# Patient Record
Sex: MALE | Race: WHITE | NOT HISPANIC OR LATINO | Employment: OTHER | URBAN - METROPOLITAN AREA
[De-identification: names, ages, dates, MRNs, and addresses within clinical notes are randomized per-mention and may not be internally consistent; named-entity substitution may affect disease eponyms.]

---

## 2018-02-12 NOTE — H&P
[facility]  TOTAL EYE CARE  [address]  [facility] [address]  [phone]  Dept: [phone]    OPHTHALMOLOGY PRE-OPERATIVE  HISTORY AND PHYSICAL    DATE OF H/P:  2018    DATE OF SURGERY:  2018  PROCEDURE:  Procedure(s):  PHACOEMULSIFICATION CLEAR CORNEA WITH TORIC INTRAOCULAR LENS IMPLANT, Right Eye  LENS IMPLANT:  [id number] +17.0  REFRACTIVE GOAL:  PL Sph  SURGEON:  Mike Collier MD    ANESTHESIA:  TOPICAL / MAC    OR CASE REQUIREMENTS:  Symfony Toric IOL with axis at 169 degrees.    DEMOGRAPHICS:  Demographic Information on Dusty Ruggiero:    Dusty Ruggiero  Gender: male  : 1945  [address]  [place] [address]  [phone] (home)     Medical Record: [id number]  Social Security Number: [id number]  Pharmacy: [facility][id number] [address]  Primary Care Provider: Conrad Gonzalez    Parent's names are: Data Unavailable (mother) and Data Unavailable (father).    Insurance: Payor: BCBS / Plan: BC[employer] PLATINUM BLUE / Product Type: PPO /     OCULAR HISTORY:  Cataracts, astigmatism, presbyopia.    HISTORIES:  Past Medical History:   Diagnosis Date     Generalized osteoarthrosis, unspecified site     Arthritis     Other anxiety states      Prostatitis, unspecified     History of Prostatitis     Ulcerative colitis, unspecified     History of Colitis     Unspecified essential hypertension        Past Surgical History:   Procedure Laterality Date     ARTHROSCOPY SHOULDER, OPEN ROTATOR CUFF REPAIR, COMBINED Right 3/4/2016    Procedure: COMBINED ARTHROSCOPY SHOULDER, OPEN ROTATOR CUFF REPAIR;  Surgeon: Wily Kan MD;  Location: [facility] OR     FLEXIBLE SIGMOIDOSCOPY  1999     SURGICAL HISTORY OF -       Colostomy  with ileostomy     SURGICAL HISTORY OF -       Colostomy Reversal     SURGICAL HISTORY OF -       Left Shoulder Rotator Cuff Surgery       Family History   Problem Relation Age of Onset     Breast Cancer Sister         Social History   Substance Use Topics     Smoking status: Former Smoker     Quit date: 2/5/1994     Smokeless tobacco: Not on file     Alcohol use No      Comment: stopped a couple of months       MEDICATIONS:  No current facility-administered medications for this encounter.      Current Outpatient Prescriptions   Medication Sig     Coenzyme Q10 (COQ-10 PO) Take 1 tablet by mouth daily     COLCHICINE PO Take 0.6 mg by mouth daily     oxyCODONE-acetaminophen (PERCOCET) 5-325 MG per tablet Take 1-2 tablets by mouth every 4 hours as needed for pain (moderate to severe)     hydrOXYzine (ATARAX) 10 MG tablet Take 1 tablet (10 mg) by mouth every 6 hours as needed for itching (and nausea)     DULoxetine HCl (CYMBALTA PO) Take 20 mg by mouth daily     meloxicam (MOBIC) 7.5 MG tablet Take 1 tablet (7.5 mg) by mouth daily     oxyCODONE-acetaminophen (PERCOCET) 5-325 MG per tablet 1-2 tabs by mouth every 4-6 hrs. as needed for pain.     SIMVASTATIN PO Take 20 mg by mouth At Bedtime     RAMIPRIL PO Take 10 mg by mouth daily     LORazepam (ATIVAN PO) Take 1 mg by mouth 3 times daily as needed for anxiety     POTASSIUM CITRATE PO Take 10 mEq by mouth daily     ferrous sulfate 325 (65 FE) MG tablet Take 325 mg by mouth daily     Cholecalciferol (VITAMIN D3 PO) Take 5,000 Units by mouth daily     Omega-3 Fatty Acids (OMEGA-3 FISH OIL PO) Take 1 tablet by mouth daily 900 mg/1400 mg     aspirin 81 MG chewable tablet Take 81 mg by mouth daily     lidocaine (LIDODERM) 5 % patch Place 1 patch onto the skin every 24 hours 12 hours on, 12 hours off     HYDROcodone-acetaminophen (NORCO) 5-325 MG per tablet Take 1 tablet by mouth 3 times daily as needed for moderate to severe pain     OMEPRAZOLE 20 MG OR CPDR 1 CAPSULE by mouth daily as needed       ALLERGIES:     Allergies   Allergen Reactions     Atenolol Shortness Of Breath     Atenolol Difficulty breathing     Rapid heart beat     Buspirone Difficulty breathing     Rapid heart  beat     Gabapentin Other (See Comments)     Dizzy       Lyrica [Pregabalin] Other (See Comments)     Dizzy       Benadryl [Diphenhydramine] Anxiety     Vistaril [Hydroxyzine] Anxiety       PERTINENT SYSTEMS REVIEW:    1. No - Do you have a history of heart attack, stroke, stent, bypass or surgery on an artery in the head, neck, heart or legs?  2. No - Do you ever have any pain or discomfort in your chest?  3. No - Do you have a history of  Heart Failure?  4. No - Are you troubled by shortness of breath when walking: On the level, up a slight hill or at night?  5. No - Do you currently have a cold, bronchitis or other respiratory infection?  6. No - Do you have a cough, shortness of breath or wheezing?  7. No - Do you sometimes get pains in the calves of your legs when you walk?  8. No - Do you or anyone in your family have previous history of blood clots?  9. No - Do you or does anyone in your family have a serious bleeding problem such as prolonged bleeding following surgeries or cuts?  10. No - Have you ever had problems with anemia or been told to take iron pills?  11. No - Have you had any abnormal blood loss such as black, tarry or bloody stools, or abnormal vaginal bleeding?  12. No - Have you ever had a blood transfusion?  13. No - Have you or any of your relatives ever had problems with anesthesia?  14. Yes: mild sleep apnea - Do you have sleep apnea, excessive snoring or daytime drowsiness?  15. No - Do you have any prosthetic heart valves?  16. Yes: Right knee replacement - Do you have prosthetic joints?    EXAMINATION:  Vitals were reviewed  Vison:  Va, right - 20/30, left - 20/30;   BAT, right - 20/70, left - 20/70;  HEENT:  Cataract, otherwise unremarkable.  LUNGS:  Clear  CV:  Regular rate and rhythm without murmur  ABD:  Soft and nontender  NEURO:  Alert and nonfocal    IMPRESSION:  Patient cleared for ophthalmic surgery.  Low risk with monitored, light sedation.  I have assessed the patient's DVT  risk, and no additional orders necessary.    PLAN:  Procedure(s):  PHACOEMULSIFICATION CLEAR CORNEA WITH TORIC INTRAOCULAR LENS IMPLANT, Right Eye      Mike Collier MD

## 2018-02-14 RX ORDER — VIT C/HESPERIDIN/BIOFLAVONOIDS 500-100 MG
TABLET ORAL
COMMUNITY

## 2018-02-14 RX ORDER — GLUCOSAMINE/D3/BOSWELLIA SERRA 1500MG-400
TABLET ORAL
COMMUNITY

## 2018-02-16 ENCOUNTER — ANESTHESIA EVENT (OUTPATIENT)
Dept: SURGERY | Facility: CLINIC | Age: 73
End: 2018-02-16
Payer: MEDICARE

## 2018-02-16 ASSESSMENT — LIFESTYLE VARIABLES: TOBACCO_USE: 1

## 2018-02-16 NOTE — ANESTHESIA PREPROCEDURE EVALUATION
Anesthesia Evaluation     . Pt has had prior anesthetic. Type: General and MAC           ROS/MED HX    ENT/Pulmonary:     (+)tobacco use, Past use , . .    Neurologic:  - neg neurologic ROS     Cardiovascular:     (+) hypertension----. : . . . :. .       METS/Exercise Tolerance:  >4 METS   Hematologic:  - neg hematologic  ROS       Musculoskeletal: Comment: DDD lumbar  (+) arthritis, , , -       GI/Hepatic:     (+) Other GI/Hepatic hx ulcerative colitis      Renal/Genitourinary:     (+) BPH,       Endo:  - neg endo ROS       Psychiatric:     (+) psychiatric history anxiety      Infectious Disease:  - neg infectious disease ROS       Malignancy:      - no malignancy   Other:    - neg other ROS                 Physical Exam  Normal systems: cardiovascular, pulmonary and dental    Airway   Mallampati: II  TM distance: >3 FB  Neck ROM: full    Dental     Cardiovascular       Pulmonary                     Anesthesia Plan      History & Physical Review  History and physical reviewed and following examination; no interval change.    ASA Status:  2 .    NPO Status:  > 6 hours    Plan for MAC Reason for MAC:  Deep or markedly invasive procedure (G8)  PONV prophylaxis:  Ondansetron (or other 5HT-3) and Dexamethasone or Solumedrol       Postoperative Care  Postoperative pain management:  IV analgesics, Oral pain medications and Multi-modal analgesia.      Consents  Anesthetic plan, risks, benefits and alternatives discussed with:  Patient..                          .

## 2018-02-19 ENCOUNTER — HOSPITAL ENCOUNTER (OUTPATIENT)
Facility: CLINIC | Age: 73
Discharge: HOME | End: 2018-02-19
Attending: OPHTHALMOLOGY | Admitting: OPHTHALMOLOGY
Payer: MEDICARE

## 2018-02-19 ENCOUNTER — ANESTHESIA (OUTPATIENT)
Dept: SURGERY | Facility: CLINIC | Age: 73
End: 2018-02-19
Payer: MEDICARE

## 2018-02-19 ENCOUNTER — HOSPITAL ENCOUNTER (OUTPATIENT)
Facility: CLINIC | Age: 73
Discharge: HOME | End: 2018-02-19
Attending: OPHTHALMOLOGY | Admitting: OPHTHALMOLOGY

## 2018-02-19 ENCOUNTER — SURGERY (OUTPATIENT)
Age: 73
End: 2018-02-19

## 2018-02-19 VITALS
RESPIRATION RATE: 16 BRPM | OXYGEN SATURATION: 97 % | SYSTOLIC BLOOD PRESSURE: 130 MMHG | TEMPERATURE: 98.6 F | WEIGHT: 185 LBS | BODY MASS INDEX: 25.06 KG/M2 | HEIGHT: 72 IN | DIASTOLIC BLOOD PRESSURE: 82 MMHG

## 2018-02-19 PROCEDURE — 71000022 ZZH RECOVERY CATRACT PACKAGE: Performed by: OPHTHALMOLOGY

## 2018-02-19 PROCEDURE — 25000128 H RX IP 250 OP 636: Performed by: OPHTHALMOLOGY

## 2018-02-19 PROCEDURE — 37000012 ZZH ANESTHESIA CATARACT PACKAGE: Performed by: OPHTHALMOLOGY

## 2018-02-19 PROCEDURE — 25000128 H RX IP 250 OP 636: Performed by: NURSE ANESTHETIST, CERTIFIED REGISTERED

## 2018-02-19 PROCEDURE — V2788 PRESBYOPIA-CORRECT FUNCTION: HCPCS | Performed by: OPHTHALMOLOGY

## 2018-02-19 PROCEDURE — 25000125 ZZHC RX 250: Performed by: NURSE ANESTHETIST, CERTIFIED REGISTERED

## 2018-02-19 PROCEDURE — 36000025 ZZH CATARACT SURGICAL PACKAGE: Performed by: OPHTHALMOLOGY

## 2018-02-19 PROCEDURE — 25000125 ZZHC RX 250: Performed by: OPHTHALMOLOGY

## 2018-02-19 PROCEDURE — V2632 POST CHMBR INTRAOCULAR LENS: HCPCS | Performed by: OPHTHALMOLOGY

## 2018-02-19 DEVICE — IMPLANTABLE DEVICE: Type: IMPLANTABLE DEVICE | Site: EYE | Status: FUNCTIONAL

## 2018-02-19 RX ORDER — PROPARACAINE HYDROCHLORIDE 5 MG/ML
SOLUTION/ DROPS OPHTHALMIC PRN
Status: DISCONTINUED | OUTPATIENT
Start: 2018-02-19 | End: 2018-02-19 | Stop reason: HOSPADM

## 2018-02-19 RX ORDER — SODIUM CHLORIDE, SODIUM LACTATE, POTASSIUM CHLORIDE, CALCIUM CHLORIDE 600; 310; 30; 20 MG/100ML; MG/100ML; MG/100ML; MG/100ML
INJECTION, SOLUTION INTRAVENOUS CONTINUOUS
Status: DISCONTINUED | OUTPATIENT
Start: 2018-02-19 | End: 2018-02-19 | Stop reason: HOSPADM

## 2018-02-19 RX ORDER — LIDOCAINE 40 MG/G
CREAM TOPICAL
Status: DISCONTINUED | OUTPATIENT
Start: 2018-02-19 | End: 2018-02-19 | Stop reason: HOSPADM

## 2018-02-19 RX ORDER — PHENYLEPHRINE HYDROCHLORIDE 25 MG/ML
1 SOLUTION/ DROPS OPHTHALMIC
Status: COMPLETED | OUTPATIENT
Start: 2018-02-19 | End: 2018-02-19

## 2018-02-19 RX ORDER — BALANCED SALT SOLUTION 6.4; .75; .48; .3; 3.9; 1.7 MG/ML; MG/ML; MG/ML; MG/ML; MG/ML; MG/ML
SOLUTION OPHTHALMIC PRN
Status: DISCONTINUED | OUTPATIENT
Start: 2018-02-19 | End: 2018-02-19 | Stop reason: HOSPADM

## 2018-02-19 RX ORDER — TROPICAMIDE 10 MG/ML
1 SOLUTION/ DROPS OPHTHALMIC
Status: COMPLETED | OUTPATIENT
Start: 2018-02-19 | End: 2018-02-19

## 2018-02-19 RX ORDER — CYCLOPENTOLATE HYDROCHLORIDE 10 MG/ML
1 SOLUTION/ DROPS OPHTHALMIC
Status: COMPLETED | OUTPATIENT
Start: 2018-02-19 | End: 2018-02-19

## 2018-02-19 RX ADMIN — PHENYLEPHRINE HYDROCHLORIDE 1 DROP: 25 SOLUTION/ DROPS OPHTHALMIC at 08:19

## 2018-02-19 RX ADMIN — LIDOCAINE HYDROCHLORIDE 0.5 TUBE: 20 JELLY TOPICAL at 09:37

## 2018-02-19 RX ADMIN — CYCLOPENTOLATE HYDROCHLORIDE 1 DROP: 10 SOLUTION/ DROPS OPHTHALMIC at 08:31

## 2018-02-19 RX ADMIN — BALANCED SALT SOLUTION 250 ML: 6.4; .75; .48; .3; 3.9; 1.7 SOLUTION OPHTHALMIC at 09:36

## 2018-02-19 RX ADMIN — PHENYLEPHRINE HYDROCHLORIDE 1 DROP: 25 SOLUTION/ DROPS OPHTHALMIC at 08:31

## 2018-02-19 RX ADMIN — MIDAZOLAM 2 MG: 1 INJECTION INTRAMUSCULAR; INTRAVENOUS at 09:28

## 2018-02-19 RX ADMIN — Medication 1.4 ML GIVEN: at 09:37

## 2018-02-19 RX ADMIN — PHENYLEPHRINE HYDROCHLORIDE 1 DROP: 25 SOLUTION/ DROPS OPHTHALMIC at 08:25

## 2018-02-19 RX ADMIN — EPINEPHRINE 0.6 ML: 1 INJECTION, SOLUTION INTRAMUSCULAR; SUBCUTANEOUS at 09:40

## 2018-02-19 RX ADMIN — MOXIFLOXACIN HYDROCHLORIDE 0.7 ML: 5 SOLUTION/ DROPS OPHTHALMIC at 09:45

## 2018-02-19 RX ADMIN — TROPICAMIDE 1 DROP: 10 SOLUTION/ DROPS OPHTHALMIC at 08:31

## 2018-02-19 RX ADMIN — LIDOCAINE HYDROCHLORIDE 1 ML: 10 INJECTION, SOLUTION EPIDURAL; INFILTRATION; INTRACAUDAL; PERINEURAL at 08:20

## 2018-02-19 RX ADMIN — PROPARACAINE HYDROCHLORIDE 2 DROP: 5 SOLUTION/ DROPS OPHTHALMIC at 09:37

## 2018-02-19 RX ADMIN — CYCLOPENTOLATE HYDROCHLORIDE 1 DROP: 10 SOLUTION/ DROPS OPHTHALMIC at 08:24

## 2018-02-19 RX ADMIN — TROPICAMIDE 1 DROP: 10 SOLUTION/ DROPS OPHTHALMIC at 08:24

## 2018-02-19 RX ADMIN — CYCLOPENTOLATE HYDROCHLORIDE 1 DROP: 10 SOLUTION/ DROPS OPHTHALMIC at 08:19

## 2018-02-19 RX ADMIN — TROPICAMIDE 1 DROP: 10 SOLUTION/ DROPS OPHTHALMIC at 08:18

## 2018-02-19 NOTE — ANESTHESIA POSTPROCEDURE EVALUATION
Patient: Dusty Ruggiero    Procedure(s):  Right Cataract Removal with Implant--Toric Lens - Wound Class: I-Clean    Diagnosis:cataract  Diagnosis Additional Information: No value filed.    Anesthesia Type:  MAC    Note:  Anesthesia Post Evaluation    Patient location during evaluation: Phase 2 and Bedside  Patient participation: Able to fully participate in evaluation  Level of consciousness: awake and alert  Pain management: adequate  Airway patency: patent  Cardiovascular status: acceptable and hemodynamically stable  Respiratory status: acceptable and room air  Hydration status: acceptable  PONV: none     Anesthetic complications: None          Last vitals:  Vitals:    02/19/18 0752 02/19/18 0952   BP: (!) 143/93 132/76   Resp: 16 16   Temp: 37  C (98.6  F)    SpO2: 94% 96%         Electronically Signed By: AURORA Corbin CRNA  February 19, 2018  9:55 AM

## 2018-02-19 NOTE — OP NOTE
OPHTHALMOLOGY OPERATIVE NOTE    PATIENT: Dusty Ruggiero  DATE OF SURGERY: 2/19/2018  PREOPERATIVE DIAGNOSIS:  Senile Nuclear Cataract, Right eye  POSTOPERATIVE DIAGNOSIS:  Senile Nuclear Cataract, Right eye  OPERATIVE PROCEDURE:  Phacoemulsification with placement of advanced toric multifocal intraocular lens  SURGEON:  Mike Collier MD  ANESTHESIA:  Topical / MAC  EBL:  None  SPECIMENS:  None  COMPLICATIONS:  None    PROCEDURE:  The patient was brought to the operating room at [facility].  The eye was anesthetized with topical anesthetic, and a sterile marking pen was used to roxana the limbus at the 0 and 180 axis.  The right eye was prepped and draped in the usual fashion for cataract surgery.  A wire lid speculum was inserted.  A super sharp blade was used to make a paracentesis at the 11 O'clock position.  The super sharp blade was used to make a partial thickness temporal groove, which was 3 mm in length.  0.8 mL of non-preserved epi-Shugarcaine was injected into the anterior chamber.  Viscoelastic was used to inflate the anterior chamber through a cannula.  A 2.5 mm microkeratome was used to make a temporal clear corneal incision in a two-plane fashion.  A cystotome needle and forceps were used to make a capsulorrhexis.  Hydrodissection and hydrodelineation were performed with Balance Salt Solution.  The lens was then phacoemulsified and removed without complications.  The cortical material was removed with bimanual irrigation and aspiration.  The capsular bag was filled with viscoelastic.  A posterior chamber intraocular lens, preselected and recorded, was folded and inserted into the capsular bag.  The lens was then rotated to the correct axis according to the preoperative data, using the limbal markings and an axial gauge.  The viscoelastic was removed with the irrigation and aspiration tip.  Balanced Salt Solution with Vigamox, 150mg/0.1mL, was used to refill the anterior  chamber, and make final lens axis adjustments.  The wounds were checked for water tightness and required no suture.  The wire lid speculum was removed.  The patient's right eye was cleaned and a drop of each post-operative drop was placed, followed by a han shield.  The patient tolerated the procedure well, and there were no complications.      Mike Collier MD

## 2018-02-19 NOTE — ANESTHESIA CARE TRANSFER NOTE
Patient: Dusty Ruggiero    Procedure(s):  Right Cataract Removal with Implant--Toric Lens - Wound Class: I-Clean    Diagnosis: cataract  Diagnosis Additional Information: No value filed.    Anesthesia Type:   MAC     Note:  Airway :Room Air  Patient transferred to:Phase II  Handoff Report: Identifed the Patient, Identified the Reponsible Provider, Reviewed the pertinent medical history, Discussed the surgical course, Reviewed Intra-OP anesthesia mangement and issues during anesthesia, Set expectations for post-procedure period and Allowed opportunity for questions and acknowledgement of understanding      Vitals: (Last set prior to Anesthesia Care Transfer)    CRNA VITALS  2/19/2018 0918 - 2/19/2018 0949      2/19/2018             Pulse: 57    SpO2: 95 %                Electronically Signed By: AURORA Corbin CRNA  February 19, 2018  9:49 AM

## 2018-03-05 ENCOUNTER — ANESTHESIA EVENT (OUTPATIENT)
Dept: SURGERY | Facility: CLINIC | Age: 73
End: 2018-03-05
Payer: MEDICARE

## 2018-03-05 ASSESSMENT — LIFESTYLE VARIABLES: TOBACCO_USE: 1

## 2018-03-05 NOTE — ANESTHESIA PREPROCEDURE EVALUATION
Anesthesia Evaluation     . Pt has had prior anesthetic. Type: General and MAC           ROS/MED HX    ENT/Pulmonary:     (+)tobacco use, Past use , . .    Neurologic:  - neg neurologic ROS     Cardiovascular:     (+) hypertension----. : . . . :. .       METS/Exercise Tolerance:  >4 METS   Hematologic:  - neg hematologic  ROS       Musculoskeletal: Comment: DDD lumbar  (+) arthritis, , , other musculoskeletal- DDD      GI/Hepatic:     (+) Other GI/Hepatic hx ulcerative colitis      Renal/Genitourinary:     (+) BPH,       Endo:  - neg endo ROS       Psychiatric:     (+) psychiatric history anxiety      Infectious Disease:  - neg infectious disease ROS       Malignancy:      - no malignancy   Other: Comment: Left cataract                    Physical Exam  Normal systems: cardiovascular, pulmonary and dental    Airway   Mallampati: II  TM distance: >3 FB  Neck ROM: full    Dental     Cardiovascular       Pulmonary                         Anesthesia Plan      History & Physical Review  History and physical reviewed and following examination; no interval change.    ASA Status:  2 .    NPO Status:  > 6 hours    Plan for MAC Reason for MAC:  Deep or markedly invasive procedure (G8)  PONV prophylaxis:  Ondansetron (or other 5HT-3) and Dexamethasone or Solumedrol       Postoperative Care  Postoperative pain management:  IV analgesics, Oral pain medications and Multi-modal analgesia.      Consents  Anesthetic plan, risks, benefits and alternatives discussed with:  Patient..                          .

## 2018-03-07 NOTE — H&P
[facility]  TOTAL EYE CARE  [address]  [facility] [address]  [phone]  Dept: [phone]    OPHTHALMOLOGY PRE-OPERATIVE  HISTORY AND PHYSICAL    DATE OF H/P:  3/7/2018    DATE OF SURGERY:  2018  PROCEDURE:  Procedure(s):  PHACOEMULSIFICATION CLEAR CORNEA WITH TORIC MULTIFOCAL INTRAOCULAR LENS IMPLANT, Left Eye  LENS IMPLANT:  [id number] +17.0  REFRACTIVE GOAL:  PL Sph  SURGEON:  Mike Collier MD    ANESTHESIA:  TOPICAL / MAC    OR CASE REQUIREMENTS:  Toric Multifocal IOL with axis at 2 degrees.    DEMOGRAPHICS:  Demographic Information on Dusty Ruggiero:    Dusty Ruggiero  Gender: male  : 1945  [address]  [place] [address]  [phone] (home)     Medical Record: [id number]  Social Security Number: [id number]  Pharmacy: [facility] #[id number] [address]  Primary Care Provider: Conrad Gonzalez    Parent's names are: Data Unavailable (mother) and Data Unavailable (father).    Insurance: Payor: [employer] / Plan: [employer] PLATINUM BLUE / Product Type: PPO /     OCULAR HISTORY:  Cataracts, s/p IOL OD, astigmatism, presbyopia.    HISTORIES:  Past Medical History:   Diagnosis Date     Generalized osteoarthrosis, unspecified site     Arthritis     Other anxiety states      Prostatitis, unspecified     History of Prostatitis     Ulcerative colitis, unspecified     History of Colitis     Unspecified essential hypertension        Past Surgical History:   Procedure Laterality Date     ARTHROSCOPY SHOULDER, OPEN ROTATOR CUFF REPAIR, COMBINED Right 3/4/2016    Procedure: COMBINED ARTHROSCOPY SHOULDER, OPEN ROTATOR CUFF REPAIR;  Surgeon: Wily Kan MD;  Location: [facility] OR     FLEXIBLE SIGMOIDOSCOPY  1999     PHACOEMULSIFICATION CLEAR CORNEA WITH TORIC INTRAOCULAR LENS IMPLANT Right 2018    Procedure: PHACOEMULSIFICATION CLEAR CORNEA WITH TORIC INTRAOCULAR LENS IMPLANT;  Right Cataract Removal with Implant--Toric Lens;  Surgeon: Nando  Mike Lee MD;  Location: [facility] OR     SURGICAL HISTORY OF -       Colostomy  with ileostomy     SURGICAL HISTORY OF -   2000    Colostomy Reversal     SURGICAL HISTORY OF -       Left Shoulder Rotator Cuff Surgery       Family History   Problem Relation Age of Onset     Breast Cancer Sister        Social History   Substance Use Topics     Smoking status: Former Smoker     Quit date: 2/5/1994     Smokeless tobacco: Not on file     Alcohol use No      Comment: stopped a couple of months       MEDICATIONS:  No current facility-administered medications for this encounter.      Current Outpatient Prescriptions   Medication Sig     Zinc 30 MG TABS      Biotin 47308 MCG TABS      MORPHINE SULFATE PO Take 15 mg by mouth Twice daily     SERTRALINE HCL PO Take 50 mg by mouth 2 times daily     PRAVASTATIN SODIUM PO Take 40 mg by mouth every evening     Naproxen Sodium (ALEVE PO) Take 220 mg by mouth Takes 3 times daily     Coenzyme Q10 (COQ-10 PO) Take 1 tablet by mouth daily     hydrOXYzine (ATARAX) 10 MG tablet Take 1 tablet (10 mg) by mouth every 6 hours as needed for itching (and nausea)     RAMIPRIL PO Take 10 mg by mouth daily     LORazepam (ATIVAN PO) Take 1 mg by mouth 3 times daily as needed for anxiety     ferrous sulfate 325 (65 FE) MG tablet Take 325 mg by mouth daily     Cholecalciferol (VITAMIN D3 PO) Take 5,000 Units by mouth daily     OMEPRAZOLE 20 MG OR CPDR 1 CAPSULE by mouth daily as needed       ALLERGIES:     Allergies   Allergen Reactions     Atenolol Shortness Of Breath     Atenolol Difficulty breathing     Rapid heart beat     Buspirone Difficulty breathing     Rapid heart beat     Gabapentin Other (See Comments)     Dizzy       Lyrica [Pregabalin] Other (See Comments)     Dizzy       Benadryl [Diphenhydramine] Anxiety     Vistaril [Hydroxyzine] Anxiety       PERTINENT SYSTEMS REVIEW:    1. No - Do you have a history of heart attack, stroke, stent, bypass or surgery on an artery in the head, neck,  heart or legs?  2. No - Do you ever have any pain or discomfort in your chest?  3. No - Do you have a history of  Heart Failure?  4. No - Are you troubled by shortness of breath when walking: On the level, up a slight hill or at night?  5. No - Do you currently have a cold, bronchitis or other respiratory infection?  6. No - Do you have a cough, shortness of breath or wheezing?  7. No - Do you sometimes get pains in the calves of your legs when you walk?  8. No - Do you or anyone in your family have previous history of blood clots?  9. No - Do you or does anyone in your family have a serious bleeding problem such as prolonged bleeding following surgeries or cuts?  10. No - Have you ever had problems with anemia or been told to take iron pills?  11. No - Have you had any abnormal blood loss such as black, tarry or bloody stools, or abnormal vaginal bleeding?  12. No - Have you ever had a blood transfusion?  13. No - Have you or any of your relatives ever had problems with anesthesia?  14. No - Do you have sleep apnea, excessive snoring or daytime drowsiness?  15. No - Do you have any prosthetic heart valves?  16. Yes: knee replacement - Do you have prosthetic joints?    EXAMINATION:  Vitals were reviewed                     Vison:  Va, left - 20/30;   BAT, left - 20/70;  HEENT:  Cataract, otherwise unremarkable.  LUNGS:  Clear  CV:  Regular rate and rhythm without murmur  ABD:  Soft and nontender  NEURO:  Alert and nonfocal    IMPRESSION:  Patient cleared for ophthalmic surgery.  Low risk with monitored, light sedation.  I have assessed the patient's DVT risk, and no additional orders necessary.    PLAN:  Procedure(s):  PHACOEMULSIFICATION CLEAR CORNEA WITH TORIC MULTIFOCAL INTRAOCULAR LENS IMPLANT, Left Eye      Mike Collier MD

## 2018-03-12 ENCOUNTER — HOSPITAL ENCOUNTER (OUTPATIENT)
Facility: CLINIC | Age: 73
Discharge: HOME | End: 2018-03-12
Attending: OPHTHALMOLOGY | Admitting: OPHTHALMOLOGY

## 2018-03-12 ENCOUNTER — SURGERY (OUTPATIENT)
Age: 73
End: 2018-03-12

## 2018-03-12 ENCOUNTER — HOSPITAL ENCOUNTER (OUTPATIENT)
Facility: CLINIC | Age: 73
Discharge: HOME | End: 2018-03-12
Attending: OPHTHALMOLOGY | Admitting: OPHTHALMOLOGY
Payer: MEDICARE

## 2018-03-12 ENCOUNTER — ANESTHESIA (OUTPATIENT)
Dept: SURGERY | Facility: CLINIC | Age: 73
End: 2018-03-12
Payer: MEDICARE

## 2018-03-12 VITALS
BODY MASS INDEX: 25.06 KG/M2 | DIASTOLIC BLOOD PRESSURE: 82 MMHG | SYSTOLIC BLOOD PRESSURE: 126 MMHG | RESPIRATION RATE: 14 BRPM | WEIGHT: 185 LBS | TEMPERATURE: 98.9 F | OXYGEN SATURATION: 95 % | HEIGHT: 72 IN

## 2018-03-12 PROCEDURE — 25000125 ZZHC RX 250: Performed by: NURSE ANESTHETIST, CERTIFIED REGISTERED

## 2018-03-12 PROCEDURE — 37000012 ZZH ANESTHESIA CATARACT PACKAGE: Performed by: OPHTHALMOLOGY

## 2018-03-12 PROCEDURE — 25000125 ZZHC RX 250: Performed by: OPHTHALMOLOGY

## 2018-03-12 PROCEDURE — 71000022 ZZH RECOVERY CATRACT PACKAGE: Performed by: OPHTHALMOLOGY

## 2018-03-12 PROCEDURE — 36000025 ZZH CATARACT SURGICAL PACKAGE: Performed by: OPHTHALMOLOGY

## 2018-03-12 PROCEDURE — 25000128 H RX IP 250 OP 636: Performed by: OPHTHALMOLOGY

## 2018-03-12 PROCEDURE — V2632 POST CHMBR INTRAOCULAR LENS: HCPCS | Performed by: OPHTHALMOLOGY

## 2018-03-12 PROCEDURE — 25000128 H RX IP 250 OP 636: Performed by: NURSE ANESTHETIST, CERTIFIED REGISTERED

## 2018-03-12 PROCEDURE — V2788 PRESBYOPIA-CORRECT FUNCTION: HCPCS | Performed by: OPHTHALMOLOGY

## 2018-03-12 RX ORDER — PHENYLEPHRINE HYDROCHLORIDE 25 MG/ML
1 SOLUTION/ DROPS OPHTHALMIC
Status: COMPLETED | OUTPATIENT
Start: 2018-03-12 | End: 2018-03-12

## 2018-03-12 RX ORDER — MEPERIDINE HYDROCHLORIDE 25 MG/ML
12.5 INJECTION INTRAMUSCULAR; INTRAVENOUS; SUBCUTANEOUS
Status: DISCONTINUED | OUTPATIENT
Start: 2018-03-12 | End: 2018-03-12 | Stop reason: HOSPADM

## 2018-03-12 RX ORDER — FENTANYL CITRATE 50 UG/ML
25-50 INJECTION, SOLUTION INTRAMUSCULAR; INTRAVENOUS
Status: CANCELLED | OUTPATIENT
Start: 2018-03-12

## 2018-03-12 RX ORDER — SODIUM CHLORIDE, SODIUM LACTATE, POTASSIUM CHLORIDE, CALCIUM CHLORIDE 600; 310; 30; 20 MG/100ML; MG/100ML; MG/100ML; MG/100ML
INJECTION, SOLUTION INTRAVENOUS CONTINUOUS
Status: DISCONTINUED | OUTPATIENT
Start: 2018-03-12 | End: 2018-03-12 | Stop reason: HOSPADM

## 2018-03-12 RX ORDER — LIDOCAINE 40 MG/G
CREAM TOPICAL
Status: DISCONTINUED | OUTPATIENT
Start: 2018-03-12 | End: 2018-03-12 | Stop reason: HOSPADM

## 2018-03-12 RX ORDER — CYCLOPENTOLATE HYDROCHLORIDE 10 MG/ML
1 SOLUTION/ DROPS OPHTHALMIC
Status: COMPLETED | OUTPATIENT
Start: 2018-03-12 | End: 2018-03-12

## 2018-03-12 RX ORDER — PROPARACAINE HYDROCHLORIDE 5 MG/ML
SOLUTION/ DROPS OPHTHALMIC PRN
Status: DISCONTINUED | OUTPATIENT
Start: 2018-03-12 | End: 2018-03-12 | Stop reason: HOSPADM

## 2018-03-12 RX ORDER — TROPICAMIDE 10 MG/ML
1 SOLUTION/ DROPS OPHTHALMIC
Status: COMPLETED | OUTPATIENT
Start: 2018-03-12 | End: 2018-03-12

## 2018-03-12 RX ORDER — NALOXONE HYDROCHLORIDE 0.4 MG/ML
.1-.4 INJECTION, SOLUTION INTRAMUSCULAR; INTRAVENOUS; SUBCUTANEOUS
Status: DISCONTINUED | OUTPATIENT
Start: 2018-03-12 | End: 2018-03-12 | Stop reason: HOSPADM

## 2018-03-12 RX ORDER — ALBUTEROL SULFATE 0.83 MG/ML
2.5 SOLUTION RESPIRATORY (INHALATION) EVERY 4 HOURS PRN
Status: CANCELLED | OUTPATIENT
Start: 2018-03-12

## 2018-03-12 RX ORDER — FENTANYL CITRATE 50 UG/ML
25-50 INJECTION, SOLUTION INTRAMUSCULAR; INTRAVENOUS
Status: DISCONTINUED | OUTPATIENT
Start: 2018-03-12 | End: 2018-03-12 | Stop reason: HOSPADM

## 2018-03-12 RX ORDER — ONDANSETRON 2 MG/ML
4 INJECTION INTRAMUSCULAR; INTRAVENOUS EVERY 30 MIN PRN
Status: DISCONTINUED | OUTPATIENT
Start: 2018-03-12 | End: 2018-03-12 | Stop reason: HOSPADM

## 2018-03-12 RX ORDER — ONDANSETRON 4 MG/1
4 TABLET, ORALLY DISINTEGRATING ORAL EVERY 30 MIN PRN
Status: DISCONTINUED | OUTPATIENT
Start: 2018-03-12 | End: 2018-03-12 | Stop reason: HOSPADM

## 2018-03-12 RX ORDER — HYDROMORPHONE HYDROCHLORIDE 1 MG/ML
.3-.5 INJECTION, SOLUTION INTRAMUSCULAR; INTRAVENOUS; SUBCUTANEOUS EVERY 10 MIN PRN
Status: DISCONTINUED | OUTPATIENT
Start: 2018-03-12 | End: 2018-03-12 | Stop reason: HOSPADM

## 2018-03-12 RX ADMIN — TROPICAMIDE 1 DROP: 10 SOLUTION/ DROPS OPHTHALMIC at 06:33

## 2018-03-12 RX ADMIN — PHENYLEPHRINE HYDROCHLORIDE 1 DROP: 25 SOLUTION/ DROPS OPHTHALMIC at 06:32

## 2018-03-12 RX ADMIN — PHENYLEPHRINE HYDROCHLORIDE 1 DROP: 25 SOLUTION/ DROPS OPHTHALMIC at 06:23

## 2018-03-12 RX ADMIN — LIDOCAINE HYDROCHLORIDE 1 ML: 10 INJECTION, SOLUTION EPIDURAL; INFILTRATION; INTRACAUDAL; PERINEURAL at 06:39

## 2018-03-12 RX ADMIN — CYCLOPENTOLATE HYDROCHLORIDE 1 DROP: 10 SOLUTION/ DROPS OPHTHALMIC at 06:32

## 2018-03-12 RX ADMIN — EPINEPHRINE 0.6 ML: 1 INJECTION, SOLUTION INTRAMUSCULAR; SUBCUTANEOUS at 07:36

## 2018-03-12 RX ADMIN — TROPICAMIDE 1 DROP: 10 SOLUTION/ DROPS OPHTHALMIC at 06:18

## 2018-03-12 RX ADMIN — CYCLOPENTOLATE HYDROCHLORIDE 1 DROP: 10 SOLUTION/ DROPS OPHTHALMIC at 06:15

## 2018-03-12 RX ADMIN — CYCLOPENTOLATE HYDROCHLORIDE 1 DROP: 10 SOLUTION/ DROPS OPHTHALMIC at 06:23

## 2018-03-12 RX ADMIN — PROPARACAINE HYDROCHLORIDE 2 DROP: 5 SOLUTION/ DROPS OPHTHALMIC at 07:36

## 2018-03-12 RX ADMIN — MIDAZOLAM 2 MG: 1 INJECTION INTRAMUSCULAR; INTRAVENOUS at 07:25

## 2018-03-12 RX ADMIN — MOXIFLOXACIN HYDROCHLORIDE 0.4 ML: 5 SOLUTION/ DROPS OPHTHALMIC at 07:37

## 2018-03-12 RX ADMIN — TROPICAMIDE 1 DROP: 10 SOLUTION/ DROPS OPHTHALMIC at 06:24

## 2018-03-12 RX ADMIN — PHENYLEPHRINE HYDROCHLORIDE 1 DROP: 25 SOLUTION/ DROPS OPHTHALMIC at 06:17

## 2018-03-12 RX ADMIN — LIDOCAINE HYDROCHLORIDE 1 TUBE: 20 JELLY TOPICAL at 07:37

## 2018-03-12 NOTE — OP NOTE
OPHTHALMOLOGY OPERATIVE NOTE    PATIENT: Dusty Ruggiero  DATE OF SURGERY: 3/12/2018  PREOPERATIVE DIAGNOSIS:  Senile Nuclear Cataract, Left eye  POSTOPERATIVE DIAGNOSIS:  Senile Nuclear Cataract, Left eye  OPERATIVE PROCEDURE:  Phacoemulsification with placement of advanced toric multifocal intraocular lens  SURGEON:  Mike Collier MD  ANESTHESIA:  Topical / MAC  EBL:  None  SPECIMENS:  None  COMPLICATIONS:  None    PROCEDURE:  The patient was brought to the operating room at [facility].  The eye was anesthetized with topical anesthetic, and a sterile marking pen was used to roxana the limbus at the 0 and 180 axis.  The left eye was prepped and draped in the usual fashion for cataract surgery.  A wire lid speculum was inserted.  A super sharp blade was used to make a paracentesis at the 5 O'clock position.  The super sharp blade was used to make a partial thickness temporal groove, which was 3 mm in length.  0.8 mL of non-preserved epi-Shugarcaine was injected into the anterior chamber.  Viscoelastic was used to inflate the anterior chamber through a cannula.  A 2.5 mm microkeratome was used to make a temporal clear corneal incision in a two-plane fashion.  A cystotome needle and forceps were used to make a capsulorrhexis.  Hydrodissection and hydrodelineation were performed with Balance Salt Solution.  The lens was then phacoemulsified and removed without complications.  The cortical material was removed with bimanual irrigation and aspiration.  The capsular bag was filled with viscoelastic.  A posterior chamber intraocular lens, preselected and recorded, was folded and inserted into the capsular bag.  The lens was then rotated to the correct axis according to the preoperative data, using the limbal markings and an axial gauge.  The viscoelastic was removed with the irrigation and aspiration tip.  Balanced Salt Solution with Vigamox, 150mg/0.1mL,was used to refill the anterior  chamber, and make final lens axis adjustments.  The wounds were checked for water tightness and required no suture.  The wire lid speculum was removed.  The patient's left eye was cleaned and a drop of each post-operative drop was placed, followed by a han shield.  The patient tolerated the procedure well, and there were no complications.      Mike Collier MD

## 2018-03-12 NOTE — ANESTHESIA CARE TRANSFER NOTE
Patient: Dusty Ruggiero    Procedure(s):  Left Cataract Removal with Implant--Toric lens - Wound Class: I-Clean    Diagnosis: cataract  Diagnosis Additional Information: No value filed.    Anesthesia Type:   MAC     Note:    Patient transferred to:Phase II  Handoff Report: Identifed the Patient, Identified the Reponsible Provider, Reviewed the pertinent medical history, Discussed the surgical course, Reviewed Intra-OP anesthesia mangement and issues during anesthesia, Set expectations for post-procedure period and Allowed opportunity for questions and acknowledgement of understanding      Vitals: (Last set prior to Anesthesia Care Transfer)    CRNA VITALS  3/12/2018 0715 - 3/12/2018 0746      3/12/2018             Pulse: 65    SpO2: 95 %                Electronically Signed By: Solange Yan CRNA, APRN CRNA  March 12, 2018  7:46 AM

## 2018-03-12 NOTE — ANESTHESIA POSTPROCEDURE EVALUATION
Patient: Dusty Ruggiero    Procedure(s):  Left Cataract Removal with Implant--Toric lens - Wound Class: I-Clean    Diagnosis:cataract  Diagnosis Additional Information: No value filed.    Anesthesia Type:  MAC    Note:  Anesthesia Post Evaluation    Patient location during evaluation: Bedside  Patient participation: Able to fully participate in evaluation  Level of consciousness: awake and alert  Pain management: adequate  Airway patency: patent  Cardiovascular status: acceptable  Respiratory status: acceptable  Hydration status: acceptable  PONV: none     Anesthetic complications: None          Last vitals:  Vitals:    03/12/18 0600   BP: (!) 171/96   Resp: 16   Temp: 37.2  C (98.9  F)   SpO2: 95%         Electronically Signed By: Solange Yan CRNA, APRN CRNA  March 12, 2018  7:46 AM

## 2018-11-14 ENCOUNTER — OFFICE VISIT (OUTPATIENT)
Dept: DERMATOLOGY | Facility: CLINIC | Age: 73
End: 2018-11-14
Payer: COMMERCIAL

## 2018-11-14 VITALS — DIASTOLIC BLOOD PRESSURE: 73 MMHG | HEART RATE: 77 BPM | SYSTOLIC BLOOD PRESSURE: 114 MMHG | OXYGEN SATURATION: 94 %

## 2018-11-14 DIAGNOSIS — L82.1 SK (SEBORRHEIC KERATOSIS): Primary | ICD-10-CM

## 2018-11-14 DIAGNOSIS — D18.00 ANGIOMA: ICD-10-CM

## 2018-11-14 DIAGNOSIS — L81.4 LENTIGO: ICD-10-CM

## 2018-11-14 PROCEDURE — 99203 OFFICE O/P NEW LOW 30 MIN: CPT | Performed by: DERMATOLOGY

## 2018-11-14 NOTE — PROGRESS NOTES
Dusty Ruggiero is a 73 year old year old male patient here today for spot on nose.   .  Patient states this has been present for a while.  Patient reports the following symptoms:  none .  Patient reports the following previous treatments none.  Patient reports the following modifying factors none.  Associated symptoms: none.  Patient has no other skin complaints today.  Remainder of the HPI, Meds, PMH, Allergies, FH, and SH was reviewed in chart.      Past Medical History:   Diagnosis Date     Generalized osteoarthrosis, unspecified site     Arthritis     Other anxiety states      Prostatitis, unspecified     History of Prostatitis     Ulcerative colitis, unspecified     History of Colitis     Unspecified essential hypertension        Past Surgical History:   Procedure Laterality Date     ARTHROSCOPY SHOULDER, OPEN ROTATOR CUFF REPAIR, COMBINED Right 3/4/2016    Procedure: COMBINED ARTHROSCOPY SHOULDER, OPEN ROTATOR CUFF REPAIR;  Surgeon: Wily Kan MD;  Location: [facility]     FLEXIBLE SIGMOIDOSCOPY  9/1999     PHACOEMULSIFICATION CLEAR CORNEA WITH TORIC INTRAOCULAR LENS IMPLANT Right 2/19/2018    Procedure: PHACOEMULSIFICATION CLEAR CORNEA WITH TORIC INTRAOCULAR LENS IMPLANT;  Right Cataract Removal with Implant--Toric Lens;  Surgeon: Mike Collier MD;  Location: [facility]     PHACOEMULSIFICATION CLEAR CORNEA WITH TORIC INTRAOCULAR LENS IMPLANT Left 3/12/2018    Procedure: PHACOEMULSIFICATION CLEAR CORNEA WITH TORIC INTRAOCULAR LENS IMPLANT;  Left Cataract Removal with Implant--Toric lens;  Surgeon: Mike Collier MD;  Location: [facility]     SURGICAL HISTORY OF -       Colostomy  with ileostomy     SURGICAL HISTORY OF -   2000    Colostomy Reversal     SURGICAL HISTORY OF -       Left Shoulder Rotator Cuff Surgery        Family History   Problem Relation Age of Onset     Skin Cancer Father      Breast Cancer Sister      Melanoma No family hx of        Social History     Social History      Marital status:      Spouse name: N/A     Number of children: N/A     Years of education: N/A     Occupational History     Not on file.     Social History Main Topics     Smoking status: Former Smoker     Packs/day: 0.25     Years: 20.00     Types: Cigarettes     Quit date: 2/5/1995     Smokeless tobacco: Never Used     Alcohol use No      Comment: stopped a couple of months     Drug use: No     Sexual activity: Not on file     Other Topics Concern     Parent/Sibling W/ Cabg, Mi Or Angioplasty Before 65f 55m? No     Social History Narrative       Outpatient Encounter Prescriptions as of 11/14/2018   Medication Sig Dispense Refill     Biotin 69448 MCG TABS        Cholecalciferol (VITAMIN D3 PO) Take 5,000 Units by mouth daily       Coenzyme Q10 (COQ-10 PO) Take 1 tablet by mouth daily       ferrous sulfate 325 (65 FE) MG tablet Take 325 mg by mouth daily       MORPHINE SULFATE PO Take 15 mg by mouth Twice daily       OMEPRAZOLE 20 MG OR CPDR 1 CAPSULE by mouth daily as needed       PRAVASTATIN SODIUM PO Take 40 mg by mouth every evening       RAMIPRIL PO Take 10 mg by mouth daily       SERTRALINE HCL PO Take 50 mg by mouth 2 times daily       Zinc 30 MG TABS        LORazepam (ATIVAN PO) Take 1 mg by mouth 3 times daily as needed for anxiety       Naproxen Sodium (ALEVE PO) Take 220 mg by mouth Takes 3 times daily       No facility-administered encounter medications on file as of 11/14/2018.              Review Of Systems  Skin: As above  Eyes: negative  Ears/Nose/Throat: negative  Respiratory: No shortness of breath, dyspnea on exertion, cough, or hemoptysis  Cardiovascular: negative  Gastrointestinal: negative  Genitourinary: negative  Musculoskeletal: negative  Neurologic: negative  Psychiatric: negative  Hematologic/Lymphatic/Immunologic: negative  Endocrine: negative      O:   NAD, WDWN, Alert & Oriented, Mood & Affect wnl, Vitals stable   Here today alone   /73  Pulse 77  SpO2 94%   General  appearance normal   Vitals stable   Alert, oriented and in no acute distress        Stuck on papules and brown macules on trunk and ext   Red papules on trunk     The remainder of expanded problem focused exam was unremarkable; the following areas were examined:  scalp/hair, conjunctiva/lids, face, neck, lips, chest, digits/nails, RUE, LUE.      Eyes: Conjunctivae/lids:Normal     ENT: Lips, buccal mucosa, tongue: normal    MSK:Normal    Cardiovascular: peripheral edema none    Pulm: Breathing Normal    Lymph Nodes: No Head and Neck Lymphadenopathy     Neuro/Psych: Orientation:Normal; Mood/Affect:Normal      A/P:  1. Seborrheic keratosis, lentigo, angioma,   BENIGN LESIONS DISCUSSED WITH PATIENT:  I discussed the specifics of tumor, prognosis, and genetics of benign lesions.  I explained that treatment of these lesions would be purely cosmetic and not medically neccessary.  I discussed with patient different removal options including excision, cautery and /or laser.      Nature and genetics of benign skin lesions dicussed with patient.  Signs and Symptoms of skin cancer discussed with patient.  ABCDEs of melanoma reviewed with patient.  Patient encouraged to perform monthly skin exams.  UV precautions reviewed with patient.  Patient to follow up with Primary Care provider regarding elevated blood pressure.  Skin care regimen reviewed with patient: Eliminate harsh soaps, i.e. Dial, zest, irsih spring; Mild soaps such as Cetaphil or Dove sensitive skin, avoid hot or cold showers, aggressive use of emollients including vanicream, cetaphil or cerave discussed with patient.    Risks of non-melanoma skin cancer discussed with patient   Return to clinic 12 months  Patient to follow up with Primary Care provider regarding elevated blood pressure.

## 2018-11-14 NOTE — LETTER
11/14/2018         RE: Dusty Ruggiero  [address]  [place] [address]        Dear Colleague,    Thank you for referring your patient, Dusty Ruggiero, to the [facility]. Please see a copy of my visit note below.    Dusty Ruggiero is a 73 year old year old male patient here today for spot on nose.   .  Patient states this has been present for a while.  Patient reports the following symptoms:  none .  Patient reports the following previous treatments none.  Patient reports the following modifying factors none.  Associated symptoms: none.  Patient has no other skin complaints today.  Remainder of the HPI, Meds, PMH, Allergies, FH, and SH was reviewed in chart.      Past Medical History:   Diagnosis Date     Generalized osteoarthrosis, unspecified site     Arthritis     Other anxiety states      Prostatitis, unspecified     History of Prostatitis     Ulcerative colitis, unspecified     History of Colitis     Unspecified essential hypertension        Past Surgical History:   Procedure Laterality Date     ARTHROSCOPY SHOULDER, OPEN ROTATOR CUFF REPAIR, COMBINED Right 3/4/2016    Procedure: COMBINED ARTHROSCOPY SHOULDER, OPEN ROTATOR CUFF REPAIR;  Surgeon: Wily Kan MD;  Location: [facility]     FLEXIBLE SIGMOIDOSCOPY  9/1999     PHACOEMULSIFICATION CLEAR CORNEA WITH TORIC INTRAOCULAR LENS IMPLANT Right 2/19/2018    Procedure: PHACOEMULSIFICATION CLEAR CORNEA WITH TORIC INTRAOCULAR LENS IMPLANT;  Right Cataract Removal with Implant--Toric Lens;  Surgeon: Mike Collier MD;  [facility]     PHACOEMULSIFICATION CLEAR CORNEA WITH TORIC INTRAOCULAR LENS IMPLANT Left 3/12/2018    Procedure: PHACOEMULSIFICATION CLEAR CORNEA WITH TORIC INTRAOCULAR LENS IMPLANT;  Left Cataract Removal with Implant--Toric lens;  Surgeon: Mike Collier MD;  [facility]     SURGICAL HISTORY OF -       Colostomy  with ileostomy     SURGICAL HISTORY OF -   2000    Colostomy Reversal     SURGICAL  HISTORY OF -       Left Shoulder Rotator Cuff Surgery        Family History   Problem Relation Age of Onset     Skin Cancer Father      Breast Cancer Sister      Melanoma No family hx of        Social History     Social History     Marital status:      Spouse name: N/A     Number of children: N/A     Years of education: N/A     Occupational History     Not on file.     Social History Main Topics     Smoking status: Former Smoker     Packs/day: 0.25     Years: 20.00     Types: Cigarettes     Quit date: 2/5/1995     Smokeless tobacco: Never Used     Alcohol use No      Comment: stopped a couple of months     Drug use: No     Sexual activity: Not on file     Other Topics Concern     Parent/Sibling W/ Cabg, Mi Or Angioplasty Before 65f 55m? No     Social History Narrative       Outpatient Encounter Prescriptions as of 11/14/2018   Medication Sig Dispense Refill     Biotin 57265 MCG TABS        Cholecalciferol (VITAMIN D3 PO) Take 5,000 Units by mouth daily       Coenzyme Q10 (COQ-10 PO) Take 1 tablet by mouth daily       ferrous sulfate 325 (65 FE) MG tablet Take 325 mg by mouth daily       MORPHINE SULFATE PO Take 15 mg by mouth Twice daily       OMEPRAZOLE 20 MG OR CPDR 1 CAPSULE by mouth daily as needed       PRAVASTATIN SODIUM PO Take 40 mg by mouth every evening       RAMIPRIL PO Take 10 mg by mouth daily       SERTRALINE HCL PO Take 50 mg by mouth 2 times daily       Zinc 30 MG TABS        LORazepam (ATIVAN PO) Take 1 mg by mouth 3 times daily as needed for anxiety       Naproxen Sodium (ALEVE PO) Take 220 mg by mouth Takes 3 times daily       No facility-administered encounter medications on file as of 11/14/2018.              Review Of Systems  Skin: As above  Eyes: negative  Ears/Nose/Throat: negative  Respiratory: No shortness of breath, dyspnea on exertion, cough, or hemoptysis  Cardiovascular: negative  Gastrointestinal: negative  Genitourinary: negative  Musculoskeletal: negative  Neurologic:  negative  Psychiatric: negative  Hematologic/Lymphatic/Immunologic: negative  Endocrine: negative      O:   NAD, WDWN, Alert & Oriented, Mood & Affect wnl, Vitals stable   Here today alone   /73  Pulse 77  SpO2 94%   General appearance normal   Vitals stable   Alert, oriented and in no acute distress        Stuck on papules and brown macules on trunk and ext   Red papules on trunk     The remainder of expanded problem focused exam was unremarkable; the following areas were examined:  scalp/hair, conjunctiva/lids, face, neck, lips, chest, digits/nails, RUE, LUE.      Eyes: Conjunctivae/lids:Normal     ENT: Lips, buccal mucosa, tongue: normal    MSK:Normal    Cardiovascular: peripheral edema none    Pulm: Breathing Normal    Lymph Nodes: No Head and Neck Lymphadenopathy     Neuro/Psych: Orientation:Normal; Mood/Affect:Normal      A/P:  1. Seborrheic keratosis, lentigo, angioma,   BENIGN LESIONS DISCUSSED WITH PATIENT:  I discussed the specifics of tumor, prognosis, and genetics of benign lesions.  I explained that treatment of these lesions would be purely cosmetic and not medically neccessary.  I discussed with patient different removal options including excision, cautery and /or laser.      Nature and genetics of benign skin lesions dicussed with patient.  Signs and Symptoms of skin cancer discussed with patient.  ABCDEs of melanoma reviewed with patient.  Patient encouraged to perform monthly skin exams.  UV precautions reviewed with patient.  Patient to follow up with Primary Care provider regarding elevated blood pressure.  Skin care regimen reviewed with patient: Eliminate harsh soaps, i.e. Dial, zest, irsih spring; Mild soaps such as Cetaphil or Dove sensitive skin, avoid hot or cold showers, aggressive use of emollients including vanicream, cetaphil or cerave discussed with patient.    Risks of non-melanoma skin cancer discussed with patient   Return to clinic 12 months  Patient to follow up with  Primary Care provider regarding elevated blood pressure.        Again, thank you for allowing me to participate in the care of your patient.        Sincerely,        Mike Dias MD

## 2018-11-14 NOTE — MR AVS SNAPSHOT
"              After Visit Summary   2018    Dusty Ruggiero    MRN: [id number]           Patient Information     Date Of Birth          1945        Visit Information        Provider Department      2018 11:15 AM Mike Dias MD [facility]        Today's Diagnoses     SK (seborrheic keratosis)    -  1    Lentigo        Angioma           Follow-ups after your visit        Who to contact     If you have questions or need follow up information about today's clinic visit or your schedule please contact [facility] directly at [phone].  Normal or non-critical lab and imaging results will be communicated to you by [employer], letter or phone within 4 business days after the clinic has received the results. If you do not hear from us within 7 days, please contact the clinic through [employer] or phone. If you have a critical or abnormal lab result, we will notify you by phone as soon as possible.  Submit refill requests through [employer] or call your pharmacy and they will forward the refill request to us. Please allow 3 business days for your refill to be completed.          Additional Information About Your Visit        MyChart Information     [employer] lets you send messages to your doctor, view your test results, renew your prescriptions, schedule appointments and more. To sign up, go to [email or web address] . Click on \"Log in\" on the left side of the screen, which will take you to the Welcome page. Then click on \"Sign up Now\" on the right side of the page.     You will be asked to enter the access code listed below, as well as some personal information. Please follow the directions to create your username and password.     Your access code is: [email or web address]  Expires: 2019 11:56 AM     Your access code will  in 90 days. If you need help or a new code, please call your [facility] or [phone].        Care EveryWhere ID     This is your Care EveryWhere " ID. This could be used by other organizations to access your [facility] medical records  [id number]        Your Vitals Were     Pulse Pulse Oximetry                77 94%           Blood Pressure from Last 3 Encounters:   11/14/18 114/73   03/12/18 126/82   02/19/18 130/82    Weight from Last 3 Encounters:   03/12/18 83.9 kg (185 lb)   02/19/18 83.9 kg (185 lb)   05/03/16 67.4 kg (148 lb 8 oz)              Today, you had the following     No orders found for display       Primary Care Provider Office Phone # Fax #    Conrad Gonzalez [phone]       [facility] [address]  [facility] [address]        Equal Access to Services     BALTAZAR DUNLAP : Hadii aad ku hadasho Soomaali, waaxda luqadaha, qaybta kaalmada adeegyada, waxay laquita haynazian glen yee . So [facility] [phone].    ATENCIÓN: Si habla español, tiene a olvera disposición servicios gratuitos de asistencia lingüística. [facility] [phone].    We comply with applicable federal civil rights laws and [place] laws. We do not discriminate on the basis of race, color, national origin, age, disability, sex, sexual orientation, or gender identity.            Thank you!     Thank you for choosing [facility]  for your care. Our goal is always to provide you with excellent care. Hearing back from our patients is one way we can continue to improve our services. Please take a few minutes to complete the written survey that you may receive in the mail after your visit with us. Thank you!             Your Updated Medication List - Protect others around you: Learn how to safely use, store and throw away your medicines at www.disposemymeds.org.          This list is accurate as of 11/14/18 11:56 AM.  Always use your most recent med list.                   Brand Name Dispense Instructions for use Diagnosis    ALEVE PO      Take 220 mg by mouth Takes 3 times daily        ATIVAN PO      Take 1 mg by mouth 3 times daily  as needed for anxiety        Biotin 95491 MCG Tabs           COQ-10 PO      Take 1 tablet by mouth daily        ferrous sulfate 325 (65 Fe) MG tablet    IRON     Take 325 mg by mouth daily        MORPHINE SULFATE PO      Take 15 mg by mouth Twice daily        omeprazole 20 MG CR capsule    priLOSEC     1 CAPSULE by mouth daily as needed        PRAVASTATIN SODIUM PO      Take 40 mg by mouth every evening        RAMIPRIL PO      Take 10 mg by mouth daily        SERTRALINE HCL PO      Take 50 mg by mouth 2 times daily        VITAMIN D3 PO      Take 5,000 Units by mouth daily        Zinc 30 MG Tabs

## 2018-11-14 NOTE — NURSING NOTE
"Initial /73  Pulse 77  SpO2 94% Estimated body mass index is 25.44 kg/(m^2) as calculated from the following:    Height as of 3/12/18: 1.816 m (5' 11.5\").    Weight as of 3/12/18: 83.9 kg (185 lb). .    Angi Boyce LPN    "

## 2020-04-20 ENCOUNTER — OFFICE VISIT (OUTPATIENT)
Dept: DERMATOLOGY | Facility: CLINIC | Age: 75
End: 2020-04-20
Payer: COMMERCIAL

## 2020-04-20 VITALS — HEART RATE: 82 BPM | SYSTOLIC BLOOD PRESSURE: 150 MMHG | DIASTOLIC BLOOD PRESSURE: 80 MMHG | OXYGEN SATURATION: 96 %

## 2020-04-20 DIAGNOSIS — L71.0 PERIORAL DERMATITIS: Primary | ICD-10-CM

## 2020-04-20 PROCEDURE — 99214 OFFICE O/P EST MOD 30 MIN: CPT | Performed by: DERMATOLOGY

## 2020-04-20 RX ORDER — PIMECROLIMUS 10 MG/G
CREAM TOPICAL 2 TIMES DAILY
Qty: 60 G | Refills: 3 | Status: SHIPPED | OUTPATIENT
Start: 2020-04-20 | End: 2020-12-29

## 2020-04-20 RX ORDER — DOXYCYCLINE 100 MG/1
100 CAPSULE ORAL 2 TIMES DAILY
Qty: 60 CAPSULE | Refills: 3 | Status: SHIPPED | OUTPATIENT
Start: 2020-04-20 | End: 2020-12-15

## 2020-04-20 NOTE — LETTER
4/20/2020         RE: Dusty Ruggiero  [address]  [place] [address]        Dear Colleague,    Thank you for referring your patient, Dusty Ruggiero, to the [facility]. Please see a copy of my visit note below.    Dusty Ruggiero is a 74 year old year old male patient here today for rash on corners of mouth.   .  Patient states this has been present for weeks.  Patient reports the following symptoms:  Itching and pimples..  .  Patient reports the following previous treatments none.  These treatments did not work.  Patient reports the following modifying factors none.  Associated symptoms: none.  Patient has no other skin complaints today.  Remainder of the HPI, Meds, PMH, Allergies, FH, and SH was reviewed in chart.      Past Medical History:   Diagnosis Date     Generalized osteoarthrosis, unspecified site     Arthritis     Other anxiety states      Prostatitis, unspecified     History of Prostatitis     Ulcerative colitis, unspecified     History of Colitis     Unspecified essential hypertension        Past Surgical History:   Procedure Laterality Date     ARTHROSCOPY SHOULDER, OPEN ROTATOR CUFF REPAIR, COMBINED Right 3/4/2016    Procedure: COMBINED ARTHROSCOPY SHOULDER, OPEN ROTATOR CUFF REPAIR;  Surgeon: Wily Kan MD;  Location: [facility]     FLEXIBLE SIGMOIDOSCOPY  9/1999     PHACOEMULSIFICATION CLEAR CORNEA WITH TORIC INTRAOCULAR LENS IMPLANT Right 2/19/2018    Procedure: PHACOEMULSIFICATION CLEAR CORNEA WITH TORIC INTRAOCULAR LENS IMPLANT;  Right Cataract Removal with Implant--Toric Lens;  Surgeon: Mike Collier MD;  Location: [facility]     PHACOEMULSIFICATION CLEAR CORNEA WITH TORIC INTRAOCULAR LENS IMPLANT Left 3/12/2018    Procedure: PHACOEMULSIFICATION CLEAR CORNEA WITH TORIC INTRAOCULAR LENS IMPLANT;  Left Cataract Removal with Implant--Toric lens;  Surgeon: Mike Collier MD;  Location: [facility]     SURGICAL HISTORY OF -       Colostomy  with ileostomy      SURGICAL HISTORY OF -       Colostomy Reversal     SURGICAL HISTORY OF -       Left Shoulder Rotator Cuff Surgery        Family History   Problem Relation Age of Onset     Skin Cancer Father      Breast Cancer Sister      Melanoma No family hx of        Social History     Socioeconomic History     Marital status:      Spouse name: Not on file     Number of children: Not on file     Years of education: Not on file     Highest education level: Not on file   Occupational History     Not on file   Social Needs     Financial resource strain: Not on file     Food insecurity     Worry: Not on file     Inability: Not on file     Transportation needs     Medical: Not on file     Non-medical: Not on file   Tobacco Use     Smoking status: Former Smoker     Packs/day: 0.25     Years: 20.00     Pack years: 5.00     Types: Cigarettes     Last attempt to quit: 1995     Years since quittin.2     Smokeless tobacco: Never Used   Substance and Sexual Activity     Alcohol use: No     Comment: stopped a couple of months     Drug use: No     Sexual activity: Not on file   Lifestyle     Physical activity     Days per week: Not on file     Minutes per session: Not on file     Stress: Not on file   Relationships     Social connections     Talks on phone: Not on file     Gets together: Not on file     Attends Jewish service: Not on file     Active member of club or organization: Not on file     Attends meetings of clubs or organizations: Not on file     Relationship status: Not on file     Intimate partner violence     Fear of current or ex partner: Not on file     Emotionally abused: Not on file     Physically abused: Not on file     Forced sexual activity: Not on file   Other Topics Concern     Parent/sibling w/ CABG, MI or angioplasty before 65F 55M? No   Social History Narrative     Not on file       Outpatient Encounter Medications as of 2020   Medication Sig Dispense Refill     Biotin 86510 MCG TABS         Cholecalciferol (VITAMIN D3 PO) Take 5,000 Units by mouth daily       Coenzyme Q10 (COQ-10 PO) Take 1 tablet by mouth daily       ferrous sulfate 325 (65 FE) MG tablet Take 325 mg by mouth daily       LORazepam (ATIVAN PO) Take 1 mg by mouth 3 times daily as needed for anxiety       MORPHINE SULFATE PO Take 15 mg by mouth Twice daily       Naproxen Sodium (ALEVE PO) Take 220 mg by mouth Takes 3 times daily       OMEPRAZOLE 20 MG OR CPDR 1 CAPSULE by mouth daily as needed       PRAVASTATIN SODIUM PO Take 40 mg by mouth every evening       RAMIPRIL PO Take 10 mg by mouth daily       SERTRALINE HCL PO Take 50 mg by mouth 2 times daily       Zinc 30 MG TABS        No facility-administered encounter medications on file as of 4/20/2020.              Review Of Systems  Skin: As above  Eyes: negative  Ears/Nose/Throat: negative  Respiratory: No shortness of breath, dyspnea on exertion, cough, or hemoptysis  Cardiovascular: negative  Gastrointestinal: negative  Genitourinary: negative  Musculoskeletal: negative  Neurologic: negative  Psychiatric: negative  Hematologic/Lymphatic/Immunologic: negative  Endocrine: negative      O:   NAD, WDWN, Alert & Oriented, Mood & Affect wnl, Vitals stable   Here today alone   BP (!) 150/80   Pulse 82   SpO2 96%    General appearance normal   Vitals stable   Alert, oriented and in no acute distress     Perioral inflammatory scaly papule      The remainder of expanded problem focused exam was normal; the following areas were examined:  scalp/hair, conjunctiva/lids, face, neck, lips, chest, digits/nails, RUE, LUE.      Eyes: Conjunctivae/lids:Normal     ENT: Lips, buccal mucosa, tongue: normal    MSK:Normal    Cardiovascular: peripheral edema none    Pulm: Breathing Normal    Neuro/Psych: Orientation:Alert and Orientedx3 ; Mood/Affect:normal       A/P:  1. Perioral derm  Doxy twice daily  elidel twice daily  Pathophysiology discussed with pateint   Return to clinic 6 weeks  Skin care  regimen reviewed with patient: Eliminate harsh soaps, i.e. Dial, zest, irsih spring; Mild soaps such as Cetaphil or Dove sensitive skin, avoid hot or cold showers, aggressive use of emollients including vanicream, cetaphil or cerave discussed with patient.        Again, thank you for allowing me to participate in the care of your patient.        Sincerely,        Mike Dias MD

## 2020-04-20 NOTE — PROGRESS NOTES
Dusty Ruggiero is a 74 year old year old male patient here today for rash on corners of mouth.   .  Patient states this has been present for weeks.  Patient reports the following symptoms:  Itching and pimples..  .  Patient reports the following previous treatments none.  These treatments did not work.  Patient reports the following modifying factors none.  Associated symptoms: none.  Patient has no other skin complaints today.  Remainder of the HPI, Meds, PMH, Allergies, FH, and SH was reviewed in chart.      Past Medical History:   Diagnosis Date     Generalized osteoarthrosis, unspecified site     Arthritis     Other anxiety states      Prostatitis, unspecified     History of Prostatitis     Ulcerative colitis, unspecified     History of Colitis     Unspecified essential hypertension        Past Surgical History:   Procedure Laterality Date     ARTHROSCOPY SHOULDER, OPEN ROTATOR CUFF REPAIR, COMBINED Right 3/4/2016    Procedure: COMBINED ARTHROSCOPY SHOULDER, OPEN ROTATOR CUFF REPAIR;  Surgeon: Wily Kan MD;  Location: [facility]     FLEXIBLE SIGMOIDOSCOPY  9/1999     PHACOEMULSIFICATION CLEAR CORNEA WITH TORIC INTRAOCULAR LENS IMPLANT Right 2/19/2018    Procedure: PHACOEMULSIFICATION CLEAR CORNEA WITH TORIC INTRAOCULAR LENS IMPLANT;  Right Cataract Removal with Implant--Toric Lens;  Surgeon: Mike Collier MD;  Location: [facility]     PHACOEMULSIFICATION CLEAR CORNEA WITH TORIC INTRAOCULAR LENS IMPLANT Left 3/12/2018    Procedure: PHACOEMULSIFICATION CLEAR CORNEA WITH TORIC INTRAOCULAR LENS IMPLANT;  Left Cataract Removal with Implant--Toric lens;  Surgeon: Mike Collier MD;  Location: [facility]     SURGICAL HISTORY OF -       Colostomy  with ileostomy     SURGICAL HISTORY OF -   2000    Colostomy Reversal     SURGICAL HISTORY OF -       Left Shoulder Rotator Cuff Surgery        Family History   Problem Relation Age of Onset     Skin Cancer Father      Breast Cancer Sister      Melanoma No  family hx of        Social History     Socioeconomic History     Marital status:      Spouse name: Not on file     Number of children: Not on file     Years of education: Not on file     Highest education level: Not on file   Occupational History     Not on file   Social Needs     Financial resource strain: Not on file     Food insecurity     Worry: Not on file     Inability: Not on file     Transportation needs     Medical: Not on file     Non-medical: Not on file   Tobacco Use     Smoking status: Former Smoker     Packs/day: 0.25     Years: 20.00     Pack years: 5.00     Types: Cigarettes     Last attempt to quit: 1995     Years since quittin.2     Smokeless tobacco: Never Used   Substance and Sexual Activity     Alcohol use: No     Comment: stopped a couple of months     Drug use: No     Sexual activity: Not on file   Lifestyle     Physical activity     Days per week: Not on file     Minutes per session: Not on file     Stress: Not on file   Relationships     Social connections     Talks on phone: Not on file     Gets together: Not on file     Attends [employer] service: Not on file     Active member of club or organization: Not on file     Attends meetings of clubs or organizations: Not on file     Relationship status: Not on file     Intimate partner violence     Fear of current or ex partner: Not on file     Emotionally abused: Not on file     Physically abused: Not on file     Forced sexual activity: Not on file   Other Topics Concern     Parent/sibling w/ CABG, MI or angioplasty before 65F 55M? No   Social History Narrative     Not on file       Outpatient Encounter Medications as of 2020   Medication Sig Dispense Refill     Biotin 63031 MCG TABS        Cholecalciferol (VITAMIN D3 PO) Take 5,000 Units by mouth daily       Coenzyme Q10 (COQ-10 PO) Take 1 tablet by mouth daily       ferrous sulfate 325 (65 FE) MG tablet Take 325 mg by mouth daily       LORazepam (ATIVAN PO) Take 1 mg by  mouth 3 times daily as needed for anxiety       MORPHINE SULFATE PO Take 15 mg by mouth Twice daily       Naproxen Sodium (ALEVE PO) Take 220 mg by mouth Takes 3 times daily       OMEPRAZOLE 20 MG OR CPDR 1 CAPSULE by mouth daily as needed       PRAVASTATIN SODIUM PO Take 40 mg by mouth every evening       RAMIPRIL PO Take 10 mg by mouth daily       SERTRALINE HCL PO Take 50 mg by mouth 2 times daily       Zinc 30 MG TABS        No facility-administered encounter medications on file as of 4/20/2020.              Review Of Systems  Skin: As above  Eyes: negative  Ears/Nose/Throat: negative  Respiratory: No shortness of breath, dyspnea on exertion, cough, or hemoptysis  Cardiovascular: negative  Gastrointestinal: negative  Genitourinary: negative  Musculoskeletal: negative  Neurologic: negative  Psychiatric: negative  Hematologic/Lymphatic/Immunologic: negative  Endocrine: negative      O:   NAD, WDWN, Alert & Oriented, Mood & Affect wnl, Vitals stable   Here today alone   BP (!) 150/80   Pulse 82   SpO2 96%    General appearance normal   Vitals stable   Alert, oriented and in no acute distress     Perioral inflammatory scaly papule      The remainder of expanded problem focused exam was normal; the following areas were examined:  scalp/hair, conjunctiva/lids, face, neck, lips, chest, digits/nails, RUE, LUE.      Eyes: Conjunctivae/lids:Normal     ENT: Lips, buccal mucosa, tongue: normal    MSK:Normal    Cardiovascular: peripheral edema none    Pulm: Breathing Normal    Neuro/Psych: Orientation:Alert and Orientedx3 ; Mood/Affect:normal       A/P:  1. Perioral derm  Doxy twice daily  elidel twice daily  Pathophysiology discussed with pateint   Return to clinic 6 weeks  Skin care regimen reviewed with patient: Eliminate harsh soaps, i.e. Dial, zest, irsih spring; Mild soaps such as Cetaphil or Dove sensitive skin, avoid hot or cold showers, aggressive use of emollients including vanicream, cetaphil or cerave discussed  with patient.

## 2020-08-18 ENCOUNTER — HOSPITAL ENCOUNTER (EMERGENCY)
Facility: CLINIC | Age: 75
Discharge: HOME | End: 2020-08-18
Attending: NURSE PRACTITIONER | Admitting: NURSE PRACTITIONER
Payer: COMMERCIAL

## 2020-08-18 VITALS
DIASTOLIC BLOOD PRESSURE: 81 MMHG | TEMPERATURE: 99.7 F | BODY MASS INDEX: 23.52 KG/M2 | OXYGEN SATURATION: 98 % | WEIGHT: 171 LBS | SYSTOLIC BLOOD PRESSURE: 155 MMHG | RESPIRATION RATE: 20 BRPM

## 2020-08-18 DIAGNOSIS — F41.9 ANXIETY: ICD-10-CM

## 2020-08-18 PROBLEM — F32.5 MAJOR DEPRESSIVE DISORDER WITH SINGLE EPISODE, IN FULL REMISSION (H): Status: ACTIVE | Noted: 2018-08-16

## 2020-08-18 PROBLEM — E29.1 TESTICULAR HYPOFUNCTION: Status: ACTIVE | Noted: 2018-08-16

## 2020-08-18 PROBLEM — E78.2 MIXED HYPERLIPIDEMIA: Status: ACTIVE | Noted: 2017-10-02

## 2020-08-18 PROBLEM — D50.0 IRON DEFICIENCY ANEMIA DUE TO CHRONIC BLOOD LOSS: Status: ACTIVE | Noted: 2018-08-16

## 2020-08-18 PROBLEM — R00.2 PALPITATIONS: Status: ACTIVE | Noted: 2018-08-16

## 2020-08-18 PROBLEM — K21.9 ESOPHAGEAL REFLUX: Status: ACTIVE | Noted: 2018-10-17

## 2020-08-18 PROBLEM — G60.9 HEREDITARY AND IDIOPATHIC PERIPHERAL NEUROPATHY: Status: ACTIVE | Noted: 2018-08-16

## 2020-08-18 PROCEDURE — 99285 EMERGENCY DEPT VISIT HI MDM: CPT | Mod: 25 | Performed by: NURSE PRACTITIONER

## 2020-08-18 PROCEDURE — 90791 PSYCH DIAGNOSTIC EVALUATION: CPT

## 2020-08-18 PROCEDURE — 99284 EMERGENCY DEPT VISIT MOD MDM: CPT | Mod: Z6 | Performed by: NURSE PRACTITIONER

## 2020-08-18 ASSESSMENT — ENCOUNTER SYMPTOMS
CHILLS: 0
SHORTNESS OF BREATH: 0
CARDIOVASCULAR NEGATIVE: 1
NEUROLOGICAL NEGATIVE: 1
FEVER: 0
MUSCULOSKELETAL NEGATIVE: 1
GASTROINTESTINAL NEGATIVE: 1
SLEEP DISTURBANCE: 1
NERVOUS/ANXIOUS: 1
CONFUSION: 0
COUGH: 0
FATIGUE: 0
AGITATION: 0
HALLUCINATIONS: 0

## 2020-08-18 NOTE — DISCHARGE INSTRUCTIONS
Stop Diazepam.  Use Lorazepam 1 mg three times a day as needed for anxiety.  Increase Zoloft 50 mg (1/2 tab) in the morning and 100 mg  (1 tab) at bedtime.   Follow-up in clinic here on Friday at 9:00 am with Dr. Rubio to establish care and recheck anxiety.  Someone from the [facility] psychiatric services will call you tomorrow to assist in scheduling you for psychiatric and counseling appointments.  Return for any worsening or feeling unsafe or suicidal.

## 2020-08-18 NOTE — ED NOTES
Pt denies wanting to harm self or anyone, he states last noc after taking the diazepam it increases his anxiety and he didn't know what to do with himself however he denies suicidal thoughts.

## 2020-08-18 NOTE — ED AVS SNAPSHOT
[facility] Emergency Department  [address] [address]  Phone:  [phone]  Fax:  [phone]                                    Dusty Ruggiero   MRN: [id number]    Department:  [facility] Emergency Department   Date of Visit:  8/18/2020           After Visit Summary Signature Page    I have received my discharge instructions, and my questions have been answered. I have discussed any challenges I see with this plan with the nurse or doctor.    ..........................................................................................................................................  Patient/Patient Representative Signature      ..........................................................................................................................................  Patient Representative Print Name and Relationship to Patient    ..................................................               ................................................  Date                                   Time    ..........................................................................................................................................  Reviewed by Signature/Title    ...................................................              ..............................................  Date                                               Time          22EPIC Rev 08/18

## 2020-08-18 NOTE — ED NOTES
"Comes in with increased anxiety and has taken lorazepam for this for the past 20years. He comes in due to increased anxiety, nausea, dizziness and slight headache. Was seen yesterday in clinic was taken off lorazepam and started on diazepam and took this in the afternoon and states \"it only last 4 hours and has kept him up all noc\" he didn't know what to do so at 0500 he took 2 lorazepam which worked for him. Up this am felt good, around noon today he started with anxiety, dizziness, nausea and he called the clinic and was told to come to ER. He was to start Paxil however he has not done this due to insurance reasons. He was suppose to stop his Zoloft and start the Paxil and being he has not started the Paxil he continues to take Zoloft. He has been using lorazepam three times a day for the past 20 years and his PCP felt changing this would help with his mood swings and anxiety. Pt denies thoughts of hurting himself when he took the 2 lorazepam at 0500 today as he states \"I was just trying to control the anxiety\".     "

## 2020-08-18 NOTE — ED PROVIDER NOTES
History     Chief Complaint   Patient presents with     Anxiety     dizziness and nausea, uses lorazepam for for 20 years, took diazapam for first time yesterday with relief for 4 hours but then symptoms worsened     HPI  Dusty Ruggiero is a 75 year old male who presents to the emergency department for evaluation of anxiety.  --history of chronic anxiety. Taking Lorazepam 1 mg TID and Zoloft 50 mg BID for 20 years.  Evaluated at outside clinic yesterday for his physical and renewal of medications. He reported that the medications have not been as effective for his anxiety lately. Provider at clinic instructed him to stop Lorazepam and start Diazepam 2 mg BID PRN and stop Zoloft and start Paxil 30 mg daily.  Patient took Diazepam yesterday and felt good for about 3-4 hours, but then anxiety became worse than it has ever been. He was pacing all night, only 2 hours of sleep. This morning he felt so miserable that he took 2 tablets of Lorazepam (2mg), which he has never done before. States this was not an attempt to kill himself or suicidal gesture. He felt like he was at the end of his rope and needed to do something. He took another dose of lorazepam 1mg at 1430 today and was helpful. He has been unable to  the Paxil because it is not covered by insurance. He has been calling his clinic several times today but the provider is not in and the nurse told him to come to the ED. Denies SI.   --history of chronic dizziness. Has a referral and appt to see the Dizziness and Balance disorder clinic.  Scheduled for a head CT tomorrow.        Allergies:  Allergies   Allergen Reactions     Atenolol Shortness Of Breath     Other reaction(s): Unknown  Other reaction(s): Hard to breath     Atenolol Difficulty breathing     Rapid heart beat     Atorvastatin Muscle Pain (Myalgia)     Other reaction(s): Myalgias     Buspirone Difficulty breathing and Other (See Comments)     Other reaction(s): Unknown  Rapid heart beat      Gabapentin Other (See Comments)     Other reaction(s): Dizzy  Dizzy       Lyrica [Pregabalin] Other (See Comments)     Other reaction(s): dizziness  Dizzy       Benadryl [Diphenhydramine] Anxiety     Other reaction(s): excitablity     Vistaril [Hydroxyzine] Anxiety     Other reaction(s): dizzy,HA, nausea  Other reaction(s): dizzy,HA, nausea       Problem List:    Patient Active Problem List    Diagnosis Date Noted     Esophageal reflux 10/17/2018     Priority: Medium     Benign hypertension 08/16/2018     Priority: Medium     Hereditary and idiopathic peripheral neuropathy 08/16/2018     Priority: Medium     Iron deficiency anemia due to chronic blood loss 08/16/2018     Priority: Medium     Major depressive disorder with single episode, in full remission (H) 08/16/2018     Priority: Medium     Palpitations 08/16/2018     Priority: Medium     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     Priority: Medium     Mixed hyperlipidemia 10/02/2017     Priority: Medium     Benign prostatic hyperplasia with urinary obstruction 08/30/2013     Priority: Medium     Vitamin D deficiency 01/29/2013     Priority: Medium     Osteoarthritis 02/07/2009     Priority: Medium     DDD (degenerative disc disease), lumbar 02/07/2009     Priority: Medium     Degenerative arthritis of lumbar spine 02/07/2009     Priority: Medium     Ulcerative colitis (H) 02/07/2009     Priority: Medium     Anxiety state 12/06/2007     Priority: Medium     Knee joint replacement by other means 12/06/2007     Priority: Medium     Overview:   with Dr. Garcia 12-6-2007       Pure hypercholesterolemia 12/06/2007     Priority: Medium        Past Medical History:    Past Medical History:   Diagnosis Date     Generalized osteoarthrosis, unspecified site      Other anxiety states      Prostatitis, unspecified      Ulcerative colitis, unspecified      Unspecified essential hypertension        Past Surgical History:    Past Surgical History:   Procedure Laterality Date      ARTHROSCOPY SHOULDER, OPEN ROTATOR CUFF REPAIR, COMBINED Right 3/4/2016    Procedure: COMBINED ARTHROSCOPY SHOULDER, OPEN ROTATOR CUFF REPAIR;  Surgeon: Wily Kan MD;  Location: [facility]     FLEXIBLE SIGMOIDOSCOPY  1999     PHACOEMULSIFICATION CLEAR CORNEA WITH TORIC INTRAOCULAR LENS IMPLANT Right 2018    Procedure: PHACOEMULSIFICATION CLEAR CORNEA WITH TORIC INTRAOCULAR LENS IMPLANT;  Right Cataract Removal with Implant--Toric Lens;  Surgeon: Mike Collier MD;  Location: [facility]     PHACOEMULSIFICATION CLEAR CORNEA WITH TORIC INTRAOCULAR LENS IMPLANT Left 3/12/2018    Procedure: PHACOEMULSIFICATION CLEAR CORNEA WITH TORIC INTRAOCULAR LENS IMPLANT;  Left Cataract Removal with Implant--Toric lens;  Surgeon: Mike Collier MD;  Location: [facility]     SURGICAL HISTORY OF -       Colostomy  with ileostomy     SURGICAL HISTORY OF -       Colostomy Reversal     SURGICAL HISTORY OF -       Left Shoulder Rotator Cuff Surgery       Family History:    Family History   Problem Relation Age of Onset     Skin Cancer Father      Breast Cancer Sister      Melanoma No family hx of        Social History:  Marital Status:   [2]  Social History     Tobacco Use     Smoking status: Former Smoker     Packs/day: 0.25     Years: 20.00     Pack years: 5.00     Types: Cigarettes     Last attempt to quit: 1995     Years since quittin.5     Smokeless tobacco: Never Used   Substance Use Topics     Alcohol use: No     Comment: stopped a couple of months     Drug use: No        Medications:    Biotin 60281 MCG TABS  Cholecalciferol (VITAMIN D3 PO)  Coenzyme Q10 (COQ-10 PO)  doxycycline monohydrate (MONODOX) 100 MG capsule  ferrous sulfate 325 (65 FE) MG tablet  LORazepam (ATIVAN PO)  MORPHINE SULFATE PO  Naproxen Sodium (ALEVE PO)  OMEPRAZOLE 20 MG OR CPDR  pimecrolimus (ELIDEL) 1 % external cream  PRAVASTATIN SODIUM PO  RAMIPRIL PO  SERTRALINE HCL PO  Zinc 30 MG TABS          Review of Systems    Constitutional: Negative for chills, fatigue and fever.   HENT: Negative for congestion.    Respiratory: Negative for cough and shortness of breath.    Cardiovascular: Negative.    Gastrointestinal: Negative.    Genitourinary: Negative.    Musculoskeletal: Negative.    Skin: Negative.    Neurological: Negative.    Psychiatric/Behavioral: Positive for sleep disturbance. Negative for agitation, confusion, hallucinations and suicidal ideas. The patient is nervous/anxious.    All other systems reviewed and are negative.      Physical Exam   BP: (!) 155/81  Heart Rate: 90  Temp: 99.7  F (37.6  C)  Resp: 20  Weight: 77.6 kg (171 lb)  SpO2: 98 %      Physical Exam  Constitutional:       General: He is not in acute distress.     Appearance: Normal appearance. He is not ill-appearing.   Cardiovascular:      Rate and Rhythm: Normal rate and regular rhythm.   Pulmonary:      Effort: Pulmonary effort is normal.      Breath sounds: Normal breath sounds.   Neurological:      General: No focal deficit present.      Mental Status: He is alert and oriented to person, place, and time.   Psychiatric:         Attention and Perception: Attention and perception normal.         Mood and Affect: Mood is anxious.         Speech: Speech normal.         Behavior: Behavior normal.         Thought Content: Thought content normal.         Cognition and Memory: Cognition and memory normal.         ED Course        Procedures               No results found for this or any previous visit (from the past 24 hour(s)).    Medications - No data to display  1616: spoke on phone with Javier with [facility].   1800:  Javier with [facility] calling back. Provided his recommendations:  ---No indication for inpatient hospitalization  ---recommends behavior therapy and medication management with psychiatry.  ---Behavioral services will call patient tomorrow for follow-up/scheduling.    Assessments & Plan (with Medical Decision Making)   75 year old male who presents to  the emergency department for evaluation of anxiety.  --history of chronic anxiety. Taking Lorazepam 1 mg TID and Zoloft 50 mg BID for 20 years.  Evaluated at outside clinic yesterday for his physical and renewal of medications. He reported that the medications have not been as effective for his anxiety lately. Provider at clinic instructed him to stop Lorazepam and start Diazepam 2 mg BID PRN and stop Zoloft and start Paxil 30 mg daily.  Patient took Diazepam yesterday and felt good for about 3-4 hours, but then anxiety became worse than it has ever been. He was pacing all night, only 2 hours of sleep. This morning he felt so miserable that he took 2 tablets of Lorazepam (2mg), which he has never done before. States this was not an attempt to kill himself or suicidal gesture. He felt like he was at the end of his rope and needed to do something. He took another dose of lorazepam 1mg at 1430 today and was helpful. He has been unable to  the Paxil because it is not covered by insurance. He has been calling his clinic several times today but the provider is not in and the nurse told him to come to the ED. Denies SI.   --history of chronic dizziness. Has a referral and appt to see the Dizziness and Balance disorder clinic.  Scheduled for a head CT tomorrow.       On exam patient is pleasant. Hard of hearing. Sitting calmly. Does not appear anxious. Afebrile. No tachycardia. [facility] assessment with [facility] was completed today. Recommendations as follows:  ---No indication for inpatient hospitalization  ---recommends behavior therapy and medication management with psychiatry.  ---Behavioral services will call patient tomorrow for follow-up/scheduling.  I agree with these recommendations and patient appears safe for discharge. Patient does not have a computer or a cell phone so virtual appointment would not be possible. Since he had such a bad response to the Diazepam I recommend he stop this  medication. I also don't think that Benzodiazapine is a good medication for long-term management of anxiety. However, he has been on Lorazepam for so many years it would be difficult to get him off this medication. He is probably developing a tolerance for it so it is not as effective for his anxiety.  Since his insurance will not cover the Paxil I offered that his zoloft dose be increased to see if this improves his symptoms. Patient is agreeable to this. Pt would like to establish care here and an appointment was made for him today.     Plan as follows:  Stop Diazepam.  Use Lorazepam 1 mg three times a day as needed for anxiety.  Increase Zoloft 50 mg (1/2 tab) in the morning and 100 mg  (1 tab) at bedtime.   Follow-up in clinic here on Friday at 7:40 am with Dr. Kc to establish care and recheck anxiety.  Someone from the [facility] psychiatric services will call you tomorrow to assist in scheduling you for psychiatric and counseling appointments.  Return for any worsening or feeling unsafe or suicidal.       I have reviewed the nursing notes.    I have reviewed the findings, diagnosis, plan and need for follow up with the patient.      Discharge Medication List as of 8/18/2020  6:23 PM          Final diagnoses:   Anxiety       8/18/2020   [facility] EMERGENCY DEPARTMENT     Brittany Grace APRN CNP  08/18/20 [id number]

## 2020-08-21 ENCOUNTER — OFFICE VISIT (OUTPATIENT)
Dept: FAMILY MEDICINE | Facility: CLINIC | Age: 75
End: 2020-08-21
Payer: COMMERCIAL

## 2020-08-21 VITALS
HEIGHT: 70 IN | WEIGHT: 168 LBS | HEART RATE: 82 BPM | SYSTOLIC BLOOD PRESSURE: 142 MMHG | DIASTOLIC BLOOD PRESSURE: 74 MMHG | TEMPERATURE: 97.9 F | RESPIRATION RATE: 16 BRPM | BODY MASS INDEX: 24.05 KG/M2 | OXYGEN SATURATION: 96 %

## 2020-08-21 DIAGNOSIS — Z90.49 HISTORY OF COLECTOMY: Primary | ICD-10-CM

## 2020-08-21 DIAGNOSIS — F32.5 MAJOR DEPRESSIVE DISORDER WITH SINGLE EPISODE, IN FULL REMISSION (H): ICD-10-CM

## 2020-08-21 DIAGNOSIS — E78.2 MIXED HYPERLIPIDEMIA: ICD-10-CM

## 2020-08-21 DIAGNOSIS — F41.1 ANXIETY STATE: ICD-10-CM

## 2020-08-21 PROCEDURE — 99204 OFFICE O/P NEW MOD 45 MIN: CPT | Performed by: FAMILY MEDICINE

## 2020-08-21 RX ORDER — LORAZEPAM 1 MG/1
1 TABLET ORAL 3 TIMES DAILY PRN
Qty: 90 TABLET | Refills: 5 | Status: SHIPPED | OUTPATIENT
Start: 2020-08-21 | End: 2021-01-27

## 2020-08-21 RX ORDER — SERTRALINE HYDROCHLORIDE 100 MG/1
TABLET, FILM COATED ORAL
Qty: 135 TABLET | Refills: 3 | Status: SHIPPED | OUTPATIENT
Start: 2020-08-21 | End: 2021-06-30

## 2020-08-21 RX ORDER — PRAVASTATIN SODIUM 40 MG
40 TABLET ORAL EVERY EVENING
Qty: 90 TABLET | Refills: 3 | Status: SHIPPED | OUTPATIENT
Start: 2020-08-21 | End: 2021-06-30

## 2020-08-21 ASSESSMENT — MIFFLIN-ST. JEOR: SCORE: 1499.32

## 2020-08-21 NOTE — PATIENT INSTRUCTIONS
(F32.5) Major depressive disorder with single episode, in full remission (H)  Comment:   Plan: sertraline (ZOLOFT) 100 MG tablet        We discussed the history and the issues and med and non drug therapies.   Sertraline is refilled for one year. Call if any questions.     (F41.1) Anxiety state  Comment:   Plan: sertraline (ZOLOFT) 100 MG tablet, LORazepam         (ATIVAN) 1 MG tablet        See above. Use the Lorazepam at the lowest effective. Try reducing the dose occasionally.   Safety issues discussed. The written Rx is done for #90, with 5 refills.     (E78.2) Mixed hyperlipidemia  Comment:   Plan: pravastatin (PRAVACHOL) 40 MG tablet        Use the lower chol diet and daily exercise. Stay on the med and refills are done for one year.

## 2020-08-21 NOTE — NURSING NOTE
"Initial BP (!) 142/74   Pulse 82   Temp 97.9  F (36.6  C) (Tympanic)   Resp 16   Ht 1.772 m (5' 9.75\")   Wt 76.2 kg (168 lb)   SpO2 96%   BMI 24.28 kg/m   Estimated body mass index is 24.28 kg/m  as calculated from the following:    Height as of this encounter: 1.772 m (5' 9.75\").    Weight as of this encounter: 76.2 kg (168 lb). .    \  "

## 2020-08-21 NOTE — PROGRESS NOTES
"Dusty Ruggiero is a 75 year old male who is being evaluated via a billable telephone visit.      The patient has been notified of following:     \"This telephone visit will be conducted via a call between you and your physician/provider. We have found that certain health care needs can be provided without the need for a physical exam.  This service lets us provide the care you need with a short phone conversation.  If a prescription is necessary we can send it directly to your pharmacy.  If lab work is needed we can place an order for that and you can then stop by our lab to have the test done at a later time.    Telephone visits are billed at different rates depending on your insurance coverage. During this emergency period, for some insurers they may be billed the same as an in-person visit.  Please reach out to your insurance provider with any questions.    If during the course of the call the physician/provider feels a telephone visit is not appropriate, you will not be charged for this service.\"    Patient has given verbal consent for Telephone visit?  {YES-NO  Default Yes:4444::\"Yes\"}    What phone number would you like to be contacted at? ***    How would you like to obtain your AVS? {AVS Preference:703321}    Subjective     Dusty Ruggiero is a 75 year old male who presents via phone visit today for the following health issues:    HPI    {SUPERLIST (Optional):889366}  {PEDS Chronic and Acute Problems (Optional):470844}     {additonal problems for provider to add (Optional):783292}    Review of Systems   {ROS COMP (Optional):725848}       Objective          Vitals:  No vitals were obtained today due to virtual visit.    {GENERAL APPEARANCE:50::\"healthy\",\"alert\",\"no distress\"}  PSYCH: Alert and oriented times 3; coherent speech, normal   rate and volume, able to articulate logical thoughts, able   to abstract reason, no tangential thoughts, no hallucinations   or delusions  His affect is { " ":4378245::\"normal\"}  RESP: No cough, no audible wheezing, able to talk in full sentences  Remainder of exam unable to be completed due to telephone visits    {Diagnostic Test Results (Optional):013720}        Assessment/Plan:    {PROVIDER CHARTING PREFERENCE SOAPO:013674}    Phone call duration:  *** minutes                "

## 2020-08-21 NOTE — PROGRESS NOTES
"Dion Ruggiero is a 75 year old male who presents to clinic today for the following health issues:    HPI       ED/UC Followup:    Facility:   ED  Date of visit: 8/18/2020  Reason for visit: Anxiety  Current Status: improved with Zoloft.        Current Outpatient Medications   Medication Instructions     Biotin 30514 MCG TABS Oral     Cholecalciferol (VITAMIN D3 PO) 5,000 Units, Oral, DAILY     Coenzyme Q10 (COQ-10 PO) 1 tablet, Oral, DAILY     doxycycline monohydrate (MONODOX) 100 mg, Oral, 2 TIMES DAILY     ferrous sulfate (FEROSUL) 325 mg, Oral, DAILY     LORazepam (ATIVAN PO) 1 mg, Oral, 3 TIMES DAILY PRN     MORPHINE SULFATE PO 15 mg, Oral, Twice daily      Naproxen Sodium (ALEVE PO) 220 mg, Oral, Takes 3 times daily      OMEPRAZOLE 20 MG OR CPDR 1 CAPSULE by mouth daily as needed     pimecrolimus (ELIDEL) 1 % external cream Topical, 2 TIMES DAILY     PRAVASTATIN SODIUM PO 40 mg, Oral, EVERY EVENING     RAMIPRIL PO 10 mg, Oral, DAILY     SERTRALINE HCL PO 50 mg, Oral, 2 TIMES DAILY     Zinc 30 MG TABS Oral       Patient Active Problem List   Diagnosis     Osteoarthritis     DDD (degenerative disc disease), lumbar     Degenerative arthritis of lumbar spine     Ulcerative colitis (H)     Anxiety state     Benign hypertension     Benign prostatic hyperplasia with urinary obstruction     Esophageal reflux     Hereditary and idiopathic peripheral neuropathy     Iron deficiency anemia due to chronic blood loss     Knee joint replacement by other means     Major depressive disorder with single episode, in full remission (H)     Mixed hyperlipidemia     Palpitations     Pure hypercholesterolemia     Testicular hypofunction     Vitamin D deficiency     History of colectomy       Blood pressure (!) 142/74, pulse 82, temperature 97.9  F (36.6  C), temperature source Tympanic, resp. rate 16, height 1.772 m (5' 9.75\"), weight 76.2 kg (168 lb), SpO2 96 %.    Exam:  GENERAL APPEARANCE: healthy, alert and no " distress  EYES: EOMI,  PERRL  SKIN: no suspicious lesions or rashes  PSYCH: mentation appears normal and affect normal/bright      (F32.5) Major depressive disorder with single episode, in full remission (H)  Comment:   Plan: sertraline (ZOLOFT) 100 MG tablet        We discussed the history and the issues and med and non drug therapies.   Sertraline is refilled for one year. Call if any questions.     (F41.1) Anxiety state  Comment:   Plan: sertraline (ZOLOFT) 100 MG tablet, LORazepam         (ATIVAN) 1 MG tablet        See above. Use the Lorazepam at the lowest effective. Try reducing the dose occasionally.   Safety issues discussed. The written Rx is done for #90, with 5 refills.     (E78.2) Mixed hyperlipidemia  Comment:   Plan: pravastatin (PRAVACHOL) 40 MG tablet        Use the lower chol diet and daily exercise. Stay on the med and refills are done for one year.         Sahil Kc MD

## 2020-09-08 ENCOUNTER — TRANSFERRED RECORDS (OUTPATIENT)
Dept: HEALTH INFORMATION MANAGEMENT | Facility: CLINIC | Age: 75
End: 2020-09-08

## 2020-11-02 ENCOUNTER — TELEPHONE (OUTPATIENT)
Dept: FAMILY MEDICINE | Facility: CLINIC | Age: 75
End: 2020-11-02

## 2020-11-02 NOTE — TELEPHONE ENCOUNTER
Attempted to call the patient to ask about his cardiology referral.  Patient is out on his lawn tractor and his wife will have him call us back.  Looks like we have only seen this patient once.  What is this for? Misti HAYWOOD RN

## 2020-11-02 NOTE — TELEPHONE ENCOUNTER
Reason for Call: Request for an order or referral:    Order or referral being requested: Cardiology referral    Date needed: at your convenience    Has the patient been seen by the PCP for this problem? NO    Phone number Patient can be reached at:  Home number on file [phone] (home)    Best Time:  any    Can we leave a detailed message on this number?  YES    Call taken on 11/2/2020 at 2:49 PM by Елена Florian

## 2020-11-03 ENCOUNTER — OFFICE VISIT (OUTPATIENT)
Dept: FAMILY MEDICINE | Facility: CLINIC | Age: 75
End: 2020-11-03
Payer: COMMERCIAL

## 2020-11-03 DIAGNOSIS — E78.2 MIXED HYPERLIPIDEMIA: Primary | ICD-10-CM

## 2020-11-03 PROCEDURE — 99207 PR NO CHARGE NURSE ONLY: CPT

## 2020-11-03 NOTE — TELEPHONE ENCOUNTER
Patient's wife is calling, they are having [facility] fax ziopatch results to Dr. Kc. Then, they need referral to Cardiologist in [facility]. Stephanie Crisostomo on 11/3/2020 at 11:09 AM

## 2020-11-03 NOTE — NURSING NOTE
Pt completed and gave to this nurse his completed release of information requesting from [facility], his holter monitor that was completed in August.   Pt is asking [facility] to fax report to Dr Kc for further instructions.    Pt recently established care with Dr Kc.    I received the completed release.  I faxed it to [facility] [place] [place], [phone].  I sent the form to scanning.    Clarice Watson RN

## 2020-11-03 NOTE — TELEPHONE ENCOUNTER
I spoke with pt and wife.    [facility], Dr Cardoza, is unable to release the holter monitor report without a release.    Pt will come to clinic to complete the release form.  He will ask for this RN.    After holter report is received, pt will make telephone appt with Dr Kc to review results.  Pt believes he may need a referral to cardiology.    Clarice Watson RN

## 2020-11-03 NOTE — TELEPHONE ENCOUNTER
Patient is contacted and he apparently had a heart monitor ordered thru Dr. Cardoza.  We have nothing on this.  He can ask Dr. Cardoza for the cardiology referral as she order the holter monitor. If she is unwilling then have the holtor monitor faxed to us so Dr. Kc can review it and consider the referral.  Fax number provided. Misti HAYWOOD RN

## 2020-11-11 ENCOUNTER — VIRTUAL VISIT (OUTPATIENT)
Dept: FAMILY MEDICINE | Facility: CLINIC | Age: 75
End: 2020-11-11
Payer: COMMERCIAL

## 2020-11-11 DIAGNOSIS — K21.00 GASTROESOPHAGEAL REFLUX DISEASE WITH ESOPHAGITIS WITHOUT HEMORRHAGE: Primary | ICD-10-CM

## 2020-11-11 DIAGNOSIS — I47.20 VENTRICULAR TACHYCARDIA (H): ICD-10-CM

## 2020-11-11 PROCEDURE — 99213 OFFICE O/P EST LOW 20 MIN: CPT | Mod: 95 | Performed by: FAMILY MEDICINE

## 2020-11-11 RX ORDER — AMLODIPINE BESYLATE 5 MG/1
5 TABLET ORAL DAILY
COMMUNITY
End: 2020-12-24

## 2020-11-11 NOTE — PROGRESS NOTES
"Dusty Ruggiero is a 75 year old male who is being evaluated via a billable telephone visit.    Chief Complaint   Patient presents with     Results     holter monitor results     Refill Request     omeprazole       The patient has been notified of following:     \"This telephone visit will be conducted via a call between you and your physician/provider. We have found that certain health care needs can be provided without the need for a physical exam.  This service lets us provide the care you need with a short phone conversation.  If a prescription is necessary we can send it directly to your pharmacy.  If lab work is needed we can place an order for that and you can then stop by our lab to have the test done at a later time.    Telephone visits are billed at different rates depending on your insurance coverage. During this emergency period, for some insurers they may be billed the same as an in-person visit.  Please reach out to your insurance provider with any questions.    If during the course of the call the physician/provider feels a telephone visit is not appropriate, you will not be charged for this service.\"    Patient has given verbal consent for Telephone visit?  Yes    What phone number would you like to be contacted at? [phone]    How would you like to obtain your AVS? Mail a copy    Subjective     Dusty Ruggiero is a 75 year old male who presents via phone visit today for the following health issues:  Chief Complaint   Patient presents with     Results     holter monitor results     Refill Request     omeprazole   the Zio patch monitor from [facility] on 8-28-20 showed:   CONCLUSIONS:  1. Basic rhythm is normal sinus rhythm.  2. Short asymptomatic Non-sustained Ventricular tachycardia seen.  3. Asymptomatic runs of Supraventricular Tachycardia seen.  [facility]  Electronically Signed On 9- 14:08:13 CDT by Dallas Oliver MD    [facility]     Medication Followup of " omeprazole     Taking Medication as prescribed: yes    Side Effects:  None    Medication Helping Symptoms:  yes      (K21.00) Gastroesophageal reflux disease with esophagitis without hemorrhage  (primary encounter diagnosis)  Comment:   Plan: omeprazole (PRILOSEC) 20 MG DR capsule        Refills are done. Use the dietary and mechanical instructions. The goal for the symptoms is to be zero.     (I47.2) Ventricular tachycardia (H)  Comment: Monitor from 8-28-20; non-sustained.   Plan: we discussed the monitor results. There was non sustained V-tach. He will avoid caffeine and other stimulants.   Gentle exercise such as walking is encouraged, but avoid heavy exertion. Call 911 if there is lightheadedness, palpitations or shortness of breath.   The referral to Cardiology is done and he will call about that today.     Sahil Kc MD      Phone call duration:  15 minutes

## 2020-12-14 ENCOUNTER — PRE VISIT (OUTPATIENT)
Dept: CARDIOLOGY | Facility: CLINIC | Age: 75
End: 2020-12-14

## 2020-12-14 NOTE — TELEPHONE ENCOUNTER
Wellness Screening Tool    Symptom Screening:    Do you have one of the following NEW symptoms:      Fever (subjective or >100.0)?  No    New cough? No    Shortness of breath? No    Chills? No    New loss of taste or smell? No    Generalized body aches? No    New persistent headache? No    New sore throat? No    Nausea, vomiting or diarrhea? No    Within the past 2 weeks, have you been exposed to someone with a known positive illness below?      COVID - 19 (known or suspected) No    Chicken pox?  No    Measles? No    Pertussis? No    Have you had a positive COVID-19 diagnostic test (nasal swab test) in the last 14 days or are you currently   on self-quarantine restrictions (i.e.travel restriction, exposure, etc?) No        Patient notified of visitor restriction: Yes  Patient informed to wear a mask: Yes    Patient's appointment status: Patient will be seen in clinic as scheduled on 12/15/20

## 2020-12-15 ENCOUNTER — HOSPITAL ENCOUNTER (OUTPATIENT)
Dept: CARDIOLOGY | Facility: CLINIC | Age: 75
Discharge: HOME | End: 2020-12-15
Attending: INTERNAL MEDICINE | Admitting: INTERNAL MEDICINE
Payer: COMMERCIAL

## 2020-12-15 ENCOUNTER — OFFICE VISIT (OUTPATIENT)
Dept: CARDIOLOGY | Facility: CLINIC | Age: 75
End: 2020-12-15
Payer: COMMERCIAL

## 2020-12-15 VITALS
WEIGHT: 170.8 LBS | DIASTOLIC BLOOD PRESSURE: 76 MMHG | HEART RATE: 75 BPM | OXYGEN SATURATION: 98 % | SYSTOLIC BLOOD PRESSURE: 170 MMHG | BODY MASS INDEX: 24.68 KG/M2 | TEMPERATURE: 98.5 F

## 2020-12-15 DIAGNOSIS — I47.20 VENTRICULAR TACHYCARDIA (H): ICD-10-CM

## 2020-12-15 DIAGNOSIS — I25.10 CORONARY ARTERY DISEASE INVOLVING NATIVE HEART WITHOUT ANGINA PECTORIS, UNSPECIFIED VESSEL OR LESION TYPE: Primary | ICD-10-CM

## 2020-12-15 PROCEDURE — 93010 ELECTROCARDIOGRAM REPORT: CPT | Performed by: INTERNAL MEDICINE

## 2020-12-15 PROCEDURE — 93005 ELECTROCARDIOGRAM TRACING: CPT

## 2020-12-15 PROCEDURE — 99204 OFFICE O/P NEW MOD 45 MIN: CPT | Performed by: INTERNAL MEDICINE

## 2020-12-15 RX ORDER — CARVEDILOL 6.25 MG/1
6.25 TABLET ORAL 2 TIMES DAILY WITH MEALS
Qty: 60 TABLET | Refills: 3 | Status: SHIPPED | OUTPATIENT
Start: 2020-12-15 | End: 2020-12-17

## 2020-12-15 NOTE — PROGRESS NOTES
CARDIOLOGY CLINIC CONSULTATION    REASON FOR CONSULT: SOB and dizziness    PRIMARY CARE PHYSICIAN:  Sahil Kc    HISTORY OF PRESENT ILLNESS:  This is a very pleasant 75-year-old gentleman who denies any prior cardiovascular history.  He says he has a longstanding history of hypertension that has been uncontrolled.  He says his home blood pressures run in the 150s to 170s however he does not bother about it and he is not seek medical attention for it.  In the past he used to be on atenolol but started having anxiety and nervousness as a result of that.  He stopped it and now he is on amlodipine 5 mg daily but home blood pressures are still uncontrolled.  He denies any major cardiac symptoms except for mildly getting winded but otherwise he is mainly limited due to musculoskeletal and back issues.  He denies smoking or diabetes.  No classic symptoms of angina.  He says he had chronic dizziness for the last 6 years.  Sometimes he feels like the room is spinning around him.  He denies syncope or presyncope.  As a result of all of this he saw his PCP and Haily on 8/17/2020 and underwent a Zio patch monitor that showed a minimum heart rate of 45 and maximum heart rate of 250 bpm with an average rate of 87.  Predominant rhythm was sinus but there was one run of nonsustained ventricular tachycardia that lasted for 12 beats at a rate of 210 bpm.  There were multiple runs of supraventricular tachycardia however the longest episode was 13 seconds.  Given these findings he was referred to cardiology.    PAST MEDICAL HISTORY:  Past Medical History:   Diagnosis Date     Generalized osteoarthrosis, unspecified site     Arthritis     Other anxiety states      Prostatitis, unspecified     History of Prostatitis     Ulcerative colitis, unspecified     History of Colitis     Unspecified essential hypertension        MEDICATIONS:  Current Outpatient Medications   Medication     amLODIPine (NORVASC) 5 MG tablet     Biotin  51352 MCG TABS     carvedilol (COREG) 6.25 MG tablet     Cholecalciferol (VITAMIN D3 PO)     Coenzyme Q10 (COQ-10 PO)     ferrous sulfate 325 (65 FE) MG tablet     LORazepam (ATIVAN) 1 MG tablet     Naproxen Sodium (ALEVE PO)     omeprazole (PRILOSEC) 20 MG DR capsule     pimecrolimus (ELIDEL) 1 % external cream     pravastatin (PRAVACHOL) 40 MG tablet     sertraline (ZOLOFT) 100 MG tablet     Zinc 30 MG TABS     No current facility-administered medications for this visit.        ALLERGIES:  Allergies   Allergen Reactions     Atenolol Shortness Of Breath     Other reaction(s): Unknown  Other reaction(s): Hard to breath     Atenolol Difficulty breathing     Rapid heart beat     Atorvastatin Muscle Pain (Myalgia)     Other reaction(s): Myalgias     Buspirone Difficulty breathing and Other (See Comments)     Other reaction(s): Unknown  Rapid heart beat     Gabapentin Other (See Comments)     Other reaction(s): Dizzy  Dizzy       Lyrica [Pregabalin] Other (See Comments)     Other reaction(s): dizziness  Dizzy       Benadryl [Diphenhydramine] Anxiety     Other reaction(s): excitablity     Vistaril [Hydroxyzine] Anxiety     Other reaction(s): dizzy,HA, nausea         SOCIAL HISTORY:  I have reviewed this patient's social history and updated it with pertinent information if needed. Dusty Ruggiero  reports that he quit smoking about 25 years ago. His smoking use included cigarettes. He has a 5.00 pack-year smoking history. He has never used smokeless tobacco. He reports that he does not drink alcohol or use drugs.    FAMILY HISTORY:  I have reviewed this patient's family history and updated it with pertinent information if needed.   Family History   Problem Relation Age of Onset     Skin Cancer Father      Breast Cancer Sister      Melanoma No family hx of        REVIEW OF SYSTEMS:  Skin:  Negative     Eyes:  Negative    ENT:  Negative    Respiratory:  Negative    Cardiovascular:    Positive  for;lightheadedness;dizziness;fatigue  Gastroenterology: Negative    Genitourinary:  Negative    Musculoskeletal:  Positive for arthritis;joint pain;joint swelling;joint stiffness  Neurologic:  Positive for numbness or tingling of feet  Psychiatric:  Positive for anxiety;depression;sleep disturbances  Heme/Lymph/Imm:  Negative    Endocrine:  Negative        PHYSICAL EXAM:  Temp: 98.5  F (36.9  C) Temp src: Tympanic BP: (!) 170/76 Pulse: 75     SpO2: 98 %      Vital Signs with Ranges  Temp:  [98.5  F (36.9  C)] 98.5  F (36.9  C)  Pulse:  [75] 75  BP: (170)/(76) 170/76  SpO2:  [98 %] 98 %  170 lbs 12.8 oz    Constitutional: awake, alert, no distress  Eyes: PERRL, sclera nonicteric  ENT: trachea midline  Respiratory: Clear  Cardiovascular: Irregular heart sounds consistent with PACs seen on ECG, there is a 3 x 6 ejection systolic murmur best heard in the aortic area with a preserved S2  GI: nondistended, nontender  Musculoskeletal: good muscle tone, strength 5/5 in upper and lower extremities  Neurologic: no focal deficits  Neuropsychiatric: appropriate affact    DATA:  Labs: Pertinent cardiac labs reviewed    EKG: Normal sinus rhythm with right bundle branch block and PACs    ASSESSMENT:  Dizziness unlikely cardiac in etiology  Severely uncontrolled hypertension  Nonsustained ventricular tachycardia  High burden of PACs and multiple runs of nonsustained supraventricular tachycardia  Chronic back pain needing daily NSAIDs    RECOMMENDATIONS:  1. At this point I am not sure if his dizziness is cardiac in etiology given that it is positional and he feels like the room is spinning around him.  He has not had sigifredo syncope.  In any case given his multiple runs of supraventricular and some ventricular arrhythmias, I recommend getting a repeat Zio patch monitor after initiating 6.25 twice daily of carvedilol.  If no cardiac etiology is found I recommend neurology and ENT consultations.  2. Blood pressure is fairly  uncontrolled on 5 mg of amlodipine.  Let see what happens after adding carvedilol.  I have told him to write down readings daily and he will follow-up with us in clinic.  3. Given his nonsustained VT and mild dyspnea, I recommend getting a nuclear Lexiscan stress test.  He will not be able to exercise sufficiently given his back pain.  4. Given his murmur get an echocardiogram.  5. Connect with me in clinic in about 4 to 6 weeks from now with these test findings.  6. Try to minimize NSAID use discussed with PCP  7. Continue pravastatin that he is been taking for hyperlipidemia last LDL in Care Everywhere was 82.    JAYSON Lambert, [facility]  Cardiology - [facility]  December 15, 2020

## 2020-12-15 NOTE — LETTER
12/15/2020    Sahil Kc MD  [address] [address]    RE: Dusty Ruggiero       Dear Colleague,    I had the pleasure of seeing Dustymakeda Ruggiero in the [facility].    CARDIOLOGY CLINIC CONSULTATION    REASON FOR CONSULT: SOB and dizziness    PRIMARY CARE PHYSICIAN:  Sahil Kc    HISTORY OF PRESENT ILLNESS:  This is a very pleasant 75-year-old gentleman who denies any prior cardiovascular history.  He says he has a longstanding history of hypertension that has been uncontrolled.  He says his home blood pressures run in the 150s to 170s however he does not bother about it and he is not seek medical attention for it.  In the past he used to be on atenolol but started having anxiety and nervousness as a result of that.  He stopped it and now he is on amlodipine 5 mg daily but home blood pressures are still uncontrolled.  He denies any major cardiac symptoms except for mildly getting winded but otherwise he is mainly limited due to musculoskeletal and back issues.  He denies smoking or diabetes.  No classic symptoms of angina.  He says he had chronic dizziness for the last 6 years.  Sometimes he feels like the room is spinning around him.  He denies syncope or presyncope.  As a result of all of this he saw his PCP and Haily on 8/17/2020 and underwent a Zio patch monitor that showed a minimum heart rate of 45 and maximum heart rate of 250 bpm with an average rate of 87.  Predominant rhythm was sinus but there was one run of nonsustained ventricular tachycardia that lasted for 12 beats at a rate of 210 bpm.  There were multiple runs of supraventricular tachycardia however the longest episode was 13 seconds.  Given these findings he was referred to cardiology.    PAST MEDICAL HISTORY:  Past Medical History:   Diagnosis Date     Generalized osteoarthrosis, unspecified site     Arthritis     Other anxiety states      Prostatitis, unspecified     History of  Prostatitis     Ulcerative colitis, unspecified     History of Colitis     Unspecified essential hypertension        MEDICATIONS:  Current Outpatient Medications   Medication     amLODIPine (NORVASC) 5 MG tablet     Biotin 02445 MCG TABS     carvedilol (COREG) 6.25 MG tablet     Cholecalciferol (VITAMIN D3 PO)     Coenzyme Q10 (COQ-10 PO)     ferrous sulfate 325 (65 FE) MG tablet     LORazepam (ATIVAN) 1 MG tablet     Naproxen Sodium (ALEVE PO)     omeprazole (PRILOSEC) 20 MG DR capsule     pimecrolimus (ELIDEL) 1 % external cream     pravastatin (PRAVACHOL) 40 MG tablet     sertraline (ZOLOFT) 100 MG tablet     Zinc 30 MG TABS     No current facility-administered medications for this visit.        ALLERGIES:  Allergies   Allergen Reactions     Atenolol Shortness Of Breath     Other reaction(s): Unknown  Other reaction(s): Hard to breath     Atenolol Difficulty breathing     Rapid heart beat     Atorvastatin Muscle Pain (Myalgia)     Other reaction(s): Myalgias     Buspirone Difficulty breathing and Other (See Comments)     Other reaction(s): Unknown  Rapid heart beat     Gabapentin Other (See Comments)     Other reaction(s): Dizzy  Dizzy       Lyrica [Pregabalin] Other (See Comments)     Other reaction(s): dizziness  Dizzy       Benadryl [Diphenhydramine] Anxiety     Other reaction(s): excitablity     Vistaril [Hydroxyzine] Anxiety     Other reaction(s): dizzy,HA, nausea         SOCIAL HISTORY:  I have reviewed this patient's social history and updated it with pertinent information if needed. Dusty Ruggiero  reports that he quit smoking about 25 years ago. His smoking use included cigarettes. He has a 5.00 pack-year smoking history. He has never used smokeless tobacco. He reports that he does not drink alcohol or use drugs.    FAMILY HISTORY:  I have reviewed this patient's family history and updated it with pertinent information if needed.   Family History   Problem Relation Age of Onset     Skin Cancer Father       Breast Cancer Sister      Melanoma No family hx of        REVIEW OF SYSTEMS:  Skin:  Negative     Eyes:  Negative    ENT:  Negative    Respiratory:  Negative    Cardiovascular:    Positive for;lightheadedness;dizziness;fatigue  Gastroenterology: Negative    Genitourinary:  Negative    Musculoskeletal:  Positive for arthritis;joint pain;joint swelling;joint stiffness  Neurologic:  Positive for numbness or tingling of feet  Psychiatric:  Positive for anxiety;depression;sleep disturbances  Heme/Lymph/Imm:  Negative    Endocrine:  Negative        PHYSICAL EXAM:  Temp: 98.5  F (36.9  C) Temp src: Tympanic BP: (!) 170/76 Pulse: 75     SpO2: 98 %      Vital Signs with Ranges  Temp:  [98.5  F (36.9  C)] 98.5  F (36.9  C)  Pulse:  [75] 75  BP: (170)/(76) 170/76  SpO2:  [98 %] 98 %  170 lbs 12.8 oz    Constitutional: awake, alert, no distress  Eyes: PERRL, sclera nonicteric  ENT: trachea midline  Respiratory: Clear  Cardiovascular: Irregular heart sounds consistent with PACs seen on ECG, there is a 3 x 6 ejection systolic murmur best heard in the aortic area with a preserved S2  GI: nondistended, nontender  Musculoskeletal: good muscle tone, strength 5/5 in upper and lower extremities  Neurologic: no focal deficits  Neuropsychiatric: appropriate affact    DATA:  Labs: Pertinent cardiac labs reviewed    EKG: Normal sinus rhythm with right bundle branch block and PACs    ASSESSMENT:  Dizziness unlikely cardiac in etiology  Severely uncontrolled hypertension  Nonsustained ventricular tachycardia  High burden of PACs and multiple runs of nonsustained supraventricular tachycardia  Chronic back pain needing daily NSAIDs    RECOMMENDATIONS:  1. At this point I am not sure if his dizziness is cardiac in etiology given that it is positional and he feels like the room is spinning around him.  He has not had sigifredo syncope.  In any case given his multiple runs of supraventricular and some ventricular arrhythmias, I recommend getting a  repeat Zio patch monitor after initiating 6.25 twice daily of carvedilol.  If no cardiac etiology is found I recommend neurology and ENT consultations.  2. Blood pressure is fairly uncontrolled on 5 mg of amlodipine.  Let see what happens after adding carvedilol.  I have told him to write down readings daily and he will follow-up with us in clinic.  3. Given his nonsustained VT and mild dyspnea, I recommend getting a nuclear Lexiscan stress test.  He will not be able to exercise sufficiently given his back pain.  4. Given his murmur get an echocardiogram.  5. Connect with me in clinic in about 4 to 6 weeks from now with these test findings.  6. Try to minimize NSAID use discussed with PCP  7. Continue pravastatin that he is been taking for hyperlipidemia last LDL in Care Everywhere was 82.    JAYSON Lambert, [facility]  Cardiology - [facility]  December 15, 2020      Thank you for allowing me to participate in the care of your patient.    Sincerely,     Giorgio Cisneros MD     [facility] [place]

## 2020-12-16 ENCOUNTER — HOSPITAL ENCOUNTER (OUTPATIENT)
Dept: PHYSICAL THERAPY | Facility: CLINIC | Age: 75
Setting detail: THERAPIES SERIES
End: 2020-12-16
Attending: PHYSICIAN ASSISTANT
Payer: COMMERCIAL

## 2020-12-16 PROCEDURE — 97161 PT EVAL LOW COMPLEX 20 MIN: CPT | Mod: GP | Performed by: PHYSICAL MEDICINE & REHABILITATION

## 2020-12-16 NOTE — PROGRESS NOTES
"   12/16/20 0900   General Information   Type of Visit Initial OP Ortho PT Evaluation   Start of Care Date 12/16/20   Referring Physician Yuniel Souza   Patient/Family Goals Statement improve LBP   Orders Evaluate and Treat   Orders Comment \"1x/week for 4 weeks. Eval and tx pt PRN, initiate HEP. trunk stabilization\"   Date of Order 12/14/20   Certification Required? No  ([employer])   Medical Diagnosis 1. chronic low back pain; 2. multi-level DDD/facet joint arthropathy; 3. grade 1 spondylolisthesis of L4 on L5 and L5 on S1, 4. moderate central spinal stenosis at L4-5; 5. Left L2-3, L3-4, L4-5 and L5-S1 neuroforaminal stenosis; 6. L L2-3 and L3-4 lateral recess stenosis   Surgical/Medical history reviewed Yes   Precautions/Limitations no known precautions/limitations   General Information Comments PMHx per pt report: depression, high BP, arthritis, RCR B, surgery on R knee and prostate   Body Part(s)   Body Part(s) Lumbar Spine/SI   Presentation and Etiology   Pertinent history of current problem (include personal factors and/or comorbidities that impact the POC) Pt arrived to PT today for chronic LBP that started 30 years ago. 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 Notes the more physical labor, the more pain. Notes pain along entire low back, some times more L and sometimes more R. Takes alieve occasionally but going to f/u with primary care regarding pain meds as it afftects heart. Has stress test and echo next week. Notes dizziness, related to vestibular sx. Notes ear ache on L that comes and goes. Went to \"dizzy clinic\" and exercises made LBP worse and therefore discontinued. Reports only coming to PT 4 times as MD recommended, wants to \"have this on my record in case I ever want surgery.\"   Impairments A. Pain;F. Decreased strength and endurance;G. Impaired balance;O. Blurred vision   Functional Limitations perform activities " of daily living;perform desired leisure / sports activities   Symptom Location LBP (R and L)   How/Where did it occur With repetition/overuse;From insidious onset;From Degenerative Joint Disease   Onset date of current episode/exacerbation 07/01/20  (got worse over the summer, started 30 yrs ago)   Chronicity Chronic   Pain rating (0-10 point scale) Best (/10);Worst (/10)   Best (/10) 3   Worst (/10) 8   Pain quality C. Aching   Frequency of pain/symptoms A. Constant   Pain/symptoms are: The same all the time   Pain/symptoms exacerbated by D. Carrying;G. Certain positions;L. Work tasks   Pain/symptoms eased by I. OTC medication(s)   Progression of symptoms since onset: Worsened   Prior Level of Function   Prior Level of Function-Mobility independent   Prior Level of Function-ADLs independent   Current Level of Function   Patient role/employment history F. Retired   [facility]   Current equipment-Gait/Locomotion None   Fall Risk Screen   Fall screen completed by PT   Have you fallen 2 or more times in the past year? Yes   Have you fallen and had an injury in the past year? Yes   Is patient a fall risk? Yes;Department fall risk interventions implemented   Fall screen comments Notes 2 falls in the last year, both tripping over objects (elliptical and --notes difficulty with lifting legs). Has hx of vertigo   Abuse Screen (yes response referral indicated)   Feels Unsafe at Home or Work/School no   Feels Threatened by Someone no   Does Anyone Try to Keep You From Having Contact with Others or Doing Things Outside Your Home? no   Physical Signs of Abuse Present no   System Outcome Measures   Outcome Measures Low Back Pain (see Oswestry and Danny)   Lumbar Spine/SI Objective Findings   Observation increased anterior pelvic tilt supine. Limited knee extension supine on L   Posture posterior pelvic tilt, knee flexion (tight hamstrings), rounded shoulders   Gait/Locomotion decresaed foot  clearance during amb, short stride length   Balance/Proprioception (Single Leg Stance) R: mod-max sway, 25 seconds, L hip drop.  L: min-mod sway, 30 seconds, level hips   Flexion ROM fingertips to floor (knees bent)   Extension % limited   Right Side Bending ROM superior patella   Left Side Bending ROM superior patella   Repeated Extension-Standing ROM neg   Repeated Flexion-Standing ROM neg   Lumbar ROM Comment no increase in sx with AROM, notes OTC pain medications on board and causes limited pain with movement otherwise all movement usually painful   Pelvic Screen neg SIJ compression and distraction. level innominates   Hip Screen pos scour on R, neg on L.   Pos LUCITA on L, neg on R   Hip Flexion (L2) Strength 4+/5 B (noted slight increase in sx)   Hip Abduction Strength seated: 5/5 B   Hip Adduction Strength seated: 5/5 B   Hip Extension Strength able to perform supine bridge with min-mod difficulty   Knee Flexion Strength 5/5 B   Knee Extension (L3) Strength 5/5 B   Ankle Dorsiflexion (L4) Strength 5/5 B   Great Toe Extension (L5) Strength 5/5 B   Ankle Plantar Flexion (S1) Strength 5/5 B   Hamstring Flexibility R:50*, L:55*   Hip Flexor Flexibility limited B   Quadricep Flexibility limited B   Piriformis Flexibility limited B (R>L)   Lumbar/SI Flexibility Comments noted increased sx with PROM IR and ER B   SLR neg on R, pos on L   Javier Test limited 1 jt and 2 jt hip flexors   Crossover SLR neg B   Slump Test neg   Lumbar/SI Special Tests Comments difficulty with laying prone   Segmental Mobility hypomobility throughout lumbar spine with muscle guarding   Sensation Testing normal dull touch sensation amongst B LE in dermatomal pattern   Palpation noted no increase in sx with mod-max palpation of lumbar spine soft tissues and bony prominances   Planned Therapy Interventions   Planned Therapy Interventions ADL retraining;bed mobility training;gait training;joint mobilization;manual therapy;motor  coordination training;neuromuscular re-education;ROM;strengthening;stretching;transfer training   Planned Modality Interventions   Planned Modality Interventions Cryotherapy;Electrical stimulation;Hot packs;TENS;Traction;Ultrasound   Planned Modality Interventions Comments only as needed   Clinical Impression   Criteria for Skilled Therapeutic Interventions Met yes, treatment indicated   PT Diagnosis chronic LBP secondary to stenosis and spondylolisthesis   Influenced by the following impairments decreased ROM, decreased strength, pain   Functional limitations due to impairments sleeping, standing, increased activity (yardwork)   Clinical Presentation Stable/Uncomplicated   Clinical Presentation Rationale PLOF, ROM, strength, JULIET, lea, clinical judgement   Clinical Decision Making (Complexity) Low complexity   Therapy Frequency 1 time/week   Predicted Duration of Therapy Intervention (days/wks) 4 weeks   Risk & Benefits of therapy have been explained Yes   Patient, Family & other staff in agreement with plan of care Yes   Clinical Impression Comments Pt is a 75 y.o. male who presented to the PT clinic today with a rehab diagnosis of chronic LBP secondary to stenosis and spondylolisthesis as evidenced by decreased ROM, decreased strength and pain. Pt is appropriate for skilled PT to address previously listed impairments in order to decrease difficulty with sleeping, standing, and yardwork.    Education Assessment   Preferred Learning Style Listening;Reading;Demonstration;Pictures/video   Barriers to Learning No barriers   ORTHO GOALS   PT Ortho Eval Goals 1;2;3;4   Ortho Goal 1   Goal Identifier 1   Goal Description Pt will be able to sleep 2 hours without being woken up by LBP.    Target Date 01/15/21   Ortho Goal 2   Goal Identifier 2   Goal Description Pt will be able to stand >15 minutes without increase in LBP in order to decrease difficulty with ADLs.    Target Date 01/13/21   Ortho Goal 3   Goal Identifier 3    Goal Description Pt will be independent with HEP in order to self manage symptoms.   Target Date 01/31/21   Ortho Goal 4   Goal Identifier 4   Goal Description Pt will be able to demonstrate good TrA set, without increase in sx, in order to demonstrate improved core stability and decreased pain with yardwork   Target Date 01/31/21   Total Evaluation Time   PT Eval, Low Complexity Minutes (25756) 30       Please contact me with any questions or concerns.    Thank you for your referral,     Saranya Peng, PT, DPT  Physical Therapist  [facility]  [address] [place] [address] [phone]

## 2020-12-17 ENCOUNTER — TELEPHONE (OUTPATIENT)
Dept: CARDIOLOGY | Facility: CLINIC | Age: 75
End: 2020-12-17

## 2020-12-17 DIAGNOSIS — I47.20 VENTRICULAR TACHYCARDIA (H): ICD-10-CM

## 2020-12-17 DIAGNOSIS — I25.10 CORONARY ARTERY DISEASE INVOLVING NATIVE HEART WITHOUT ANGINA PECTORIS, UNSPECIFIED VESSEL OR LESION TYPE: ICD-10-CM

## 2020-12-17 RX ORDER — CARVEDILOL 3.12 MG/1
6.25 TABLET ORAL 2 TIMES DAILY WITH MEALS
Start: 2020-12-17 | End: 2020-12-17

## 2020-12-17 RX ORDER — CARVEDILOL 3.12 MG/1
3.12 TABLET ORAL 2 TIMES DAILY WITH MEALS
Start: 2020-12-17 | End: 2020-12-29 | Stop reason: SINTOL

## 2020-12-17 NOTE — TELEPHONE ENCOUNTER
I would suggest he try a lower dose of 3.125 mg BID and see what happens. If he does not want to take that, then I would suggest increasing his amlodipine to 10 mg daily. He needs to take his BP other wise we cannot manage his anti HTN meds safely. Please alert PCP on HTN management too, thank you. K    ADDENDUM: Disc with patient. He will try the carvedilol 3.125 mg BID first and if that causes problems he will increase amlodipine. He will log his BP and update us with how things are going and which medicine he is taking. Bijal Lerner RN Cardiology December 17, 2020, 1:51 PM

## 2020-12-17 NOTE — TELEPHONE ENCOUNTER
"Pt called in to report that Dr Cisneros started him on carvedilol 6.25 mg BID on 12/15/20. States he took 2 doses and it made him extremely dizzy so he stopped. States it was the same dizziness he has been having, just worse. When I asked if it possibly could have been coincidental, he said no. States he took his BP at the time and it was \"high\". He would give me no numbers and just said \"I will not take that drug again\". Will discuss with Dr Cisneros for recommendations. Bijal Lerner RN Cardiology December 17, 2020, 11:46 AM    "

## 2020-12-23 ENCOUNTER — TELEPHONE (OUTPATIENT)
Dept: CARDIOLOGY | Facility: CLINIC | Age: 75
End: 2020-12-23

## 2020-12-23 ENCOUNTER — HOSPITAL ENCOUNTER (OUTPATIENT)
Dept: NUCLEAR MEDICINE | Facility: CLINIC | Age: 75
Setting detail: NUCLEAR MEDICINE
End: 2020-12-23
Attending: INTERNAL MEDICINE
Payer: COMMERCIAL

## 2020-12-23 ENCOUNTER — HOSPITAL ENCOUNTER (OUTPATIENT)
Dept: CARDIOLOGY | Facility: CLINIC | Age: 75
End: 2020-12-23
Attending: INTERNAL MEDICINE
Payer: COMMERCIAL

## 2020-12-23 VITALS — WEIGHT: 170 LBS | HEIGHT: 71 IN | BODY MASS INDEX: 23.8 KG/M2

## 2020-12-23 DIAGNOSIS — I47.20 VENTRICULAR TACHYCARDIA (H): ICD-10-CM

## 2020-12-23 DIAGNOSIS — I25.10 CORONARY ARTERY DISEASE INVOLVING NATIVE HEART WITHOUT ANGINA PECTORIS, UNSPECIFIED VESSEL OR LESION TYPE: ICD-10-CM

## 2020-12-23 LAB
CV BLOOD PRESSURE: 44 %
CV STRESS MAX HR HE: 96
NUC STRESS EJECTION FRACTION: 48 %
RATE PRESSURE PRODUCT: NORMAL
STRESS ECHO BASELINE DIASTOLIC HE: 76
STRESS ECHO BASELINE HR: 69
STRESS ECHO BASELINE SYSTOLIC BP: 164
STRESS ECHO CALCULATED PERCENT HR: 66 %
STRESS ECHO LAST STRESS DIASTOLIC BP: 78
STRESS ECHO LAST STRESS SYSTOLIC BP: 158
STRESS ECHO TARGET HR: 145

## 2020-12-23 PROCEDURE — 78452 HT MUSCLE IMAGE SPECT MULT: CPT | Mod: 26 | Performed by: INTERNAL MEDICINE

## 2020-12-23 PROCEDURE — 0296T LEADLESS EKG MONITOR 3 TO 14 DAYS: CPT

## 2020-12-23 PROCEDURE — A9502 TC99M TETROFOSMIN: HCPCS | Performed by: INTERNAL MEDICINE

## 2020-12-23 PROCEDURE — 343N000001 HC RX 343: Performed by: INTERNAL MEDICINE

## 2020-12-23 PROCEDURE — 78452 HT MUSCLE IMAGE SPECT MULT: CPT

## 2020-12-23 PROCEDURE — 93017 CV STRESS TEST TRACING ONLY: CPT

## 2020-12-23 PROCEDURE — 93016 CV STRESS TEST SUPVJ ONLY: CPT | Performed by: INTERNAL MEDICINE

## 2020-12-23 PROCEDURE — 93306 TTE W/DOPPLER COMPLETE: CPT

## 2020-12-23 PROCEDURE — 93018 CV STRESS TEST I&R ONLY: CPT | Performed by: INTERNAL MEDICINE

## 2020-12-23 PROCEDURE — 255N000002 HC RX 255 OP 636: Performed by: INTERNAL MEDICINE

## 2020-12-23 PROCEDURE — 250N000011 HC RX IP 250 OP 636: Performed by: INTERNAL MEDICINE

## 2020-12-23 PROCEDURE — 93306 TTE W/DOPPLER COMPLETE: CPT | Mod: 26 | Performed by: INTERNAL MEDICINE

## 2020-12-23 PROCEDURE — 0298T LEADLESS EKG MONITOR 3 TO 14 DAYS: CPT | Performed by: INTERNAL MEDICINE

## 2020-12-23 RX ORDER — REGADENOSON 0.08 MG/ML
0.4 INJECTION, SOLUTION INTRAVENOUS ONCE
Status: COMPLETED | OUTPATIENT
Start: 2020-12-23 | End: 2020-12-23

## 2020-12-23 RX ADMIN — REGADENOSON 0.4 MG: 0.08 INJECTION, SOLUTION INTRAVENOUS at 10:49

## 2020-12-23 RX ADMIN — HUMAN ALBUMIN MICROSPHERES AND PERFLUTREN 2 ML: 10; .22 INJECTION, SOLUTION INTRAVENOUS at 12:55

## 2020-12-23 RX ADMIN — TETROFOSMIN 29.9 MCI.: 1.38 INJECTION, POWDER, LYOPHILIZED, FOR SOLUTION INTRAVENOUS at 10:50

## 2020-12-23 RX ADMIN — TETROFOSMIN 10.5 MCI.: 1.38 INJECTION, POWDER, LYOPHILIZED, FOR SOLUTION INTRAVENOUS at 08:50

## 2020-12-23 ASSESSMENT — MIFFLIN-ST. JEOR: SCORE: 1528.24

## 2020-12-23 NOTE — TELEPHONE ENCOUNTER
Routed ABN stress result to Dr Cisneros.    Per Dr Cisneros - to have Dr Angel review test as he is on vacation.    Sent text to Dr Angel to request review.    AMIRAH CHEUNG RN on 12/23/2020 at 5:04 PM

## 2020-12-24 ENCOUNTER — VIRTUAL VISIT (OUTPATIENT)
Dept: FAMILY MEDICINE | Facility: CLINIC | Age: 75
End: 2020-12-24
Payer: COMMERCIAL

## 2020-12-24 DIAGNOSIS — I10 BENIGN HYPERTENSION: Primary | ICD-10-CM

## 2020-12-24 DIAGNOSIS — M47.26 OSTEOARTHRITIS OF SPINE WITH RADICULOPATHY, LUMBAR REGION: ICD-10-CM

## 2020-12-24 PROCEDURE — 99214 OFFICE O/P EST MOD 30 MIN: CPT | Mod: 95 | Performed by: FAMILY MEDICINE

## 2020-12-24 RX ORDER — AMLODIPINE BESYLATE 5 MG/1
5 TABLET ORAL DAILY
Qty: 90 TABLET | Refills: 3 | Status: SHIPPED | OUTPATIENT
Start: 2020-12-24 | End: 2021-01-25

## 2020-12-24 ASSESSMENT — ANXIETY QUESTIONNAIRES
GAD7 TOTAL SCORE: 10
3. WORRYING TOO MUCH ABOUT DIFFERENT THINGS: NEARLY EVERY DAY
5. BEING SO RESTLESS THAT IT IS HARD TO SIT STILL: NOT AT ALL
6. BECOMING EASILY ANNOYED OR IRRITABLE: SEVERAL DAYS
2. NOT BEING ABLE TO STOP OR CONTROL WORRYING: NEARLY EVERY DAY
1. FEELING NERVOUS, ANXIOUS, OR ON EDGE: NEARLY EVERY DAY
7. FEELING AFRAID AS IF SOMETHING AWFUL MIGHT HAPPEN: NOT AT ALL

## 2020-12-24 ASSESSMENT — PATIENT HEALTH QUESTIONNAIRE - PHQ9
5. POOR APPETITE OR OVEREATING: NOT AT ALL
SUM OF ALL RESPONSES TO PHQ QUESTIONS 1-9: 5

## 2020-12-24 NOTE — PATIENT INSTRUCTIONS
(I10) Benign hypertension  (primary encounter diagnosis)  Comment:   Plan: amLODIPine (NORVASC) 5 MG tablet        For the BP the goal for the average is under 130/80. Use the meds and the non drug therapies.   Refills are done. The Cardiologist is adjusting the Coreg dose. If the BP is higher than the goal of being   Under 130/80, then call and the Amlodipine will be increased to 10 mg daily.     (M47.26) Osteoarthritis of spine with radiculopathy, lumbar region  Comment:   Plan: He will use the smallest amount of NSAID as possible. This may be alternated with Tylenol.   He has tried the Lidocaine patch. He is working with the Orthopedist for the cortisone shots.   We also discussed a referral to the Pain Clinic. If this is desired, call our clinic RN.

## 2020-12-24 NOTE — PROGRESS NOTES
"Dusty Ruggiero is a 75 year old male who is being evaluated via a billable telephone visit.      The patient has been notified of following:     \"This telephone visit will be conducted via a call between you and your physician/provider. We have found that certain health care needs can be provided without the need for a physical exam.  This service lets us provide the care you need with a short phone conversation.  If a prescription is necessary we can send it directly to your pharmacy.  If lab work is needed we can place an order for that and you can then stop by our lab to have the test done at a later time.    Telephone visits are billed at different rates depending on your insurance coverage. During this emergency period, for some insurers they may be billed the same as an in-person visit.  Please reach out to your insurance provider with any questions.    If during the course of the call the physician/provider feels a telephone visit is not appropriate, you will not be charged for this service.\"    Patient has given verbal consent for Telephone visit?  Yes    What phone number would you like to be contacted at? [phone]    How would you like to obtain your AVS? Mail a copy    Subjective     Dusty Ruggiero is a 75 year old male who presents via phone visit today for the following health issues:    HPI     Chief Complaint   Patient presents with     Hypertension     would like refill of amlodipine     Arthritis     Has been Aleve 1 tab four times daily for osteoarthritis, cardiologist has advised for him to discontinue this, would like to discuss other options.  Arthritis pain in all the joints.  Pain scale 8/10 currently, was shoveling today.            Hypertension Follow-up      Do you check your blood pressure regularly outside of the clinic? Yes     Are you following a low salt diet? Yes    Are your blood pressures ever more than 140 on the top number (systolic) OR more   than 90 on the bottom number " (diastolic), for example 140/90? Yes, readings over 140 systolic      How many servings of fruits and vegetables do you eat daily?  4 or more    On average, how many sweetened beverages do you drink each day (Examples: soda, juice, sweet tea, etc.  Do NOT count diet or artificially sweetened beverages)?   0    How many days per week do you exercise enough to make your heart beat faster? 7, treadmill daily    How many minutes a day do you exercise enough to make your heart beat faster? 30 - 60    How many days per week do you miss taking your medication? 0    Current Outpatient Medications   Medication Instructions     amLODIPine (NORVASC) 5 mg, Oral, DAILY     Biotin 29229 MCG TABS Oral     carvedilol (COREG) 3.125 mg, Oral, 2 TIMES DAILY WITH MEALS     Cholecalciferol (VITAMIN D3 PO) 5,000 Units, Oral, DAILY     Coenzyme Q10 (COQ-10 PO) 1 tablet, Oral, DAILY     ferrous sulfate (FEROSUL) 325 mg, Oral, DAILY     LORazepam (ATIVAN) 1 mg, Oral, 3 TIMES DAILY PRN     Naproxen Sodium (ALEVE PO) 220 mg, Oral, Takes 3 times daily      omeprazole (PRILOSEC) 20 MG DR capsule 1 CAPSULE by mouth daily as needed     pimecrolimus (ELIDEL) 1 % external cream Topical, 2 TIMES DAILY     pravastatin (PRAVACHOL) 40 mg, Oral, EVERY EVENING     psyllium (METAMUCIL) 58.6 % packet 1 packet, Oral, DAILY     sertraline (ZOLOFT) 100 MG tablet Take 50 mg in the am, and 100 mg in the pm     Zinc 30 MG TABS Oral       Patient Active Problem List   Diagnosis     Osteoarthritis     DDD (degenerative disc disease), lumbar     Degenerative arthritis of lumbar spine     Ulcerative colitis (H)     Anxiety state     Benign hypertension     Benign prostatic hyperplasia with urinary obstruction     Esophageal reflux     Hereditary and idiopathic peripheral neuropathy     Iron deficiency anemia due to chronic blood loss     Knee joint replacement by other means     Major depressive disorder with single episode, in full remission (H)     Mixed  hyperlipidemia     Palpitations     Pure hypercholesterolemia     Testicular hypofunction     Vitamin D deficiency     History of colectomy     Ventricular tachycardia (H)       (I10) Benign hypertension  (primary encounter diagnosis)  Comment:   Plan: amLODIPine (NORVASC) 5 MG tablet        For the BP the goal for the average is under 130/80. Use the meds and the non drug therapies.   Refills are done. The Cardiologist is adjusting the Coreg dose. If the BP is higher than the goal of being   Under 130/80, then call and the Amlodipine will be increased to 10 mg daily.     (M47.26) Osteoarthritis of spine with radiculopathy, lumbar region  Comment:   Plan: He will use the smallest amount of NSAID as possible. This may be alternated with Tylenol.   He has tried the Lidocaine patch. He is working with the Orthopedist for the cortisone shots.   We also discussed a referral to the Pain Clinic. If this is desired, call our clinic RN.      Sahil Kc MD      Phone call duration:  15   minutes

## 2020-12-25 ASSESSMENT — ANXIETY QUESTIONNAIRES: GAD7 TOTAL SCORE: 10

## 2020-12-29 ENCOUNTER — OFFICE VISIT (OUTPATIENT)
Dept: CARDIOLOGY | Facility: CLINIC | Age: 75
End: 2020-12-29
Payer: COMMERCIAL

## 2020-12-29 VITALS
OXYGEN SATURATION: 97 % | DIASTOLIC BLOOD PRESSURE: 83 MMHG | WEIGHT: 168.6 LBS | SYSTOLIC BLOOD PRESSURE: 150 MMHG | BODY MASS INDEX: 23.51 KG/M2 | HEART RATE: 76 BPM | TEMPERATURE: 97.5 F

## 2020-12-29 DIAGNOSIS — Z01.812 ENCOUNTER FOR PREPROCEDURE SCREENING LABORATORY TESTING FOR COVID-19: ICD-10-CM

## 2020-12-29 DIAGNOSIS — I47.29 NSVT (NONSUSTAINED VENTRICULAR TACHYCARDIA) (H): ICD-10-CM

## 2020-12-29 DIAGNOSIS — Z11.52 ENCOUNTER FOR PREPROCEDURE SCREENING LABORATORY TESTING FOR COVID-19: ICD-10-CM

## 2020-12-29 DIAGNOSIS — R94.39 ABNORMAL STRESS TEST: Primary | ICD-10-CM

## 2020-12-29 LAB
ANION GAP SERPL CALCULATED.3IONS-SCNC: 2 MMOL/L (ref 3–14)
BUN SERPL-MCNC: 15 MG/DL (ref 7–30)
CALCIUM SERPL-MCNC: 9.6 MG/DL (ref 8.5–10.1)
CHLORIDE SERPL-SCNC: 102 MMOL/L (ref 94–109)
CO2 SERPL-SCNC: 31 MMOL/L (ref 20–32)
CREAT SERPL-MCNC: 0.84 MG/DL (ref 0.66–1.25)
ERYTHROCYTE [DISTWIDTH] IN BLOOD BY AUTOMATED COUNT: 12.8 % (ref 10–15)
GFR SERPL CREATININE-BSD FRML MDRD: 85 ML/MIN/{1.73_M2}
GLUCOSE SERPL-MCNC: 107 MG/DL (ref 70–99)
HCT VFR BLD AUTO: 40.7 % (ref 40–53)
HGB BLD-MCNC: 13.3 G/DL (ref 13.3–17.7)
MCH RBC QN AUTO: 29.4 PG (ref 26.5–33)
MCHC RBC AUTO-ENTMCNC: 32.7 G/DL (ref 31.5–36.5)
MCV RBC AUTO: 90 FL (ref 78–100)
PLATELET # BLD AUTO: 224 10E9/L (ref 150–450)
POTASSIUM SERPL-SCNC: 4.1 MMOL/L (ref 3.4–5.3)
RBC # BLD AUTO: 4.52 10E12/L (ref 4.4–5.9)
SODIUM SERPL-SCNC: 135 MMOL/L (ref 133–144)
WBC # BLD AUTO: 8.7 10E9/L (ref 4–11)

## 2020-12-29 PROCEDURE — 99214 OFFICE O/P EST MOD 30 MIN: CPT | Performed by: INTERNAL MEDICINE

## 2020-12-29 PROCEDURE — 36415 COLL VENOUS BLD VENIPUNCTURE: CPT | Performed by: INTERNAL MEDICINE

## 2020-12-29 PROCEDURE — 85027 COMPLETE CBC AUTOMATED: CPT | Performed by: INTERNAL MEDICINE

## 2020-12-29 PROCEDURE — 80048 BASIC METABOLIC PNL TOTAL CA: CPT | Performed by: INTERNAL MEDICINE

## 2020-12-29 RX ORDER — LIDOCAINE 40 MG/G
CREAM TOPICAL
Status: CANCELLED | OUTPATIENT
Start: 2020-12-29

## 2020-12-29 RX ORDER — ASPIRIN 81 MG/1
81 TABLET ORAL DAILY
Status: CANCELLED | OUTPATIENT
Start: 2020-12-29 | End: 2020-12-31

## 2020-12-29 RX ORDER — SODIUM CHLORIDE 9 MG/ML
INJECTION, SOLUTION INTRAVENOUS CONTINUOUS
Status: CANCELLED | OUTPATIENT
Start: 2020-12-29

## 2020-12-29 NOTE — PATIENT INSTRUCTIONS
"Medication Changes:  1. START aspirin 81 mg daily    Recommendations:  1. Stop at lab on your way out to get blood work done  2. COVID test to be done at [facility] on Friday January 8, 2021 at 10:15am  3. Coronary angiogram to be done at [facility] on Monday January 11, 2021. Please arrive at 8:00am If you need to contact [facility] for any reason, please call [phone] option #2.    Follow-up:  See Dr Cisneros for cardiology follow up IN PERSON at [facility] on Tuesday January 19, 2021 at 9:00am    Cardiology Scheduling~[phone]  Cardiology Clinic RNs~[phone] (Gracie Mcdowell RN and Bijal Lerner RN)    ANGIOGRAM  [facility]   [place], [place]   Contact [facility] scheduling if needed at [phone].      1. In preparation for your procedure, we require that you do the following:  a. Nothing to eat or drink after midnight if your procedure is before 12 noon.  b. Take your usual morning medications with a small sip of water immediately upon arising on the morning of your procedure unless outlined below:    Aspirin:  o If you currently take Aspirin, continue taking your same dose.  2. You will be unable to drive after your procedure; please arrange to have someone drive you home. Make sure that there is a responsible adult with you for 24 hours after your procedure. Your procedure will be cancelled if you do not have transportation home or someone staying with you for 24 hrs.   3.  Your procedure will be done at [facility]. Please park in the  [address]amp  on the west side of [facility] on [address]. Take the skyway over [facility] to the hospital. Please check in on the first floor, \"Skyway Welcome Desk\" which is one floor down from the skyway level.   4. If you have any questions about your upcoming procedure, please contact nursing at [facility] at [phone] or at [facility] at " [phone].

## 2020-12-29 NOTE — LETTER
12/29/2020    Sahil Kc MD  [address] [facility] [address]    RE: Dusty Floresmichelle       Dear Colleague,    I had the pleasure of seeing Dustymakeda Floresmichelle in the [facility].    CARDIOLOGY VISIT    REASON FOR VISIT: Abnormal stress test    SUBJECTIVE:  75-year-old male with no cardiac history is seen for abnormal stress test.  He has hypertension and ulcerative colitis.    He recently underwent evaluation for dizziness.  ZIO monitor August 2020 through [facility] showed sinus rhythm, 12 beat run of VT, a few SVT runs up to 13 seconds.  Carvedilol was started with plans to repeat a monitor.    Echo December 2020 showed EF 60%, normal wall motion, normal RV, no valve disease.  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    He has a variety of arthritic related complaints.  Mainly he has low back pain which will limit his walking somewhat.  He is able to walk on a treadmill for 30 minutes at 1.6 mph with a slight incline with no angina.  He complains of some generalized fatigue.  Blood pressure has been running 130-145 with heart rate of 70-80.  He takes 4 Aleve per day for his arthritis.  He denies any bleeding history.    MEDICATIONS:  Current Outpatient Medications   Medication     amLODIPine (NORVASC) 5 MG tablet     Biotin 56910 MCG TABS     carvedilol (COREG) 3.125 MG tablet     Cholecalciferol (VITAMIN D3 PO)     Coenzyme Q10 (COQ-10 PO)     ferrous sulfate 325 (65 FE) MG tablet     LORazepam (ATIVAN) 1 MG tablet     Naproxen Sodium (ALEVE PO)     omeprazole (PRILOSEC) 20 MG DR capsule     pimecrolimus (ELIDEL) 1 % external cream     pravastatin (PRAVACHOL) 40 MG tablet     psyllium (METAMUCIL) 58.6 % packet     sertraline (ZOLOFT) 100 MG tablet     Zinc 30 MG TABS     No current facility-administered medications for this visit.        ALLERGIES:  Allergies   Allergen  Reactions     Atenolol Shortness Of Breath     Other reaction(s): Unknown  Other reaction(s): Hard to breath     Atenolol Difficulty breathing     Rapid heart beat     Atorvastatin Muscle Pain (Myalgia)     Other reaction(s): Myalgias     Buspirone Difficulty breathing and Other (See Comments)     Other reaction(s): Unknown  Rapid heart beat     Gabapentin Other (See Comments)     Other reaction(s): Dizzy  Dizzy       Lyrica [Pregabalin] Other (See Comments)     Other reaction(s): dizziness  Dizzy       Benadryl [Diphenhydramine] Anxiety     Other reaction(s): excitablity     Vistaril [Hydroxyzine] Anxiety     Other reaction(s): dizzy,HA, nausea         REVIEW OF SYSTEMS:  Constitutional:  No weight loss, fever, chills, weakness or fatigue.  HEENT:  Eyes:  No visual loss, blurred vision, double vision or yellow sclerae. No hearing loss, sneezing, congestion, runny nose or sore throat.  Skin:  No rash or itching.  Cardiovascular: per HPI  Respiratory: per HPI  GI:  No anorexia, nausea, vomiting or diarrhea. No abdominal pain or blood.  :  No dysurea, hematuria  Neurologic:  No headache, dizziness, syncope, paralysis, ataxia, numbness or tingling in the extremities. No change in bowel or bladder control.  Musculoskeletal: Per HPI  Hematologic:  No anemia, bleeding or bruising.  Lymphatics:  No enlarged nodes. No history of splenectomy.  Psychiatric:  No history of depression or anxiety.  Endocrine:  No reports of sweating, cold or heat intolerance. No polyuria or polydipsia.  Allergies:  No history of asthma, hives, eczema or rhinitis.    PHYSICAL EXAM:  BP (!) 150/83 (BP Location: Right arm, Patient Position: Sitting, Cuff Size: Adult Regular)   Pulse 76   Temp 97.5  F (36.4  C) (Tympanic)   Wt 76.5 kg (168 lb 9.6 oz)   SpO2 97%   BMI 23.51 kg/m      Constitutional: awake, alert, no distress  Eyes: PERRL, sclera nonicteric  ENT: trachea midline  Respiratory: Lungs clear  Cardiovascular: Regular rate and  rhythm, no murmurs  GI: nondistended, nontender, bowel sounds present  Lymph/Hematologic: no lymphadenopathy  Skin: dry, no rash  Musculoskeletal: good muscle tone, strength 5/5 in upper and lower extremities  Neurologic: no focal deficits  Neuropsychiatric: appropriate affact    DATA:  Lab: December 29, 2020: Potassium 4.1, creatinine 0.8, hemoglobin 13    ASSESSMENT:  75-year-old male seen for abnormal stress test.  He has some generalized fatigue and tiredness, but no typical angina.  Stress test showed an infero- and inferoseptal defect with partial reversibility.  It was not the best quality scan.  There is an intermediate probability of obstructive coronary disease based on his symptoms and the scan.    Dizziness is probably not cardiac.  Blood pressure runs normal to a little high.  Repeat ZIO monitor on carvedilol is currently pending.    A coronary angiogram was recommended.  The risk and benefit of the procedure was explained.  He understands and wishes to proceed.  He will take low-dose aspirin.  He was encouraged to cut back on his daily Aleve to reduce the bleeding risk.    RECOMMENDATIONS:  1.  Abnormal stress test  -Coronary angiogram  - Start aspirin 81 mg daily    2.  SVT and single run of VT on previous ZIO monitor  -Currently on carvedilol, repeat ZIO monitor pending    3.  Hypertension  -  Borderline controlled, continue current medications    Follow-up 2 weeks after angiogram.    Timi Napier MD  Cardiology - [facility]  Pager:  [phone]  Text Page  December 29, 2020        Thank you for allowing me to participate in the care of your patient.    Sincerely,     Timi Napier MD     [facility]

## 2020-12-29 NOTE — PROGRESS NOTES
CARDIOLOGY VISIT    REASON FOR VISIT: Abnormal stress test    SUBJECTIVE:  75-year-old male with no cardiac history is seen for abnormal stress test.  He has hypertension and ulcerative colitis.    He recently underwent evaluation for dizziness.  ZIO monitor August 2020 through [facility] showed sinus rhythm, 12 beat run of VT, a few SVT runs up to 13 seconds.  Carvedilol was started with plans to repeat a monitor.    Echo December 2020 showed EF 60%, normal wall motion, normal RV, no valve disease.  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    He has a variety of arthritic related complaints.  Mainly he has low back pain which will limit his walking somewhat.  He is able to walk on a treadmill for 30 minutes at 1.6 mph with a slight incline with no angina.  He complains of some generalized fatigue.  Blood pressure has been running 130-145 with heart rate of 70-80.  He takes 4 Aleve per day for his arthritis.  He denies any bleeding history.    MEDICATIONS:  Current Outpatient Medications   Medication     amLODIPine (NORVASC) 5 MG tablet     Biotin 08097 MCG TABS     carvedilol (COREG) 3.125 MG tablet     Cholecalciferol (VITAMIN D3 PO)     Coenzyme Q10 (COQ-10 PO)     ferrous sulfate 325 (65 FE) MG tablet     LORazepam (ATIVAN) 1 MG tablet     Naproxen Sodium (ALEVE PO)     omeprazole (PRILOSEC) 20 MG DR capsule     pimecrolimus (ELIDEL) 1 % external cream     pravastatin (PRAVACHOL) 40 MG tablet     psyllium (METAMUCIL) 58.6 % packet     sertraline (ZOLOFT) 100 MG tablet     Zinc 30 MG TABS     No current facility-administered medications for this visit.        ALLERGIES:  Allergies   Allergen Reactions     Atenolol Shortness Of Breath     Other reaction(s): Unknown  Other reaction(s): Hard to breath     Atenolol Difficulty breathing     Rapid heart beat     Atorvastatin Muscle Pain (Myalgia)     Other reaction(s):  Myalgias     Buspirone Difficulty breathing and Other (See Comments)     Other reaction(s): Unknown  Rapid heart beat     Gabapentin Other (See Comments)     Other reaction(s): Dizzy  Dizzy       Lyrica [Pregabalin] Other (See Comments)     Other reaction(s): dizziness  Dizzy       Benadryl [Diphenhydramine] Anxiety     Other reaction(s): excitablity     Vistaril [Hydroxyzine] Anxiety     Other reaction(s): dizzy,HA, nausea         REVIEW OF SYSTEMS:  Constitutional:  No weight loss, fever, chills, weakness or fatigue.  HEENT:  Eyes:  No visual loss, blurred vision, double vision or yellow sclerae. No hearing loss, sneezing, congestion, runny nose or sore throat.  Skin:  No rash or itching.  Cardiovascular: per HPI  Respiratory: per HPI  GI:  No anorexia, nausea, vomiting or diarrhea. No abdominal pain or blood.  :  No dysurea, hematuria  Neurologic:  No headache, dizziness, syncope, paralysis, ataxia, numbness or tingling in the extremities. No change in bowel or bladder control.  Musculoskeletal: Per HPI  Hematologic:  No anemia, bleeding or bruising.  Lymphatics:  No enlarged nodes. No history of splenectomy.  Psychiatric:  No history of depression or anxiety.  Endocrine:  No reports of sweating, cold or heat intolerance. No polyuria or polydipsia.  Allergies:  No history of asthma, hives, eczema or rhinitis.    PHYSICAL EXAM:  BP (!) 150/83 (BP Location: Right arm, Patient Position: Sitting, Cuff Size: Adult Regular)   Pulse 76   Temp 97.5  F (36.4  C) (Tympanic)   Wt 76.5 kg (168 lb 9.6 oz)   SpO2 97%   BMI 23.51 kg/m      Constitutional: awake, alert, no distress  Eyes: PERRL, sclera nonicteric  ENT: trachea midline  Respiratory: Lungs clear  Cardiovascular: Regular rate and rhythm, no murmurs  GI: nondistended, nontender, bowel sounds present  Lymph/Hematologic: no lymphadenopathy  Skin: dry, no rash  Musculoskeletal: good muscle tone, strength 5/5 in upper and lower extremities  Neurologic: no focal  deficits  Neuropsychiatric: appropriate affact    DATA:  Lab: December 29, 2020: Potassium 4.1, creatinine 0.8, hemoglobin 13    ASSESSMENT:  75-year-old male seen for abnormal stress test.  He has some generalized fatigue and tiredness, but no typical angina.  Stress test showed an infero- and inferoseptal defect with partial reversibility.  It was not the best quality scan.  There is an intermediate probability of obstructive coronary disease based on his symptoms and the scan.    Dizziness is probably not cardiac.  Blood pressure runs normal to a little high.  Repeat ZIO monitor on carvedilol is currently pending.    A coronary angiogram was recommended.  The risk and benefit of the procedure was explained.  He understands and wishes to proceed.  He will take low-dose aspirin.  He was encouraged to cut back on his daily Aleve to reduce the bleeding risk.    RECOMMENDATIONS:  1.  Abnormal stress test  -Coronary angiogram  - Start aspirin 81 mg daily    2.  SVT and single run of VT on previous ZIO monitor  -Currently on carvedilol, repeat ZIO monitor pending    3.  Hypertension  -  Borderline controlled, continue current medications    Follow-up 2 weeks after angiogram.    Timi Napier MD  Cardiology - [facility]  Pager:  [phone]  Text Page  December 29, 2020

## 2021-01-06 ENCOUNTER — TELEPHONE (OUTPATIENT)
Dept: CARDIOLOGY | Facility: CLINIC | Age: 76
End: 2021-01-06

## 2021-01-08 ENCOUNTER — DOCUMENTATION ONLY (OUTPATIENT)
Dept: CARDIOLOGY | Facility: CLINIC | Age: 76
End: 2021-01-08

## 2021-01-08 DIAGNOSIS — Z01.812 ENCOUNTER FOR PREPROCEDURE SCREENING LABORATORY TESTING FOR COVID-19: ICD-10-CM

## 2021-01-08 DIAGNOSIS — Z11.52 ENCOUNTER FOR PREPROCEDURE SCREENING LABORATORY TESTING FOR COVID-19: ICD-10-CM

## 2021-01-08 LAB
SARS-COV-2 RNA RESP QL NAA+PROBE: NORMAL
SPECIMEN SOURCE: NORMAL

## 2021-01-08 PROCEDURE — U0003 INFECTIOUS AGENT DETECTION BY NUCLEIC ACID (DNA OR RNA); SEVERE ACUTE RESPIRATORY SYNDROME CORONAVIRUS 2 (SARS-COV-2) (CORONAVIRUS DISEASE [COVID-19]), AMPLIFIED PROBE TECHNIQUE, MAKING USE OF HIGH THROUGHPUT TECHNOLOGIES AS DESCRIBED BY CMS-2020-01-R: HCPCS | Performed by: INTERNAL MEDICINE

## 2021-01-08 PROCEDURE — U0005 INFEC AGEN DETEC AMPLI PROBE: HCPCS | Performed by: INTERNAL MEDICINE

## 2021-01-08 NOTE — PROGRESS NOTES
Received fax from [employer] - Confirmation Number for Angio scheduling    Pt is scheduled for angio 1/11/21    [employer] Number [id number]    Sent to scanning.  AMIRAH CHEUNG RN on 1/8/2021 at 11:00 AM

## 2021-01-09 LAB
LABORATORY COMMENT REPORT: NORMAL
SARS-COV-2 RNA RESP QL NAA+PROBE: NEGATIVE
SPECIMEN SOURCE: NORMAL

## 2021-01-11 ENCOUNTER — HOSPITAL ENCOUNTER (OUTPATIENT)
Facility: CLINIC | Age: 76
Discharge: HOME | End: 2021-01-11
Admitting: INTERNAL MEDICINE
Payer: COMMERCIAL

## 2021-01-11 VITALS
BODY MASS INDEX: 23.21 KG/M2 | DIASTOLIC BLOOD PRESSURE: 64 MMHG | RESPIRATION RATE: 16 BRPM | SYSTOLIC BLOOD PRESSURE: 151 MMHG | OXYGEN SATURATION: 97 % | TEMPERATURE: 97.5 F | HEART RATE: 72 BPM | WEIGHT: 166.45 LBS

## 2021-01-11 DIAGNOSIS — R94.39 ABNORMAL STRESS TEST: ICD-10-CM

## 2021-01-11 DIAGNOSIS — I25.10 ATHEROSCLEROSIS OF NATIVE CORONARY ARTERY OF NATIVE HEART WITHOUT ANGINA PECTORIS: ICD-10-CM

## 2021-01-11 DIAGNOSIS — I47.29 NSVT (NONSUSTAINED VENTRICULAR TACHYCARDIA) (H): ICD-10-CM

## 2021-01-11 PROBLEM — Z98.890 STATUS POST CORONARY ANGIOGRAM: Status: ACTIVE | Noted: 2021-01-11

## 2021-01-11 LAB
ANION GAP SERPL CALCULATED.3IONS-SCNC: 6 MMOL/L (ref 3–14)
APTT PPP: 32 SEC (ref 22–37)
BUN SERPL-MCNC: 14 MG/DL (ref 7–30)
CALCIUM SERPL-MCNC: 9.5 MG/DL (ref 8.5–10.1)
CHLORIDE SERPL-SCNC: 103 MMOL/L (ref 94–109)
CO2 SERPL-SCNC: 27 MMOL/L (ref 20–32)
CREAT SERPL-MCNC: 0.85 MG/DL (ref 0.66–1.25)
ERYTHROCYTE [DISTWIDTH] IN BLOOD BY AUTOMATED COUNT: 13.2 % (ref 10–15)
GFR SERPL CREATININE-BSD FRML MDRD: 85 ML/MIN/{1.73_M2}
GLUCOSE SERPL-MCNC: 106 MG/DL (ref 70–99)
HCT VFR BLD AUTO: 41.8 % (ref 40–53)
HGB BLD-MCNC: 13.5 G/DL (ref 13.3–17.7)
INR PPP: 1.11 (ref 0.86–1.14)
MCH RBC QN AUTO: 28.8 PG (ref 26.5–33)
MCHC RBC AUTO-ENTMCNC: 32.3 G/DL (ref 31.5–36.5)
MCV RBC AUTO: 89 FL (ref 78–100)
PLATELET # BLD AUTO: 216 10E9/L (ref 150–450)
POTASSIUM SERPL-SCNC: 3.6 MMOL/L (ref 3.4–5.3)
RBC # BLD AUTO: 4.68 10E12/L (ref 4.4–5.9)
SODIUM SERPL-SCNC: 136 MMOL/L (ref 133–144)
WBC # BLD AUTO: 10.1 10E9/L (ref 4–11)

## 2021-01-11 PROCEDURE — 250N000009 HC RX 250: Performed by: INTERNAL MEDICINE

## 2021-01-11 PROCEDURE — 93454 CORONARY ARTERY ANGIO S&I: CPT | Mod: 26 | Performed by: INTERNAL MEDICINE

## 2021-01-11 PROCEDURE — 93010 ELECTROCARDIOGRAM REPORT: CPT | Performed by: INTERNAL MEDICINE

## 2021-01-11 PROCEDURE — C1887 CATHETER, GUIDING: HCPCS | Performed by: INTERNAL MEDICINE

## 2021-01-11 PROCEDURE — 999N000071 HC STATISTIC HEART CATH LAB OR EP LAB

## 2021-01-11 PROCEDURE — 85610 PROTHROMBIN TIME: CPT | Performed by: INTERNAL MEDICINE

## 2021-01-11 PROCEDURE — 85730 THROMBOPLASTIN TIME PARTIAL: CPT | Mod: GZ | Performed by: INTERNAL MEDICINE

## 2021-01-11 PROCEDURE — 99152 MOD SED SAME PHYS/QHP 5/>YRS: CPT | Mod: 59 | Performed by: INTERNAL MEDICINE

## 2021-01-11 PROCEDURE — 80048 BASIC METABOLIC PNL TOTAL CA: CPT | Performed by: INTERNAL MEDICINE

## 2021-01-11 PROCEDURE — 999N000184 HC STATISTIC TELEMETRY

## 2021-01-11 PROCEDURE — 99152 MOD SED SAME PHYS/QHP 5/>YRS: CPT | Performed by: INTERNAL MEDICINE

## 2021-01-11 PROCEDURE — 258N000003 HC RX IP 258 OP 636: Performed by: INTERNAL MEDICINE

## 2021-01-11 PROCEDURE — 272N000001 HC OR GENERAL SUPPLY STERILE: Performed by: INTERNAL MEDICINE

## 2021-01-11 PROCEDURE — 999N000054 HC STATISTIC EKG NON-CHARGEABLE

## 2021-01-11 PROCEDURE — 36415 COLL VENOUS BLD VENIPUNCTURE: CPT

## 2021-01-11 PROCEDURE — 250N000011 HC RX IP 250 OP 636: Performed by: INTERNAL MEDICINE

## 2021-01-11 PROCEDURE — C1769 GUIDE WIRE: HCPCS | Performed by: INTERNAL MEDICINE

## 2021-01-11 PROCEDURE — 85027 COMPLETE CBC AUTOMATED: CPT | Performed by: INTERNAL MEDICINE

## 2021-01-11 PROCEDURE — C1894 INTRO/SHEATH, NON-LASER: HCPCS | Performed by: INTERNAL MEDICINE

## 2021-01-11 PROCEDURE — 93005 ELECTROCARDIOGRAM TRACING: CPT

## 2021-01-11 PROCEDURE — 93454 CORONARY ARTERY ANGIO S&I: CPT | Performed by: INTERNAL MEDICINE

## 2021-01-11 RX ORDER — FLUMAZENIL 0.1 MG/ML
0.2 INJECTION, SOLUTION INTRAVENOUS
Status: DISCONTINUED | OUTPATIENT
Start: 2021-01-11 | End: 2021-01-11 | Stop reason: HOSPADM

## 2021-01-11 RX ORDER — SODIUM CHLORIDE 9 MG/ML
INJECTION, SOLUTION INTRAVENOUS CONTINUOUS
Status: DISCONTINUED | OUTPATIENT
Start: 2021-01-11 | End: 2021-01-11 | Stop reason: HOSPADM

## 2021-01-11 RX ORDER — NALOXONE HYDROCHLORIDE 0.4 MG/ML
0.4 INJECTION, SOLUTION INTRAMUSCULAR; INTRAVENOUS; SUBCUTANEOUS
Status: DISCONTINUED | OUTPATIENT
Start: 2021-01-11 | End: 2021-01-11 | Stop reason: HOSPADM

## 2021-01-11 RX ORDER — HEPARIN SODIUM 1000 [USP'U]/ML
INJECTION, SOLUTION INTRAVENOUS; SUBCUTANEOUS
Status: DISCONTINUED | OUTPATIENT
Start: 2021-01-11 | End: 2021-01-11 | Stop reason: HOSPADM

## 2021-01-11 RX ORDER — LIDOCAINE 40 MG/G
CREAM TOPICAL
Status: DISCONTINUED | OUTPATIENT
Start: 2021-01-11 | End: 2021-01-11 | Stop reason: HOSPADM

## 2021-01-11 RX ORDER — NALOXONE HYDROCHLORIDE 0.4 MG/ML
0.2 INJECTION, SOLUTION INTRAMUSCULAR; INTRAVENOUS; SUBCUTANEOUS
Status: DISCONTINUED | OUTPATIENT
Start: 2021-01-11 | End: 2021-01-11 | Stop reason: HOSPADM

## 2021-01-11 RX ORDER — FENTANYL CITRATE 50 UG/ML
INJECTION, SOLUTION INTRAMUSCULAR; INTRAVENOUS
Status: DISCONTINUED | OUTPATIENT
Start: 2021-01-11 | End: 2021-01-11 | Stop reason: HOSPADM

## 2021-01-11 RX ORDER — ASPIRIN 81 MG/1
81 TABLET ORAL DAILY
Status: DISCONTINUED | OUTPATIENT
Start: 2021-01-11 | End: 2021-01-11 | Stop reason: HOSPADM

## 2021-01-11 RX ORDER — ATROPINE SULFATE 0.1 MG/ML
0.5 INJECTION INTRAVENOUS
Status: DISCONTINUED | OUTPATIENT
Start: 2021-01-11 | End: 2021-01-11 | Stop reason: HOSPADM

## 2021-01-11 RX ORDER — VERAPAMIL HYDROCHLORIDE 2.5 MG/ML
INJECTION, SOLUTION INTRAVENOUS
Status: DISCONTINUED | OUTPATIENT
Start: 2021-01-11 | End: 2021-01-11 | Stop reason: HOSPADM

## 2021-01-11 RX ORDER — NITROGLYCERIN 5 MG/ML
VIAL (ML) INTRAVENOUS
Status: DISCONTINUED | OUTPATIENT
Start: 2021-01-11 | End: 2021-01-11 | Stop reason: HOSPADM

## 2021-01-11 RX ORDER — ACETAMINOPHEN 325 MG/1
650 TABLET ORAL EVERY 4 HOURS PRN
Status: DISCONTINUED | OUTPATIENT
Start: 2021-01-11 | End: 2021-01-11 | Stop reason: HOSPADM

## 2021-01-11 RX ORDER — FENTANYL CITRATE 50 UG/ML
25-50 INJECTION, SOLUTION INTRAMUSCULAR; INTRAVENOUS
Status: ACTIVE | OUTPATIENT
Start: 2021-01-11 | End: 2021-01-11

## 2021-01-11 RX ADMIN — SODIUM CHLORIDE: 9 INJECTION, SOLUTION INTRAVENOUS at 07:52

## 2021-01-11 NOTE — DISCHARGE INSTRUCTIONS
Cardiac Angiogram Discharge Instructions - Radial    After you go home:      Have an adult stay with you until tomorrow.    Drink extra fluids for 2 days.    You may resume your normal diet.    No smoking       For 24 hours - due to the sedation you received:    Relax and take it easy.    Do NOT make any important or legal decisions.    Do NOT drive or operate machines at home or at work.    Do NOT drink alcohol.    Care of Wrist Puncture Site:      For the first 24 hrs - check the puncture site every 1-2 hours while awake.    It is normal to have soreness at the puncture site and mild tingling in your hand for up to 3 days.    Remove the bandaid after 24 hours. If there is minor oozing, apply another bandaid and remove it after 12 hours.    You may shower tomorrow.  Do NOT take a bath, or use a hot tub or pool for at least 3 days. Do NOT scrub the site. Do not use lotion or powder near the puncture site.           Activity:        For 2 days:     do not use your hand or arm to support your weight (such as rising from a chair)     do not bend your wrist (such as lifting a garage door).    do not lift more than 5 pounds or exercise your arm (such as tennis, golf or bowling).    Do NOT do any heavy activity such as exercise, lifting, or straining.     Bleeding:      If you start bleeding from the site in your wrist, sit down and press firmly on/above the site for 10 minutes.     Once bleeding stops, keep arm still for 2 hours.     Call [facility] as soon as you can.       Call 911 right away if you have heavy bleeding or bleeding that does not stop.      Medicines:      Take your medications, including blood thinners, unless your provider tells you not to.          If you have stopped any medicines, check with your provider about when to restart them.    Follow Up Appointments:      Follow up with [facility] Nurse Practitioner at [facility] of patient preference in 7-10 days.    Call the clinic if:      You have  a large or growing hard lump around the site.    The site is red, swollen, hot or tender.    Blood or fluid is draining from the site.    You have chills or a fever greater than 101 F (38 C).    Your arm feels numb, cool or changes color.    You have hives, a rash or unusual itching.    Any questions or concerns.          [facility] [place]:    [phone] UMP (7 days a week)

## 2021-01-11 NOTE — PROGRESS NOTES
TR band removed according to protocol.  Band aid to site.    Up in hallway to ambulate.  Reports no diff voiding.  Tolerated lunch tray, VSS at baseline.    Site stable, will cont to monitor.  AVS reviewed with pt, states understanding.

## 2021-01-11 NOTE — PROVIDER NOTIFICATION
Dr. Camara saw patient at bedside. Discussed findings of angiogram and plan for possible starting a betablocker when seen in clinic next week. Shown rhythm strip of sinus dysrhymia and upper (R) forearm soft swelling which Dr. Camara felt this was muscle.

## 2021-01-11 NOTE — PRE-PROCEDURE
GENERAL PRE-PROCEDURE:   Procedure:  CAG possible PCIO  Date/Time:  1/11/2021 10:32 AM    Verbal consent obtained?: Yes    Written consent obtained?: Yes    Risks and benefits: Risks, benefits and alternatives were discussed    Consent given by:  Patient  Patient states understanding of procedure being performed: Yes    Patient's understanding of procedure matches consent: Yes    Procedure consent matches procedure scheduled: Yes    Expected level of sedation:  Moderate  Appropriately NPO:  Yes  ASA Class:  Class 2- mild systemic disease, no acute problems, no functional limitations  Mallampati  :  Grade 1- soft palate, uvula, tonsillar pillars, and posterior pharyngeal wall visible  Lungs:  Lungs clear with good breath sounds bilaterally  Heart:  Normal heart sounds and rate  History & Physical reviewed:  History and physical reviewed and no updates needed  I have examined the patient, reviewed the history, medications and pre procedural tests. No angina but NSVT and an abnormal nuclear stress test.  I have explained to the patient the risks of death, MI, stroke, hematoma, possible urgent bypass surgery for failed PCI, use of stents, thienopyridine agents, possible peripheral vascular complications, arrhythmia, the use of FFR in clinical decision-making and alternative of medical therapy alone in regards to left heart catheterization, left ventriculography, coronary angiography, and possible percutaneous coronary intervention. The patient voiced understanding and wishes to proceed. The patient has a good right radial pulse, normal ulnar pulse and a normal Billy's sign.

## 2021-01-11 NOTE — PROGRESS NOTES
Care Suites Post Procedure Note    Patient Information  Name: Dusty Ruggiero  Age: 75 year old    Post Procedure  Time patient returned to Care Suites: 1035  Concerns/abnormal assessment: Right forearm puffy and firm( just below AC and proximal to TR band) when pt has arm extended with forearm up, but becomes soft and flat when forearm is turned toward body.  No pain, no new swelling since arriving to dept.  Hand warm, denies any N&T, pulse easy to feel.  If abnormal assessment, provider notified: Yes- MD text page to update, no return call at this time  Plan/Other: monitor    Hannah Doss RN

## 2021-01-11 NOTE — PROCEDURES
[facility]    Procedure: *Cath without PCI    Date/Time: 1/11/2021 10:37 AM  Performed by: Mike Camara MD  Authorized by: Mike Camara MD     UNIVERSAL PROTOCOL   Site Marked: Yes  Prior Images Obtained and Reviewed:  Yes  Required items: Required blood products, implants, devices and special equipment available    Patient identity confirmed:  Verbally with patient  Patient was reevaluated immediately before administering moderate or deep sedation or anesthesia  Confirmation Checklist:  Patient's identity using two indicators  Time out: Immediately prior to the procedure a time out was called    Universal Protocol: the Joint Commission Universal Protocol was followed    Preparation: Patient was prepped and draped in usual sterile fashion           ANESTHESIA    Local Anesthetic: Lidocaine 1% without epinephrine      SEDATION    Patient Sedated: Yes    Vital signs: Vital signs monitored during sedation    PROCEDURE   Patient Tolerance:  Patient tolerated the procedure well with no immediate complications     Procedure  1) CAG  Approach RTR  Complications none  Findings    Multiple PACs  Distal 60 to 70% lesion M2 otherwise no signficant  Stenosis    Assess. Given the absence of angina, preserved  LVEF and lack of  Correlation with nuclear  Stress test, continued medical treatment appears  Best choice.    Plan  Statin beta blocker blood pressure  Treatment.    Hoa  Length of time physician/provider present for 1:1 monitoring during sedation: 15

## 2021-01-11 NOTE — PROGRESS NOTES
Care Suites Admission Nursing Note    Patient Information  Name: Dusty Ruggiero  Age: 75 year old  Reason for admission: Heart Cath  Care Suites arrival time: 0710      Patient Admission/Assessment   Pre-procedure assessment complete: Yes  If abnormal assessment/labs, provider notified: N/A  NPO: Yes  Medications held per instructions/orders: N/A  Consent: deferred  If applicable, pregnancy test status: deferred  Patient oriented to room: Yes  Education/questions answered: Yes  Plan/other: Assessment complete.    0900- bilat groin and radial sites clipped and prepped with milena wipes    Discharge Planning  Discharge name/phone number: Rupinder [phone]  Overnight post sedation caregiver: Rupinder  Discharge location: home    Hannah Doss RN     PATIENT/VISITOR WELLNESS SCREENING    Step 1 Patient Screening    1. In the last month, have you been in contact with someone who was confirmed or suspected to have Coronavirus/COVID-19? No    2. Do you have the following symptoms?  Fever/Chills? No   Cough? No   Shortness of breath? No   New loss of taste or smell? No  Sore throat? No  Muscle or body aches? No  Headaches? No  Fatigue? No  Vomiting or diarrhea? No      Step 3 Refer to logic grid below for actions    NO SYMPTOM(S)    ACTIONS:  1. Standard rooming process  2. Provider to assess per normal protocol  3. Implement precautions as needed and per guidelines     POSITIVE SYMPTOM(S)  If positive for ANY of the following symptoms: fever, cough, shortness of breath, rash    ACTION:  1. Continue to have the patient wear a mask   2. Room patient as soon as possible  3. Don appropriate PPE when entering room  4. Provider evaluation

## 2021-01-11 NOTE — RESULT ENCOUNTER NOTE
70% lesion in M2 which didn't correlate with stress test results so no intervention done. Follow up with Dr Cisneros on 1/19/21

## 2021-01-11 NOTE — PRE-PROCEDURE
GENERAL PRE-PROCEDURE:   Procedure:  Coronary angiogram and possible intervention  Date/Time:  1/11/2021 8:10 AM    Written consent obtained?: Yes    Risks and benefits: Risks, benefits and alternatives were discussed    DC Plan: Appropriate discharge home plan in place for patients who are going home after procedure   Consent given by:  Patient  Patient states understanding of procedure being performed: Yes    Patient's understanding of procedure matches consent: Yes    Procedure consent matches procedure scheduled: Yes    Expected level of sedation:  Moderate  Appropriately NPO:  Yes  ASA Class:  Class 2- mild systemic disease, no acute problems, no functional limitations  Mallampati  :  Grade 2- soft palate, base of uvula, tonsillar pillars, and portion of posterior pharyngeal wall visible  Lungs:  Lungs clear with good breath sounds bilaterally  Heart:  Normal heart sounds and rate  History & Physical reviewed:  History and physical reviewed and no updates needed  Statement of review:  I have reviewed the lab findings, diagnostic data, medications, and the plan for sedation    No known CAD  Palpitations/Holter with NSVT  Abnormal stress test  Agreeable to DAPT if indicated, no bleeding issues  No prior issues with sedation

## 2021-01-11 NOTE — PROGRESS NOTES
Care Suites Discharge Nursing Note    Patient Information  Name: Dusty Ruggiero  Age: 75 year old    Discharge Education:  Discharge instructions reviewed: Yes  Additional education/resources provided: none  Patient/patient representative verbalizes understanding: Yes  Patient discharging on new medications: No  Medication education completed: N/A    Discharge Plans:   Discharge location: home  Discharge ride contacted: Yes  Approximate discharge time: 1415    Discharge Criteria:  Discharge criteria met and vital signs stable: Yes    Patient Belongs:  Patient belongings returned to patient: N/A    Hannah Doss RN

## 2021-01-12 ENCOUNTER — TELEPHONE (OUTPATIENT)
Dept: CARDIOLOGY | Facility: CLINIC | Age: 76
End: 2021-01-12

## 2021-01-12 DIAGNOSIS — E78.2 MIXED HYPERLIPIDEMIA: Primary | ICD-10-CM

## 2021-01-12 DIAGNOSIS — I25.10 CAD (CORONARY ARTERY DISEASE): ICD-10-CM

## 2021-01-12 DIAGNOSIS — R94.39 ABNORMAL STRESS TEST: ICD-10-CM

## 2021-01-12 NOTE — TELEPHONE ENCOUNTER
ADDENDUM: Orders placed. Already scheduled with Dr Cisneros for 1/19/21--no APPs available. Pt notified of lab orders. Scheduled for Friday 1/15/21 at 10am. Bijal Lerner RN Cardiology January 12, 2021, 9:17 AM      ----- Message from Giorgio Cisneros MD sent at 1/12/2021  8:44 AM CST -----  Regarding: RE: CAG  Thank you Gerson. Thanks for seeing him Re [place], can we please have him follow up with an SANTI in 1-2 weeks. Get lipids checked. Stay on aspirin and statin.     Thank you.    Giorgio  ----- Message -----  From: Mike Camara MD  Sent: 1/11/2021  12:13 PM CST  To: Timi Napier MD, #  Subject: RE: CAG                                          Omar Alvares  I think you have also been involved in his care, so I wanted to CC you. Kranthi Nieto  ----- Message -----  From: Timi Napier MD  Sent: 1/11/2021  11:25 AM CST  To: Mike Camara MD  Subject: RE: CAG                                          Thanks for the update.  Luke    ----- Message -----  From: Mike Camara MD  Sent: 1/11/2021  11:03 AM CST  To: Timi Napier MD  Subject: CAG                                              Mr. Ruggiero underwent CAG via  RTR to evaluate a history of NSVT in the setting of hypertension, dyslipidemia. 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 He  is entirely free  of angina, but has suffered  from dizziness. He has  episodic lightheadedness with no history of syncope.    His coronary angiogram shows atheromatous change with no focal stenosis in the LMCA, LAD and dominant RCA. There was an isolated  70% lesion in the large M2 which supplies the lateral wall. I noted  multiple PACs with nonsustained SVT during the case    Given the absence of angina and the unlikelihood that PCI would help his lightheadedness, we did not perform further testing or intervention on this lesion.     We have  advised continued medical therapy, perhaps  with the addition of a beta  blocker to his regimen. Follow up was arranged with an SANTI    Luke, thank you for allowing me to participate in his care!!    Sincerely       Gerson Camara

## 2021-01-13 LAB — INTERPRETATION ECG - MUSE: NORMAL

## 2021-01-14 DIAGNOSIS — Z85.46 PERSONAL HISTORY OF MALIGNANT NEOPLASM OF PROSTATE: Primary | ICD-10-CM

## 2021-01-15 DIAGNOSIS — R94.39 ABNORMAL STRESS TEST: ICD-10-CM

## 2021-01-15 DIAGNOSIS — E78.2 MIXED HYPERLIPIDEMIA: ICD-10-CM

## 2021-01-15 DIAGNOSIS — I25.10 CAD (CORONARY ARTERY DISEASE): ICD-10-CM

## 2021-01-15 DIAGNOSIS — Z85.46 PERSONAL HISTORY OF MALIGNANT NEOPLASM OF PROSTATE: ICD-10-CM

## 2021-01-15 LAB
ALT SERPL W P-5'-P-CCNC: 17 U/L (ref 0–70)
CHOLEST SERPL-MCNC: 189 MG/DL
HDLC SERPL-MCNC: 79 MG/DL
LDLC SERPL CALC-MCNC: 85 MG/DL
NONHDLC SERPL-MCNC: 110 MG/DL
TRIGL SERPL-MCNC: 123 MG/DL

## 2021-01-15 PROCEDURE — 84153 ASSAY OF PSA TOTAL: CPT | Mod: 90 | Performed by: UROLOGY

## 2021-01-15 PROCEDURE — 36415 COLL VENOUS BLD VENIPUNCTURE: CPT | Performed by: UROLOGY

## 2021-01-15 PROCEDURE — 99000 SPECIMEN HANDLING OFFICE-LAB: CPT | Performed by: UROLOGY

## 2021-01-15 PROCEDURE — 84460 ALANINE AMINO (ALT) (SGPT): CPT | Performed by: INTERNAL MEDICINE

## 2021-01-15 PROCEDURE — 84154 ASSAY OF PSA FREE: CPT | Mod: 90 | Performed by: UROLOGY

## 2021-01-15 PROCEDURE — 80061 LIPID PANEL: CPT | Performed by: INTERNAL MEDICINE

## 2021-01-16 LAB
PSA FREE MFR SERPL: 33 %
PSA FREE SERPL-MCNC: 0.2 NG/ML
PSA SERPL-MCNC: 0.6 NG/ML (ref 0–4)

## 2021-01-18 ENCOUNTER — PRE VISIT (OUTPATIENT)
Dept: CARDIOLOGY | Facility: CLINIC | Age: 76
End: 2021-01-18

## 2021-01-18 NOTE — TELEPHONE ENCOUNTER
Wellness Screening Tool    Symptom Screening:    Do you have one of the following NEW symptoms:      Fever (subjective or >100.0)?  No    New cough? No    Shortness of breath? No    Chills? No    New loss of taste or smell? No    Generalized body aches? No    New persistent headache? No    New sore throat? No    Nausea, vomiting or diarrhea? No    Within the past 2 weeks, have you been exposed to someone with a known positive illness below?      COVID - 19 (known or suspected) No    Chicken pox?  No    Measles? No    Pertussis? No    Have you had a positive COVID-19 diagnostic test (nasal swab test) in the last 14 days or are you currently   on self-quarantine restrictions (i.e.travel restriction, exposure, etc?) No        Patient notified of visitor restriction: Yes  Patient informed to wear a mask: Yes    Patient's appointment status: Patient will be seen in clinic as scheduled on 1/19/21

## 2021-01-19 ENCOUNTER — OFFICE VISIT (OUTPATIENT)
Dept: CARDIOLOGY | Facility: CLINIC | Age: 76
End: 2021-01-19
Attending: INTERNAL MEDICINE
Payer: COMMERCIAL

## 2021-01-19 VITALS
OXYGEN SATURATION: 98 % | TEMPERATURE: 98 F | HEART RATE: 48 BPM | WEIGHT: 169.4 LBS | DIASTOLIC BLOOD PRESSURE: 75 MMHG | SYSTOLIC BLOOD PRESSURE: 156 MMHG | BODY MASS INDEX: 23.63 KG/M2

## 2021-01-19 DIAGNOSIS — I47.20 VENTRICULAR TACHYCARDIA (H): ICD-10-CM

## 2021-01-19 DIAGNOSIS — I25.10 CORONARY ARTERY DISEASE INVOLVING NATIVE HEART WITHOUT ANGINA PECTORIS, UNSPECIFIED VESSEL OR LESION TYPE: ICD-10-CM

## 2021-01-19 PROCEDURE — 99213 OFFICE O/P EST LOW 20 MIN: CPT | Performed by: INTERNAL MEDICINE

## 2021-01-19 NOTE — LETTER
1/19/2021    Sahil Kc MD  [address] [facility] [address]    RE: Dusty EVELIN Floresmichelle       Dear Colleague,    I had the pleasure of seeing Dusty EVELIN Ruggiero in the [facility].    CARDIOLOGY CLINIC CONSULTATION    REASON FOR CONSULT: Dizziness and mild coronary artery disease    PRIMARY CARE PHYSICIAN:  Sahil Kc    HISTORY OF PRESENT ILLNESS:  This a very pleasant 75-year-old patient who saw me in clinic in December 2020.  The patient has been complaining of exertional fatigue, had a murmur and has been complaining of dizziness.  A Zio patch monitor for that showed very frequent supraventricular ectopics.  Also had a short nonsustained run of VT.  Short runs of SVTs which were nonsustained as well.  No significant ventricular tachycardia or atrial fibrillation noted on repeat monitor.    Echocardiogram showed normal biventricular function without major valve disease.  He had a nuclear stress test which showed infarction in the anteroseptal and inferior walls without ischemia.  As a result of that he was seen by Dr. Napier and referred for coronary angiography which did not show any culprit lesions to explain the stress test findings.  He was noted to have a 70% lesion in the second obtuse marginal.  Given lack of classic symptoms, this was left to medical management.  He takes aspirin and statin for that.  Does not have any further anginal symptoms.    Of note previously he had been a little hypertensive and given these SVT runs we have put him on a low-dose of carvedilol at 6.25 twice daily.  But started making him very dizzy and as a result of that he stopped taking it.  Subsequently we increased his amlodipine from 5 to 10 mg.  He does not want to take beta-blockers.    Today is here for routine follow-up.  Denies any cardiovascular symptoms.  Home blood pressure readings are all in the 120s to 135 systolic range.  He had a nice extensive detailed log of his  blood pressure readings over the last 1 month.    He still complains of ongoing dizziness.  This is happening whenever he turns his head very quickly from one side to the other.  This is not orthostatic sounding.  He strongly attributes this to being positional.  He is also started having pain in his left ear.  No syncope presyncope.    PAST MEDICAL HISTORY:  Past Medical History:   Diagnosis Date     Generalized osteoarthrosis, unspecified site     Arthritis     Other anxiety states      Prostatitis, unspecified     History of Prostatitis     Ulcerative colitis, unspecified     History of Colitis     Unspecified essential hypertension        MEDICATIONS:  Current Outpatient Medications   Medication     amLODIPine (NORVASC) 5 MG tablet     aspirin (ASA) 81 MG EC tablet     Biotin 62186 MCG TABS     Cholecalciferol (VITAMIN D3 PO)     Coenzyme Q10 (COQ-10 PO)     ferrous sulfate 325 (65 FE) MG tablet     LORazepam (ATIVAN) 1 MG tablet     Naproxen Sodium (ALEVE PO)     omeprazole (PRILOSEC) 20 MG DR capsule     pravastatin (PRAVACHOL) 40 MG tablet     psyllium (METAMUCIL) 58.6 % packet     sertraline (ZOLOFT) 100 MG tablet     Zinc 30 MG TABS     No current facility-administered medications for this visit.        ALLERGIES:  Allergies   Allergen Reactions     Atenolol Shortness Of Breath     Other reaction(s): Unknown  Other reaction(s): Hard to breath     Atenolol Difficulty breathing     Rapid heart beat     Atorvastatin Muscle Pain (Myalgia)     Other reaction(s): Myalgias     Buspirone Difficulty breathing and Other (See Comments)     Other reaction(s): Unknown  Rapid heart beat     Coreg [Carvedilol] Dizziness     Gabapentin Other (See Comments)     Other reaction(s): Dizzy  Dizzy       Lyrica [Pregabalin] Other (See Comments)     Other reaction(s): dizziness  Dizzy       Benadryl [Diphenhydramine] Anxiety     Other reaction(s): excitablity     Vistaril [Hydroxyzine] Anxiety     Other reaction(s): dizzy,HA,  nausea         SOCIAL HISTORY:  I have reviewed this patient's social history and updated it with pertinent information if needed. Dusty Ruggiero  reports that he quit smoking about 25 years ago. His smoking use included cigarettes. He has a 5.00 pack-year smoking history. He has never used smokeless tobacco. He reports that he does not drink alcohol or use drugs.    FAMILY HISTORY:  I have reviewed this patient's family history and updated it with pertinent information if needed.   Family History   Problem Relation Age of Onset     Skin Cancer Father      Breast Cancer Sister      Melanoma No family hx of        REVIEW OF SYSTEMS:  Skin:  Positive for bruising   Eyes:  Negative    ENT:  Negative    Respiratory:  Negative    Cardiovascular:    Positive for;dizziness  Gastroenterology: Negative    Genitourinary:  Negative    Musculoskeletal:  Positive for joint pain;arthritis;joint swelling;joint stiffness  Neurologic:  Negative    Psychiatric:  Positive for depression;anxiety  Heme/Lymph/Imm:  Negative    Endocrine:  Negative        PHYSICAL EXAM:  Temp: 98  F (36.7  C) Temp src: Tympanic BP: (!) 156/75 Pulse: (!) 48     SpO2: 98 %      Vital Signs with Ranges  Temp:  [98  F (36.7  C)] 98  F (36.7  C)  Pulse:  [48] 48  BP: (156)/(75) 156/75  SpO2:  [98 %] 98 %  169 lbs 6.4 oz    Constitutional: awake, alert, no distress  Eyes: sclera nonicteric  ENT: trachea midline  Respiratory: Clear to auscultation bilaterally  Cardiovascular: Normal first and second heart sounds, JVP is normal there is no edema.  Auscultated heart rate was 75.  GI: nondistended  Neuropsychiatric: appropriate affact    DATA:  Labs: Pertinent cardiac labs reviewed    ASSESSMENT:  Noncardiac dizziness  Frequent supraventricular ectopics along with short nonsustained runs of supraventricular tachycardia  Single-vessel coronary artery disease without symptoms    RECOMMENDATIONS:  1. In regards to CAD, he is without angina.  I recommend continuing  aspirin statin therapy.  Recheck lipid panel in 1 year.  Call us with any change in clinical symptoms.  2. In regards to his dizziness, this is sounding very positional whenever he turns his head.  This is sounding noncardiac in nature.  I recommend ENT and neurology consultations.  I will alert PCP about this.  3. His home blood pressure readings are fantastic in the 120-135 systolic range.  I recommend continuing 10 mg of amlodipine.  He says his blood pressure readings are always a little higher in the clinic.  He can continue to follow-up with his PCP for that.  4. In regards to his cardiac monitoring findings, he has multiple episodes of supraventricular tachycardia the longest one lasting 16 seconds at an average rate of 127 bpm.  He does not want to take beta-blockers at this time because they make him dizzy.  We will conservatively watch these.  Should this get worse please alert us to try to see if putting him on a low-dose of antiarrhythmic could help.  5. Minimize NSAID use.  6. We will have him follow-up in our clinic in 1 year for routine CAD follow-up.    JAYSON Lambert, [facility]  Cardiology - [facility]  January 19, 2021      Thank you for allowing me to participate in the care of your patient.    Sincerely,     Giorgio Cisneros MD     [facility]

## 2021-01-19 NOTE — PROGRESS NOTES
CARDIOLOGY CLINIC CONSULTATION    REASON FOR CONSULT: Dizziness and mild coronary artery disease    PRIMARY CARE PHYSICIAN:  Sahil Kc    HISTORY OF PRESENT ILLNESS:  This a very pleasant 75-year-old patient who saw me in clinic in December 2020.  The patient has been complaining of exertional fatigue, had a murmur and has been complaining of dizziness.  A Zio patch monitor for that showed very frequent supraventricular ectopics.  Also had a short nonsustained run of VT.  Short runs of SVTs which were nonsustained as well.  No significant ventricular tachycardia or atrial fibrillation noted on repeat monitor.    Echocardiogram showed normal biventricular function without major valve disease.  He had a nuclear stress test which showed infarction in the anteroseptal and inferior walls without ischemia.  As a result of that he was seen by Dr. Napier and referred for coronary angiography which did not show any culprit lesions to explain the stress test findings.  He was noted to have a 70% lesion in the second obtuse marginal.  Given lack of classic symptoms, this was left to medical management.  He takes aspirin and statin for that.  Does not have any further anginal symptoms.    Of note previously he had been a little hypertensive and given these SVT runs we have put him on a low-dose of carvedilol at 6.25 twice daily.  But started making him very dizzy and as a result of that he stopped taking it.  Subsequently we increased his amlodipine from 5 to 10 mg.  He does not want to take beta-blockers.    Today is here for routine follow-up.  Denies any cardiovascular symptoms.  Home blood pressure readings are all in the 120s to 135 systolic range.  He had a nice extensive detailed log of his blood pressure readings over the last 1 month.    He still complains of ongoing dizziness.  This is happening whenever he turns his head very quickly from one side to the other.  This is not orthostatic sounding.  He  strongly attributes this to being positional.  He is also started having pain in his left ear.  No syncope presyncope.    PAST MEDICAL HISTORY:  Past Medical History:   Diagnosis Date     Generalized osteoarthrosis, unspecified site     Arthritis     Other anxiety states      Prostatitis, unspecified     History of Prostatitis     Ulcerative colitis, unspecified     History of Colitis     Unspecified essential hypertension        MEDICATIONS:  Current Outpatient Medications   Medication     amLODIPine (NORVASC) 5 MG tablet     aspirin (ASA) 81 MG EC tablet     Biotin 26507 MCG TABS     Cholecalciferol (VITAMIN D3 PO)     Coenzyme Q10 (COQ-10 PO)     ferrous sulfate 325 (65 FE) MG tablet     LORazepam (ATIVAN) 1 MG tablet     Naproxen Sodium (ALEVE PO)     omeprazole (PRILOSEC) 20 MG DR capsule     pravastatin (PRAVACHOL) 40 MG tablet     psyllium (METAMUCIL) 58.6 % packet     sertraline (ZOLOFT) 100 MG tablet     Zinc 30 MG TABS     No current facility-administered medications for this visit.        ALLERGIES:  Allergies   Allergen Reactions     Atenolol Shortness Of Breath     Other reaction(s): Unknown  Other reaction(s): Hard to breath     Atenolol Difficulty breathing     Rapid heart beat     Atorvastatin Muscle Pain (Myalgia)     Other reaction(s): Myalgias     Buspirone Difficulty breathing and Other (See Comments)     Other reaction(s): Unknown  Rapid heart beat     Coreg [Carvedilol] Dizziness     Gabapentin Other (See Comments)     Other reaction(s): Dizzy  Dizzy       Lyrica [Pregabalin] Other (See Comments)     Other reaction(s): dizziness  Dizzy       Benadryl [Diphenhydramine] Anxiety     Other reaction(s): excitablity     Vistaril [Hydroxyzine] Anxiety     Other reaction(s): dizzy,HA, nausea         SOCIAL HISTORY:  I have reviewed this patient's social history and updated it with pertinent information if needed. Dusty Ruggiero  reports that he quit smoking about 25 years ago. His smoking use included  cigarettes. He has a 5.00 pack-year smoking history. He has never used smokeless tobacco. He reports that he does not drink alcohol or use drugs.    FAMILY HISTORY:  I have reviewed this patient's family history and updated it with pertinent information if needed.   Family History   Problem Relation Age of Onset     Skin Cancer Father      Breast Cancer Sister      Melanoma No family hx of        REVIEW OF SYSTEMS:  Skin:  Positive for bruising   Eyes:  Negative    ENT:  Negative    Respiratory:  Negative    Cardiovascular:    Positive for;dizziness  Gastroenterology: Negative    Genitourinary:  Negative    Musculoskeletal:  Positive for joint pain;arthritis;joint swelling;joint stiffness  Neurologic:  Negative    Psychiatric:  Positive for depression;anxiety  Heme/Lymph/Imm:  Negative    Endocrine:  Negative        PHYSICAL EXAM:  Temp: 98  F (36.7  C) Temp src: Tympanic BP: (!) 156/75 Pulse: (!) 48     SpO2: 98 %      Vital Signs with Ranges  Temp:  [98  F (36.7  C)] 98  F (36.7  C)  Pulse:  [48] 48  BP: (156)/(75) 156/75  SpO2:  [98 %] 98 %  169 lbs 6.4 oz    Constitutional: awake, alert, no distress  Eyes: sclera nonicteric  ENT: trachea midline  Respiratory: Clear to auscultation bilaterally  Cardiovascular: Normal first and second heart sounds, JVP is normal there is no edema.  Auscultated heart rate was 75.  GI: nondistended  Neuropsychiatric: appropriate affact    DATA:  Labs: Pertinent cardiac labs reviewed    ASSESSMENT:  Noncardiac dizziness  Frequent supraventricular ectopics along with short nonsustained runs of supraventricular tachycardia  Single-vessel coronary artery disease without symptoms    RECOMMENDATIONS:  1. In regards to CAD, he is without angina.  I recommend continuing aspirin statin therapy.  Recheck lipid panel in 1 year.  Call us with any change in clinical symptoms.  2. In regards to his dizziness, this is sounding very positional whenever he turns his head.  This is sounding  noncardiac in nature.  I recommend ENT and neurology consultations.  I will alert PCP about this.  3. His home blood pressure readings are fantastic in the 120-135 systolic range.  I recommend continuing 10 mg of amlodipine.  He says his blood pressure readings are always a little higher in the clinic.  He can continue to follow-up with his PCP for that.  4. In regards to his cardiac monitoring findings, he has multiple episodes of supraventricular tachycardia the longest one lasting 16 seconds at an average rate of 127 bpm.  He does not want to take beta-blockers at this time because they make him dizzy.  We will conservatively watch these.  Should this get worse please alert us to try to see if putting him on a low-dose of antiarrhythmic could help.  5. Minimize NSAID use.  6. We will have him follow-up in our clinic in 1 year for routine CAD follow-up.    JAYSON Lambert, [facility]  Cardiology - [facility]  January 19, 2021

## 2021-01-25 ENCOUNTER — TELEPHONE (OUTPATIENT)
Dept: FAMILY MEDICINE | Facility: CLINIC | Age: 76
End: 2021-01-25

## 2021-01-25 DIAGNOSIS — I10 BENIGN HYPERTENSION: ICD-10-CM

## 2021-01-25 DIAGNOSIS — F41.1 ANXIETY STATE: ICD-10-CM

## 2021-01-25 NOTE — TELEPHONE ENCOUNTER
Reason for Call:  Medication refill:    Do you use a [facility]?  Name of the pharmacy and phone number for the current request:  [facility] [phone]    Name of the medication requested: Amlodipine 10 mg and Lorazepam 1 mg 3 times/day (Patient was on 5 mg Amlodipine)    Other request: Cardiologist wants to Dr. Kc to  recommend dizziness and inner ear to recommend two different specialists.     Can we leave a detailed message on this number? YES    Phone number patient can be reached at: Home number on file [phone] (home)    Best Time: Any    Call taken on 1/25/2021 at 9:25 AM by Stephanie Crisostomo

## 2021-01-25 NOTE — TELEPHONE ENCOUNTER
Dr. Kc     I have pended the amlodipine 10 mg tablet for your approval.   Routing refill request to provider for review/approval because:  Drug not on the [facility] refill protocol Lenora HAYWOOD RN

## 2021-01-27 ENCOUNTER — TELEPHONE (OUTPATIENT)
Dept: FAMILY MEDICINE | Facility: CLINIC | Age: 76
End: 2021-01-27

## 2021-01-27 DIAGNOSIS — R42 DIZZINESS: Primary | ICD-10-CM

## 2021-01-27 RX ORDER — LORAZEPAM 1 MG/1
1 TABLET ORAL 3 TIMES DAILY PRN
Qty: 90 TABLET | Refills: 5 | Status: SHIPPED | OUTPATIENT
Start: 2021-01-27 | End: 2021-08-30

## 2021-01-27 RX ORDER — AMLODIPINE BESYLATE 10 MG/1
10 TABLET ORAL DAILY
Qty: 90 TABLET | Refills: 3 | Status: SHIPPED | OUTPATIENT
Start: 2021-01-27 | End: 2021-11-24

## 2021-01-27 NOTE — TELEPHONE ENCOUNTER
[facility]. Stephanie Crisostomo on 1/27/2021 at 4:13 PM

## 2021-01-27 NOTE — TELEPHONE ENCOUNTER
Reason for Call: Request for a referral:    Referral being requested: Neurologist and ENT     Date needed: as soon as possible    Has the patient been seen by the PCP for this problem? YES    Additional comments: Patient has a dizziness issue, not his heart. Cardiologist wants patient to see Neurologist and ENT     Phone number Patient can be reached at:  Home number on file [phone] (home)    Best Time:  Any    Can we leave a detailed message on this number?  YES    Call taken on 1/27/2021 at 11:07 AM by Stephanie Crisostomo

## 2021-01-27 NOTE — TELEPHONE ENCOUNTER
"Referrals for neuro and ENT (and audiology due to postural, please complete \"procedures\" on this one) ready per Dr. Cisneros's recommendations at LOV 1-19-21, notes below:  In regards to his dizziness, this is sounding very positional whenever he turns his head.  This is sounding noncardiac in nature.  I recommend ENT and neurology consultations.  I will alert PCP about this.    Routing to provider.  Lynda JARA MSN, RN      "

## 2021-01-27 NOTE — TELEPHONE ENCOUNTER
Reason for Call:  Medication refill:    Do you use a [facility]?  Name of the pharmacy and phone number for the current request:  [facility]    Name of the medication requested: Amlodipine, Lorazepam    Other request: Patient has appt with Dr. Kc    Can we leave a detailed message on this number? YES    Phone number patient can be reached at: Home number on file [phone] (home)    Best Time: Any    Call taken on 1/27/2021 at 11:22 AM by Stephanie Crisostomo

## 2021-01-27 NOTE — TELEPHONE ENCOUNTER
Please notify prescription sent to pharmacy for the 10 mg dose of Amlodipine.     The Ativan is signed. Please help him get this.     .Sahil Kc MD

## 2021-01-27 NOTE — TELEPHONE ENCOUNTER
Patient wants 10mg tablets, instead of 5 mg tablets. Stephanie Crisostomo on 1/27/2021 at 11:50 AM

## 2021-01-27 NOTE — TELEPHONE ENCOUNTER
See below.    Per VV 12-24-20 if patient's bp was above goal, he was to call and amlodipine increased to 10 mg daily.  Patient did not call, instead increased to 10 mg on his own.    Ok with this?    Routing to provider.  Lynda JARA MSN, RN

## 2021-01-27 NOTE — TELEPHONE ENCOUNTER
Message left for patient to return call to clinic.  CSS - ok to deliver message below.    Lynda JARA MSN, RN

## 2021-01-27 NOTE — TELEPHONE ENCOUNTER
Patient notified referrals are in, patient transferred to Specialty. Stephanie Crisostomo on 1/27/2021 at 4:20 PM

## 2021-02-03 NOTE — PROGRESS NOTES
"Chief Complaint   Patient presents with     Vertigo     Check ears/hearing/vertigo symptoms x 2 years-has been to [facility]-Last fall - symptoms are about the same/hx of TMJ and left jaw area hurts him at night     History of Present Illness  Dusty Ruggiero is a 75 year old male who presents for ear evaluation.  I am seeing this patient in consultation for dizziness at the request of the provider Dr. Kc.  The patient describes a significant prolonged history of an unsteady or \"foggy\" feeling.  He feels imbalanced or unsteady.  When he gets up from bed before standing, he will get lightheaded if he does not sit for 20 or 30 seconds after the position change.  He has had a few falls in the past 6 months.  He denies any significant room spinning or vertigo.  Mostly gets unsteadiness and imbalance.  He feels like his strength is down he is quite weak.  His past history is significant for previous right knee replacement.  He also has significant chronic back issues and some lower extremity paresthesias consistent with peripheral neuropathy.  He has had bilateral cataract surgery.  He does report a long history of asymmetric hearing loss since he was screened to the .  His right ear is always been worse.  He has a very remote history of an MRI of the brain and said this was normal.  Over the past several months, the balance issue has been more of a problem.  He was seen at the [facility] and they started treating him with vestibular therapy.  He felt like the exercises were not helpful and actually made his back quite a bit worse.        He does report some intermittent left-sided otalgia.  He has a history of temporomandibular joint dysfunction.  He used to wear an oral appliance about 15 years ago but this has not bothered him until recently.  He does have popping and locking of his jaw occasionally.  He denies any otorrhea, bloody otorrhea.  No previous history of ear surgery. 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  He does have an upcoming appointment with neurology likely for his neuropathy issues.    Past Medical History  Patient Active Problem List   Diagnosis     Osteoarthritis     DDD (degenerative disc disease), lumbar     Degenerative arthritis of lumbar spine     Ulcerative colitis (H)     Anxiety state     Benign hypertension     Benign prostatic hyperplasia with urinary obstruction     Esophageal reflux     Hereditary and idiopathic peripheral neuropathy     Iron deficiency anemia due to chronic blood loss     Knee joint replacement by other means     Major depressive disorder with single episode, in full remission (H)     Mixed hyperlipidemia     Palpitations     Pure hypercholesterolemia     Testicular hypofunction     Vitamin D deficiency     History of colectomy     Ventricular tachycardia (H)     Abnormal stress test     NSVT (nonsustained ventricular tachycardia) (H)     Status post coronary angiogram     Current Medications    Current Outpatient Medications:      amLODIPine (NORVASC) 10 MG tablet, Take 1 tablet (10 mg) by mouth daily, Disp: 90 tablet, Rfl: 3     aspirin (ASA) 81 MG EC tablet, Take 1 tablet (81 mg) by mouth daily, Disp:  , Rfl:      Biotin 37097 MCG TABS, , Disp: , Rfl:      Cholecalciferol (VITAMIN D3 PO), Take 5,000 Units by mouth daily, Disp: , Rfl:      Coenzyme Q10 (COQ-10 PO), Take 1 tablet by mouth daily, Disp: , Rfl:      ferrous sulfate 325 (65 FE) MG tablet, Take 325 mg by mouth daily, Disp: , Rfl:      LORazepam (ATIVAN) 1 MG tablet, Take 1 tablet (1 mg) by mouth 3 times daily as needed for anxiety, Disp: 90 tablet, Rfl: 5     Naproxen Sodium (ALEVE PO), Take 220 mg by mouth Takes 3 times daily, Disp: , Rfl:      omeprazole (PRILOSEC) 20 MG DR capsule, 1 CAPSULE by mouth daily as needed, Disp: 90 capsule, Rfl: 3     pravastatin (PRAVACHOL) 40 MG  tablet, Take 1 tablet (40 mg) by mouth every evening, Disp: 90 tablet, Rfl: 3     psyllium (METAMUCIL) 58.6 % packet, Take 1 packet by mouth daily, Disp: , Rfl:      sertraline (ZOLOFT) 100 MG tablet, Take 50 mg in the am, and 100 mg in the pm, Disp: 135 tablet, Rfl: 3     Zinc 30 MG TABS, , Disp: , Rfl:     Allergies   Allergies   Allergen Reactions     Atenolol Shortness Of Breath     Other reaction(s): Unknown  Other reaction(s): Hard to breath     Atenolol Difficulty breathing     Rapid heart beat     Atorvastatin Muscle Pain (Myalgia)     Other reaction(s): Myalgias     Buspirone Difficulty breathing and Other (See Comments)     Other reaction(s): Unknown  Rapid heart beat     Coreg [Carvedilol] Dizziness     Gabapentin Other (See Comments)     Other reaction(s): Dizzy  Dizzy       Lyrica [Pregabalin] Other (See Comments)     Other reaction(s): dizziness  Dizzy       Benadryl [Diphenhydramine] Anxiety     Other reaction(s): excitablity     Vistaril [Hydroxyzine] Anxiety     Other reaction(s): dizzy,HA, nausea         Social History   Social History     Socioeconomic History     Marital status:      Spouse name: Not on file     Number of children: Not on file     Years of education: Not on file     Highest education level: Not on file   Occupational History     Not on file   Social Needs     Financial resource strain: Not on file     Food insecurity     Worry: Not on file     Inability: Not on file     Transportation needs     Medical: Not on file     Non-medical: Not on file   Tobacco Use     Smoking status: Former Smoker     Packs/day: 0.25     Years: 20.00     Pack years: 5.00     Types: Cigarettes     Quit date: 1995     Years since quittin.0     Smokeless tobacco: Never Used   Substance and Sexual Activity     Alcohol use: No     Comment: stopped a couple of months     Drug use: No     Sexual activity: Not Currently   Lifestyle     Physical activity     Days per week: Not on file     Minutes  "per session: Not on file     Stress: Not on file   Relationships     Social connections     Talks on phone: Not on file     Gets together: Not on file     Attends Yazidism service: Not on file     Active member of club or organization: Not on file     Attends meetings of clubs or organizations: Not on file     Relationship status: Not on file     Intimate partner violence     Fear of current or ex partner: Not on file     Emotionally abused: Not on file     Physically abused: Not on file     Forced sexual activity: Not on file   Other Topics Concern     Parent/sibling w/ CABG, MI or angioplasty before 65F 55M? No   Social History Narrative     Not on file       Family History  Family History   Problem Relation Age of Onset     Skin Cancer Father      Breast Cancer Sister      Melanoma No family hx of        Review of Systems  As per HPI and PMHx, otherwise 10+ comprehensive system review is negative.    Physical Exam  /85 (BP Location: Right arm, Patient Position: Sitting, Cuff Size: Adult Regular)   Pulse 86   Temp 97.8  F (36.6  C) (Tympanic)   Ht 1.803 m (5' 11\")   Wt 77.1 kg (170 lb)   BMI 23.71 kg/m    GENERAL: Patient is a pleasant, cooperative 75 year old male in no acute distress.  HEAD: Normocephalic, atraumatic.  Hair and scalp are normal.  EYES: Pupils are equal, round, reactive to light and accommodation.  Extraocular movements are intact.  The sclera nonicteric without injection.  The extraocular structures are normal.  EARS: Normal shape and symmetry.  No tenderness when palpating the mastoid or tragal areas bilaterally.  Otoscopic exam reveals a minimal amount of cerumen bilaterally.  The bilateral tympanic membranes are round, intact without evidence of effusion, good landmarks.  No retraction, granulation, or drainage.  NEUROLOGIC: Cranial nerves II through XII are grossly intact.  Voice is strong.  Patient is House-Brackmann I/VI bilaterally.  Trigeminal nerve function in V1, V2, and V3 " is equal and symmetric.  Normal tandem gate, normal heal to toe.  Finger-nose-finger is normal.  Absent Romberg, negative pronator drift.  CARDIOVASCULAR: Extremities are warm and well-perfused.  No significant peripheral edema.  RESPIRATORY: Patient has nonlabored breathing without cough, wheeze, stridor.  PSYCHIATRIC: Patient is alert and oriented.  Mood and affect appear normal.  SKIN: Warm and dry.  No scalp, face, or neck lesions noted.    Audiogram  The patient underwent an audiogram performed today.  My review of the audiogram shows mild sloping to profound sensorineural hearing loss in the right ear and mild sloping to moderate sensorineural hearing loss in the left ear.  Pure-tone average is 55 dB on the right and 31 dB on the left.  Speech reception threshhold is 35 dB on the right and 25 dB on the left.  The patient had 76% word recognition on the right and 88% word recognition on the left.  The patient had a type A tympanogram on the right and a type A tympanogram on the left.  Ipsilateral and contralateral otoacoustic reflexes are present bilaterally.    Assessment and Plan     ICD-10-CM    1. Asymmetrical right sensorineural hearing loss  H90.41 AUDIOLOGY ADULT REFERRAL     MR Brain w/o & w Contrast     PHYSICAL THERAPY REFERRAL   2. Sensorineural hearing loss, bilateral  H90.3 AUDIOLOGY ADULT REFERRAL     MR Brain w/o & w Contrast     PHYSICAL THERAPY REFERRAL   3. Dizziness  R42 AUDIOLOGY ADULT REFERRAL     AUDIOLOGY BALANCE REFERRAL     MR Brain w/o & w Contrast     PHYSICAL THERAPY REFERRAL   4. Imbalance  R26.89 AUDIOLOGY ADULT REFERRAL     MR Brain w/o & w Contrast     PHYSICAL THERAPY REFERRAL   5. Peripheral polyneuropathy  G62.9 AUDIOLOGY ADULT REFERRAL     MR Brain w/o & w Contrast     PHYSICAL THERAPY REFERRAL   6. Temporomandibular joint disorder  M26.609    7. Left ear pain  H92.02      It was my pleasure seeing Dusty Ruggiero today in clinic.  The patient presents with significant sense  of imbalance/unsteadiness.  He is not really having sigifredo vertigo.  His neurologic exam is within normal limits today.  We discussed an MRI of the brain with IAC protocol given his significant asymmetric hearing loss and his balance problems.  It has been quite sometime since his last MRI and I think that this is warranted given his symptoms.    His audiogram does suggest a fairly significant asymmetric hearing loss worse on the right-hand side.  He would be a candidate for hearing aids if he would like to try amplification.     We discussed vestibular evaluation and testing at the [facility].  This might help us identify a vestibular weakness.  We also discussed more aggressive vestibular physical therapy that could be done in conjunction with his physical therapy for his back.  After some discussion, the patient would like to try vestibular therapy prior to going to the [place] for vestibular testing, which I think is reasonable.    I will obtain the MRI and contact the patient with the results when available.    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  I have discussed the etiology of TMJ, and the reasons why referred pain can mimic symptoms of ear disease, headaches, and even sinusitis.  I have given the patient an instructional sheet of things to be tried at home. Finally, I counseled the patient that should the therapy not help, or should the symptoms change, that they should return to me, or contact me for a referral to a TMJ specialist.    Rudy Ortega MD  Department of Otolarygology-Head and Neck Surgery  [facility]

## 2021-02-04 ENCOUNTER — OFFICE VISIT (OUTPATIENT)
Dept: AUDIOLOGY | Facility: CLINIC | Age: 76
End: 2021-02-04
Attending: FAMILY MEDICINE
Payer: COMMERCIAL

## 2021-02-04 ENCOUNTER — OFFICE VISIT (OUTPATIENT)
Dept: OTOLARYNGOLOGY | Facility: CLINIC | Age: 76
End: 2021-02-04
Attending: FAMILY MEDICINE
Payer: COMMERCIAL

## 2021-02-04 VITALS
SYSTOLIC BLOOD PRESSURE: 137 MMHG | HEIGHT: 71 IN | TEMPERATURE: 97.8 F | DIASTOLIC BLOOD PRESSURE: 85 MMHG | WEIGHT: 170 LBS | HEART RATE: 86 BPM | BODY MASS INDEX: 23.8 KG/M2

## 2021-02-04 DIAGNOSIS — H92.02 LEFT EAR PAIN: ICD-10-CM

## 2021-02-04 DIAGNOSIS — R26.89 IMBALANCE: ICD-10-CM

## 2021-02-04 DIAGNOSIS — H90.3 SENSORINEURAL HEARING LOSS, ASYMMETRICAL: Primary | ICD-10-CM

## 2021-02-04 DIAGNOSIS — M26.609 TEMPOROMANDIBULAR JOINT DISORDER: ICD-10-CM

## 2021-02-04 DIAGNOSIS — H90.3 SENSORINEURAL HEARING LOSS, BILATERAL: ICD-10-CM

## 2021-02-04 DIAGNOSIS — R42 DIZZINESS: ICD-10-CM

## 2021-02-04 DIAGNOSIS — G62.9 PERIPHERAL POLYNEUROPATHY: ICD-10-CM

## 2021-02-04 PROCEDURE — 92557 COMPREHENSIVE HEARING TEST: CPT | Performed by: AUDIOLOGIST

## 2021-02-04 PROCEDURE — 99204 OFFICE O/P NEW MOD 45 MIN: CPT | Performed by: OTOLARYNGOLOGY

## 2021-02-04 PROCEDURE — 99207 PR NO CHARGE LOS: CPT | Performed by: AUDIOLOGIST

## 2021-02-04 PROCEDURE — 92550 TYMPANOMETRY & REFLEX THRESH: CPT | Performed by: AUDIOLOGIST

## 2021-02-04 ASSESSMENT — MIFFLIN-ST. JEOR: SCORE: 1528.24

## 2021-02-04 NOTE — PATIENT INSTRUCTIONS
Per physician instructions      If you have questions or concerns on any instructions given to you by your provider today or if you need to schedule an appointment, you can reach us at [phone].     Temporal mandibular joint dysfunction instructions    When symptomatic:  1. Soft diet  2. No chewing gum  3. Use ibuprofen 400-600 mg every 6 hours as needed  4. Warm compresses over jaw joint in front of ears  5. Use a bite guard at night, consider Ken's Grind-No-More which is available OTC. May also discuss with dentist to make a oral appliance.     TMJ exercises:  1. Close your mouth to touch your upper and lower teeth without clenching them. Rest the tip of your tongue on your palate behind your upper teeth.  2. Slide the tip of your tongue backwards toward the throat as far as you can, keeping your teeth together.  3. Try to touch the soft palate with the tip of your tongue, and keep it there. Then slowly open your mouth until you feel your tongue is being pulled away from the soft palate. Keep your mouth open in this position for five seconds before closing your mouth to relax.  4. Repeat the above steps slowly for 5 minutes. You may want to check in a mirror to be sure your teeth are closed aligned straight up and down, without your jaw being off to one side.     Do the exercises twice daily for the first 2 weeks, but then you can do it more often if it seems to help. You shouldn't hear any clicks or noise from your joints, and if you do then you should restart the exercise rather than opening the mouth further. With practice, you will be able to open further without having clicking.

## 2021-02-04 NOTE — NURSING NOTE
"Initial /85 (BP Location: Right arm, Patient Position: Sitting, Cuff Size: Adult Regular)   Pulse 86   Temp 97.8  F (36.6  C) (Tympanic)   Ht 1.803 m (5' 11\")   Wt 77.1 kg (170 lb)   BMI 23.71 kg/m   Estimated body mass index is 23.71 kg/m  as calculated from the following:    Height as of this encounter: 1.803 m (5' 11\").    Weight as of this encounter: 77.1 kg (170 lb). .    Gena Love CMA    "

## 2021-02-04 NOTE — PROGRESS NOTES
AUDIOLOGY REPORT    SUBJECTIVE:  Dusty Ruggiero is a 75 year old male who was seen at [facility] for an audiologic evaluation, referred by Dr. Ortega.  No previous audiograms are available at today's appointment.  The patient reports a few year history of dizziness and feeling off-balanced. He notes intermittent left ear pain. He reports a known asymmetrical hearing loss since young adulthood. He also has a long history of  and occupational noise exposure. The patient denies bilateral tinnitus and bilateral drainage.     OBJECTIVE:    Otoscopic exam indicates ears are clear of cerumen bilaterally       Pure Tone Thresholds assessed using conventional audiometry with good  reliability from 250-8000 Hz bilaterally using insert earphones and circumaural headphones     RIGHT:  borderline-normal, mild, severe and profound sensorineural hearing loss    LEFT:    borderline-normal, mild and moderate sensorineural hearing loss    Tympanogram:    RIGHT: normal eardrum mobility    LEFT:   normal eardrum mobility    Reflexes (reported by stimulus ear 1000 Hz):     RIGHT: Ipsilateral is present    RIGHT: Contralateral is present    LEFT: Ipsilateral is present    LEFT: Contralateral is present    Speech Reception Threshold:    RIGHT: 35 dB HL    LEFT:   25 dB HL  Word Recognition Score:     RIGHT: 76% at 75 dB HL using NU-6 recorded word list.    LEFT:   88% at 65 dB HL using NU-6 recorded word list.      ASSESSMENT:   Borderline normal hearing through 1000 Hz sloping to a mild to moderate sensorineural hearing loss in the left ear with a mild to profound sensorineural hearing loss in the right ear.     Today s results were discussed with the patient in detail.     PLAN: It is recommended that the patient be seen by Dr. Ortega for medical evaluation of their ears and hearing evaluation. Patient was counseled regarding hearing loss and impact on communication. Patient is a good candidate for  amplification at this time.  The patient reports he is not interested in amplification as this time.  Please call this clinic with questions regarding these results or recommendations.        Jenn Youssef M.A. EYAL-AAA  Clinical audiologist [place] # [id number]  2/4/2021

## 2021-02-04 NOTE — LETTER
"    2/4/2021         RE: Dusty Ruggiero  [address]  [facility] [address]        Dear Colleague,    Thank you for referring your patient, Dusty Ruggiero, to the [facility]. Please see a copy of my visit note below.    Chief Complaint   Patient presents with     Vertigo     Check ears/hearing/vertigo symptoms x 2 years-[facility] - symptoms are about the same/hx of TMJ and left jaw area hurts him at night     History of Present Illness  Dusty Ruggiero is a 75 year old male who presents for ear evaluation.  I am seeing this patient in consultation for dizziness at the request of the provider Dr. Kc.  The patient describes a significant prolonged history of an unsteady or \"foggy\" feeling.  He feels imbalanced or unsteady.  When he gets up from bed before standing, he will get lightheaded if he does not sit for 20 or 30 seconds after the position change.  He has had a few falls in the past 6 months.  He denies any significant room spinning or vertigo.  Mostly gets unsteadiness and imbalance.  He feels like his strength is down he is quite weak.  His past history is significant for previous right knee replacement.  He also has significant chronic back issues and some lower extremity paresthesias consistent with peripheral neuropathy.  He has had bilateral cataract surgery.  He does report a long history of asymmetric hearing loss since he was screened to the .  His right ear is always been worse.  He has a very remote history of an MRI of the brain and said this was normal.  Over the past several months, the balance issue has been more of a problem.  He was seen at [facility] and they started treating him with vestibular therapy.  He felt like the exercises were not helpful and actually made his back quite a bit worse.        He does report some intermittent left-sided otalgia.  He has a history of temporomandibular joint " dysfunction.  He used to wear an oral appliance about 15 years ago but this has not bothered him until recently.  He does have popping and locking of his jaw occasionally.  He denies any otorrhea, bloody otorrhea.  No previous history of ear surgery.  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  He does have an upcoming appointment with neurology likely for his neuropathy issues.    Past Medical History  Patient Active Problem List   Diagnosis     Osteoarthritis     DDD (degenerative disc disease), lumbar     Degenerative arthritis of lumbar spine     Ulcerative colitis (H)     Anxiety state     Benign hypertension     Benign prostatic hyperplasia with urinary obstruction     Esophageal reflux     Hereditary and idiopathic peripheral neuropathy     Iron deficiency anemia due to chronic blood loss     Knee joint replacement by other means     Major depressive disorder with single episode, in full remission (H)     Mixed hyperlipidemia     Palpitations     Pure hypercholesterolemia     Testicular hypofunction     Vitamin D deficiency     History of colectomy     Ventricular tachycardia (H)     Abnormal stress test     NSVT (nonsustained ventricular tachycardia) (H)     Status post coronary angiogram     Current Medications    Current Outpatient Medications:      amLODIPine (NORVASC) 10 MG tablet, Take 1 tablet (10 mg) by mouth daily, Disp: 90 tablet, Rfl: 3     aspirin (ASA) 81 MG EC tablet, Take 1 tablet (81 mg) by mouth daily, Disp:  , Rfl:      Biotin 81487 MCG TABS, , Disp: , Rfl:      Cholecalciferol (VITAMIN D3 PO), Take 5,000 Units by mouth daily, Disp: , Rfl:      Coenzyme Q10 (COQ-10 PO), Take 1 tablet by mouth daily, Disp: , Rfl:      ferrous sulfate 325 (65 FE) MG tablet, Take 325 mg by mouth daily, Disp: , Rfl:      LORazepam (ATIVAN) 1 MG tablet, Take 1 tablet (1 mg) by mouth 3 times daily as needed for anxiety,  Disp: 90 tablet, Rfl: 5     Naproxen Sodium (ALEVE PO), Take 220 mg by mouth Takes 3 times daily, Disp: , Rfl:      omeprazole (PRILOSEC) 20 MG DR capsule, 1 CAPSULE by mouth daily as needed, Disp: 90 capsule, Rfl: 3     pravastatin (PRAVACHOL) 40 MG tablet, Take 1 tablet (40 mg) by mouth every evening, Disp: 90 tablet, Rfl: 3     psyllium (METAMUCIL) 58.6 % packet, Take 1 packet by mouth daily, Disp: , Rfl:      sertraline (ZOLOFT) 100 MG tablet, Take 50 mg in the am, and 100 mg in the pm, Disp: 135 tablet, Rfl: 3     Zinc 30 MG TABS, , Disp: , Rfl:     Allergies   Allergies   Allergen Reactions     Atenolol Shortness Of Breath     Other reaction(s): Unknown  Other reaction(s): Hard to breath     Atenolol Difficulty breathing     Rapid heart beat     Atorvastatin Muscle Pain (Myalgia)     Other reaction(s): Myalgias     Buspirone Difficulty breathing and Other (See Comments)     Other reaction(s): Unknown  Rapid heart beat     Coreg [Carvedilol] Dizziness     Gabapentin Other (See Comments)     Other reaction(s): Dizzy  Dizzy       Lyrica [Pregabalin] Other (See Comments)     Other reaction(s): dizziness  Dizzy       Benadryl [Diphenhydramine] Anxiety     Other reaction(s): excitablity     Vistaril [Hydroxyzine] Anxiety     Other reaction(s): dizzy,HA, nausea         Social History   Social History     Socioeconomic History     Marital status:      Spouse name: Not on file     Number of children: Not on file     Years of education: Not on file     Highest education level: Not on file   Occupational History     Not on file   Social Needs     Financial resource strain: Not on file     Food insecurity     Worry: Not on file     Inability: Not on file     Transportation needs     Medical: Not on file     Non-medical: Not on file   Tobacco Use     Smoking status: Former Smoker     Packs/day: 0.25     Years: 20.00     Pack years: 5.00     Types: Cigarettes     Quit date: 1995     Years since quittin.0      "Smokeless tobacco: Never Used   Substance and Sexual Activity     Alcohol use: No     Comment: stopped a couple of months     Drug use: No     Sexual activity: Not Currently   Lifestyle     Physical activity     Days per week: Not on file     Minutes per session: Not on file     Stress: Not on file   Relationships     Social connections     Talks on phone: Not on file     Gets together: Not on file     Attends Pentec[employer]alism service: Not on file     Active member of club or organization: Not on file     Attends meetings of clubs or organizations: Not on file     Relationship status: Not on file     Intimate partner violence     Fear of current or ex partner: Not on file     Emotionally abused: Not on file     Physically abused: Not on file     Forced sexual activity: Not on file   Other Topics Concern     Parent/sibling w/ CABG, MI or angioplasty before 65F 55M? No   Social History Narrative     Not on file       Family History  Family History   Problem Relation Age of Onset     Skin Cancer Father      Breast Cancer Sister      Melanoma No family hx of        Review of Systems  As per HPI and PMHx, otherwise 10+ comprehensive system review is negative.    Physical Exam  /85 (BP Location: Right arm, Patient Position: Sitting, Cuff Size: Adult Regular)   Pulse 86   Temp 97.8  F (36.6  C) (Tympanic)   Ht 1.803 m (5' 11\")   Wt 77.1 kg (170 lb)   BMI 23.71 kg/m    GENERAL: Patient is a pleasant, cooperative 75 year old male in no acute distress.  HEAD: Normocephalic, atraumatic.  Hair and scalp are normal.  EYES: Pupils are equal, round, reactive to light and accommodation.  Extraocular movements are intact.  The sclera nonicteric without injection.  The extraocular structures are normal.  EARS: Normal shape and symmetry.  No tenderness when palpating the mastoid or tragal areas bilaterally.  Otoscopic exam reveals a minimal amount of cerumen bilaterally.  The bilateral tympanic membranes are round, intact without " evidence of effusion, good landmarks.  No retraction, granulation, or drainage.  NEUROLOGIC: Cranial nerves II through XII are grossly intact.  Voice is strong.  Patient is House-Brackmann I/VI bilaterally.  Trigeminal nerve function in V1, V2, and V3 is equal and symmetric.  Normal tandem gate, normal heal to toe.  Finger-nose-finger is normal.  Absent Romberg, negative pronator drift.  CARDIOVASCULAR: Extremities are warm and well-perfused.  No significant peripheral edema.  RESPIRATORY: Patient has nonlabored breathing without cough, wheeze, stridor.  PSYCHIATRIC: Patient is alert and oriented.  Mood and affect appear normal.  SKIN: Warm and dry.  No scalp, face, or neck lesions noted.    Audiogram  The patient underwent an audiogram performed today.  My review of the audiogram shows mild sloping to profound sensorineural hearing loss in the right ear and mild sloping to moderate sensorineural hearing loss in the left ear.  Pure-tone average is 55 dB on the right and 31 dB on the left.  Speech reception threshhold is 35 dB on the right and 25 dB on the left.  The patient had 76% word recognition on the right and 88% word recognition on the left.  The patient had a type A tympanogram on the right and a type A tympanogram on the left.  Ipsilateral and contralateral otoacoustic reflexes are present bilaterally.    Assessment and Plan     ICD-10-CM    1. Asymmetrical right sensorineural hearing loss  H90.41 AUDIOLOGY ADULT REFERRAL     MR Brain w/o & w Contrast     PHYSICAL THERAPY REFERRAL   2. Sensorineural hearing loss, bilateral  H90.3 AUDIOLOGY ADULT REFERRAL     MR Brain w/o & w Contrast     PHYSICAL THERAPY REFERRAL   3. Dizziness  R42 AUDIOLOGY ADULT REFERRAL     AUDIOLOGY BALANCE REFERRAL     MR Brain w/o & w Contrast     PHYSICAL THERAPY REFERRAL   4. Imbalance  R26.89 AUDIOLOGY ADULT REFERRAL     MR Brain w/o & w Contrast     PHYSICAL THERAPY REFERRAL   5. Peripheral polyneuropathy  G62.9 AUDIOLOGY ADULT  REFERRAL     MR Brain w/o & w Contrast     PHYSICAL THERAPY REFERRAL   6. Temporomandibular joint disorder  M26.609    7. Left ear pain  H92.02      It was my pleasure seeing Dusty Ruggiero today in clinic.  The patient presents with significant sense of imbalance/unsteadiness.  He is not really having sigifredo vertigo.  His neurologic exam is within normal limits today.  We discussed an MRI of the brain with IAC protocol given his significant asymmetric hearing loss and his balance problems.  It has been quite sometime since his last MRI and I think that this is warranted given his symptoms.    His audiogram does suggest a fairly significant asymmetric hearing loss worse on the right-hand side.  He would be a candidate for hearing aids if he would like to try amplification.     We discussed vestibular evaluation and testing at the [facility].  This might help us identify a vestibular weakness.  We also discussed more aggressive vestibular physical therapy that could be done in conjunction with his physical therapy for his back.  After some discussion, the patient would like to try vestibular therapy prior to going to the [facility] for vestibular testing, which I think is reasonable.    I will obtain the MRI and contact the patient with the results when available.    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  I have discussed the etiology of TMJ, and the reasons why referred pain can mimic symptoms of ear disease, headaches, and even sinusitis.  I have given the patient an instructional sheet of things to be tried at home. Finally, I counseled the patient that should the therapy not help, or should the symptoms change, that they should return to me, or contact me for a referral to a TMJ specialist.    Rudy Ortega MD  Department of Otolarygology-Head and Neck Surgery  [facility]         Again, thank you for allowing me to participate in the care of your patient.         Sincerely,        Rudy Ortega MD

## 2021-02-09 ENCOUNTER — IMMUNIZATION (OUTPATIENT)
Dept: NURSING | Facility: CLINIC | Age: 76
End: 2021-02-09
Payer: COMMERCIAL

## 2021-02-09 PROCEDURE — 91300 PR COVID VAC PFIZER DIL RECON 30 MCG/0.3 ML IM: CPT

## 2021-02-09 PROCEDURE — 0001A PR COVID VAC PFIZER DIL RECON 30 MCG/0.3 ML IM: CPT

## 2021-02-10 ENCOUNTER — HOSPITAL ENCOUNTER (OUTPATIENT)
Dept: PHYSICAL THERAPY | Facility: CLINIC | Age: 76
Setting detail: THERAPIES SERIES
End: 2021-02-10
Attending: OTOLARYNGOLOGY
Payer: COMMERCIAL

## 2021-02-10 DIAGNOSIS — H90.3 SENSORINEURAL HEARING LOSS, BILATERAL: ICD-10-CM

## 2021-02-10 DIAGNOSIS — R42 DIZZINESS: ICD-10-CM

## 2021-02-10 DIAGNOSIS — G62.9 PERIPHERAL POLYNEUROPATHY: ICD-10-CM

## 2021-02-10 DIAGNOSIS — R26.89 IMBALANCE: ICD-10-CM

## 2021-02-10 PROCEDURE — 97110 THERAPEUTIC EXERCISES: CPT | Mod: GP | Performed by: PHYSICAL THERAPIST

## 2021-02-10 PROCEDURE — 97162 PT EVAL MOD COMPLEX 30 MIN: CPT | Mod: GP | Performed by: PHYSICAL THERAPIST

## 2021-02-10 NOTE — PROGRESS NOTES
02/10/21 0900   Quick Adds   Quick Adds Vestibular Eval   Type of Visit Initial OP PT Evaluation   General Information   Start of Care Date 02/10/21   Referring Physician Rudy Ortega   Orders Evaluate and Treat as Indicated   Order Date 02/04/21   Medical Diagnosis asymmetrical right sensorineural hearing loss, sensorineural hearing los, bilateral, imbalance, peripheral polyneuropathy    Onset of illness/injury or Date of Surgery 02/04/21  (chronic in nature since childhood, date above is order date)   Precautions/Limitations no known precautions/limitations   Surgical/Medical history reviewed Yes   Pertinent history of current problem (include personal factors and/or comorbidities that impact the POC) Has had chronic hearing loss since he was a child. He has gone to the [facility] last year 12 times. The exercises they gave him to work on at home increase his back and neck pain. They had him doing VOR exercises in sitting and standing, walking/spinning exercises. The doctor told him to stop these exercises due to causing increased neck and back pain. Didn't feel like they were helping anyway. Gets dizzy when he sits up in the morning. Had an angiogram done on heart but everything looked okay. He has been getting an earache and some jaw pain on the left side. He has had some TMJ issues in the past but this pain feels different. Occasionally ringing in your ears. No echo when talking.    General Information Comments PMH: arthritis, chronic back pain, HTN, depression, dizziness, chronic hearing loss R TKA, B shoulder RC issues   Fall Risk Screen   Fall screen completed by PT   Have you fallen 2 or more times in the past year? Yes   Have you fallen and had an injury in the past year? No   Is patient a fall risk? Yes   Fall screen comments Not completed today due to time constraints, will assess next visit   Abuse Screen (yes response referral indicated)   Feels Unsafe at Home or Work/School  no   Feels Threatened by Someone no   Does Anyone Try to Keep You From Having Contact with Others or Doing Things Outside Your Home? no   Physical Signs of Abuse Present no   Cognitive Status Examination   Orientation orientation to person, place and time   Level of Consciousness alert   Posture   Posture Forward head position;Protracted shoulders;Kyphosis   Range of Motion (ROM)   ROM Comment AROM B LE WNL, ankle DF limited to 5 degrees B, hamstrings 90/90: 15 degrees from 0 B    Strength   Strength Comments hip flexion 4/5 B, knee flexion R 4+/5, left 4/5, knee extension R 4+/5, L 4/5, ankle DF 4+/5 B    Gait Special Tests   Gait Special Tests DYNAMIC GAIT INDEX   Gait Special Tests Dynamic Gait Index   Score out of 24 21   Balance Special Tests   Balance Special Tests Haddad balance;Single leg stance right;Single leg stance left;Modified CTSIB Conditions;Romberg   Balance Special Tests Haddad Balance   Score out of 56 52   Balance Special Tests Single Leg Stance Left   Left, seconds 10 Seconds   Balance Special Tests Modified CTSIB Conditions   Condition 1, seconds 30 Seconds   Condition 2, seconds 30 Seconds   Condition 4, seconds 30 Seconds   Condition 5, seconds 17 Seconds   Balance Special Tests Romberg   Seconds 30 Seconds   Sensory Examination   Sensory Perception Comments burning into the feet B but no numbness, can feel rocks and light pressure in his shoes    Cervicogenic Screen   Neck ROM 50% limited rotation but no increase in symptoms    Vertebral Artery Test Normal   Oculomotor Exam   VOR Normal   VOR Cancellation Abnormal   VOR Cancellation Comments difficult to stay focused on therapists nose   Rapid Head Thrust Corrective Saccade R head thrust   Rapid Head Thrust Comments corrective saccade on 1st trial, improved on 2nd trial to no corrective saccade    Infrared Goggle Exam or Frenzel Lense Exam   Vestibular Suppressant in Last 24 Hours? No   Exam completed with Infrared Goggles   Spontaneous  Nystagmus Negative   Gaze Evoked Nystagmus Negative   Head Shake Horizontal Nystagmus Negative   Alexandrea-Hallpike (right) Negative   [place]-Hallpike (Left) Negative   HSCC Supine Roll Test (Right) Negative   HSCC Supine Roll Test (Left) Negative   Dynamic Visual Acuity (DVA)   Static Acuity (LogMar) line 7   Horizontal Head Movement at 1 Hz (LogMar) line 7   Horizontal Head Movement at 2 Hz (LogMar) line 5   DVA Comments increased dizziness    Planned Therapy Interventions   Planned Therapy Interventions balance training;gait training;neuromuscular re-education;ROM;strengthening;stretching;transfer training   Clinical Impression   Criteria for Skilled Therapeutic Interventions Met yes, treatment indicated   PT Diagnosis dizziness, weakness   Influenced by the following impairments dizzy, B LE weakness, decreased activity tolerance, muscle tightness in calves   Functional limitations due to impairments walking, standing, sit<> supine transitions, spinning, safety    Clinical Presentation Stable/Uncomplicated   Clinical Presentation Rationale clinical decision making and chart review    Clinical Decision Making (Complexity) Low complexity   Therapy Frequency 1 time/week   Predicted Duration of Therapy Intervention (days/wks) 8 weeks   Risk & Benefits of therapy have been explained Yes   Patient, Family & other staff in agreement with plan of care Yes   Clinical Impression Comments Patient is a 75 year old male who presents to PT with complaints of chronic dizziness. [facility] and worked with PT for 12 visits but symptoms never improved with VOR exercises. These exercises increased his neck and back pain and he was instructed to stop by his doctor. Primary focus of PT this episode of care will be LE strengthening, compensatory strategies, and improving proprioceptive feedback with balance to help decrease fall risk.    Education Assessment   Preferred Learning Style  Listening;Demonstration;Pictures/video   Barriers to Learning No barriers   GOALS   PT Eval Goals 1;2;3   Goal 1   Goal Identifier 1   Goal Description Patient will improve B DF ROM 10 degrees in order to improve ease of balancing with center of gravity over BARBER and improving gait.    Target Date 03/10/21   Goal 2   Goal Identifier 2   Goal Description Patient will be able to demonstrate stepping compensatory strategy 5/5 times without any LOB.    Target Date 03/10/21   Goal 3   Goal Identifier 3   Goal Description Patient will be able to complete tandem stance x 30 seconds with no LOB.    Target Date 04/07/21   Total Evaluation Time   PT Eval, Moderate Complexity Minutes (45982) 42       Felicity Monroe  PT, DPT       2/10/2021   [place] [id number] [facility]   [address] [place] [id number]  [email or web address]  Voicemail: [phone]

## 2021-02-14 ENCOUNTER — HEALTH MAINTENANCE LETTER (OUTPATIENT)
Age: 76
End: 2021-02-14

## 2021-02-17 ENCOUNTER — HOSPITAL ENCOUNTER (OUTPATIENT)
Dept: PHYSICAL THERAPY | Facility: CLINIC | Age: 76
Setting detail: THERAPIES SERIES
End: 2021-02-17
Attending: OTOLARYNGOLOGY
Payer: COMMERCIAL

## 2021-02-17 PROCEDURE — 97110 THERAPEUTIC EXERCISES: CPT | Mod: GP | Performed by: PHYSICAL THERAPIST

## 2021-02-18 ENCOUNTER — TELEPHONE (OUTPATIENT)
Dept: OTOLARYNGOLOGY | Facility: CLINIC | Age: 76
End: 2021-02-18

## 2021-02-18 ENCOUNTER — HOSPITAL ENCOUNTER (OUTPATIENT)
Dept: MRI IMAGING | Facility: CLINIC | Age: 76
Discharge: HOME | End: 2021-02-18
Attending: OTOLARYNGOLOGY | Admitting: OTOLARYNGOLOGY
Payer: COMMERCIAL

## 2021-02-18 DIAGNOSIS — H90.3 SENSORINEURAL HEARING LOSS, BILATERAL: ICD-10-CM

## 2021-02-18 DIAGNOSIS — G62.9 PERIPHERAL POLYNEUROPATHY: ICD-10-CM

## 2021-02-18 DIAGNOSIS — R26.89 IMBALANCE: ICD-10-CM

## 2021-02-18 DIAGNOSIS — R42 DIZZINESS: ICD-10-CM

## 2021-02-18 LAB
CREAT BLD-MCNC: 0.9 MG/DL (ref 0.66–1.25)
GFR SERPL CREATININE-BSD FRML MDRD: 82 ML/MIN/{1.73_M2}

## 2021-02-18 PROCEDURE — 82565 ASSAY OF CREATININE: CPT

## 2021-02-18 PROCEDURE — 70553 MRI BRAIN STEM W/O & W/DYE: CPT

## 2021-02-18 PROCEDURE — A9585 GADOBUTROL INJECTION: HCPCS | Performed by: OTOLARYNGOLOGY

## 2021-02-18 PROCEDURE — 255N000002 HC RX 255 OP 636: Performed by: OTOLARYNGOLOGY

## 2021-02-18 RX ORDER — GADOBUTROL 604.72 MG/ML
7.5 INJECTION INTRAVENOUS ONCE
Status: COMPLETED | OUTPATIENT
Start: 2021-02-18 | End: 2021-02-18

## 2021-02-18 RX ADMIN — GADOBUTROL 7.5 ML: 604.72 INJECTION INTRAVENOUS at 09:49

## 2021-02-18 NOTE — TELEPHONE ENCOUNTER
I spoke to the patient on the phone.  His MRI was negative for retrocochlear pathology.  He will continue to work with physical therapy.  If he is not improving, we can consider balance evaluation.  He also has an upcoming appointment with neurology.    Rudy Ortega MD  Department of Otolarygology-Head and Neck Surgery  [facility]

## 2021-02-24 DIAGNOSIS — F41.1 ANXIETY STATE: ICD-10-CM

## 2021-02-24 NOTE — TELEPHONE ENCOUNTER
Requested Prescriptions   Pending Prescriptions Disp Refills     LORazepam (ATIVAN) 1 MG tablet [Pharmacy Med Name: LORazepam Oral Tablet 1 MG] 90 tablet 0     Sig: TAKE 1 TABLET (1 MG) BY MOUTH 3 TIMES DAILY AS NEEDED FOR ANXIETY       There is no refill protocol information for this order

## 2021-02-25 ENCOUNTER — HOSPITAL ENCOUNTER (OUTPATIENT)
Dept: PHYSICAL THERAPY | Facility: CLINIC | Age: 76
Setting detail: THERAPIES SERIES
End: 2021-02-25
Attending: OTOLARYNGOLOGY
Payer: COMMERCIAL

## 2021-02-25 PROCEDURE — 97112 NEUROMUSCULAR REEDUCATION: CPT | Mod: GP | Performed by: PHYSICAL THERAPIST

## 2021-02-25 PROCEDURE — 97164 PT RE-EVAL EST PLAN CARE: CPT | Mod: GP | Performed by: PHYSICAL THERAPIST

## 2021-02-25 RX ORDER — LORAZEPAM 1 MG/1
1 TABLET ORAL 3 TIMES DAILY PRN
Qty: 90 TABLET | Refills: 0 | OUTPATIENT
Start: 2021-02-25

## 2021-02-25 NOTE — TELEPHONE ENCOUNTER
The 1-27-21 Rx was to last 6 months. If he took the written Rx to the pharmacy then he should call that pharmacy to get the refill.     Sahil Kc MD

## 2021-02-25 NOTE — TELEPHONE ENCOUNTER
Dusty is calling stating that his lorazepam was denied and wondering why?  He had a virtual with Dr. Kc back in December and it was discussed that when he needed refill he could just call and it would be refilled.  There is an order in there from 1/27/21 for 90 tablets with 5 refills.  Looks like it was a local print.  Dusty states he does not have a copy.      Please review and advise. Thank you..Montse Harvey

## 2021-02-25 NOTE — TELEPHONE ENCOUNTER
Routing refill request to provider for review/approval because:  Drug not on the [facility] refill protocol     Radha Vera RN

## 2021-02-25 NOTE — PROGRESS NOTES
"Dusty Ruggiero   PHYSICAL THERAPY PROGRESS NOTE  02/25/21 0800   Signing Clinician's Name / Credentials   Signing clinician's name / credentials Kris Hoenk, PT   Session Number   Session Number 3 [facility] - ok plan   Adult Goals   PT Eval Goals 1;2;3   Goal 1   Goal Identifier 1   Goal Description Patient will improve B DF ROM 10 degrees in order to improve ease of balancing with center of gravity over BARBER and improving gait.    Target Date 03/10/21   Goal 2   Goal Identifier 2   Goal Description Patient will be able to demonstrate stepping compensatory strategy 5/5 times without any LOB.    Target Date 03/10/21   Goal 3   Goal Identifier 3   Goal Description Patient will be able to complete tandem stance x 30 seconds with no LOB.    Target Date 04/07/21   Subjective Report   Subjective Report dizziness in his head still the same, changing positions lyingdown/getting up, turning in full [place], walk and spin cirlce like they had him do in PT before   Objective Measures   Objective Measures Objective Measure 1   Objective Measure 1   Details orthostatic BP supine 144/71, HR 52, sitting 154/78, HR 41??   Treatment Interventions   Interventions Therapeutic Procedure/Exercise;Infrared Goggle Exam or Frenzel Lense Exam;Neuromuscular Re-education   Neuromuscular Re-education   Neuromuscular re-ed of mvmt, balance, coord, kinesthetic sense, posture, proprioception minutes (63800) 30   Skilled Intervention balance, vestibular drills   Treatment Detail sit to stand x10 from low mat, slow fwd marching 50ft,  foam balance FA 2\" EC 2x 25sec, foam FA EO horiz head turns x10, figure 8 around cones x10, turn in place 360* - first rep really fast =got dizzy, done at normal speed min sx like head trailing behind eyes   Infrared Goggle Exam or Frenzel Lense Exam - recheck   Vestibular Suppressant in Last 24 Hours? No   Exam completed with Infrared Goggles   Spontaneous Nystagmus Negative   Gaze Evoked Nystagmus Negative   Head Shake " Horizontal Nystagmus Negative   [place]-Hallpike (right) Negative   Alexandrea-Hallpike (right) comments upon sitting up had L upward diagonal beat   Alexandrea-Hallpike (Left) Negative   [place]-Hallpike (left) comments upon sitting up had L upward diagonal beat   Plan   Home program PTRX in snap shot    Plan for next session 25min further assessing vestibular, inconclusive, probable central origin   Comments   Comments 5min assessing   Total Session Time   Timed Code Treatment Minutes 30   Total Treatment Time (sum of timed and untimed services) 50   AMBULATORY CLINICS ONLY-MEDICAL AND TREATMENT DIAGNOSIS   Medical Diagnosis asymmetrical right sensorineural hearing loss, sensorineural hearing los, bilateral, imbalance, peripheral polyneuropathy    PT Diagnosis dizziness, weakness   [facility]  Kris Hoenk  PT  [facility]  [address] [place] [address]  [email or web address]   Office: [phone]  Voicemail: [phone]

## 2021-02-26 ENCOUNTER — TELEPHONE (OUTPATIENT)
Dept: FAMILY MEDICINE | Facility: CLINIC | Age: 76
End: 2021-02-26

## 2021-02-26 NOTE — TELEPHONE ENCOUNTER
Pt called to inform that [facility] pharmacy does not have the prescription for lorazepam that Dr Kc authorized on 1/27/21.  It was a local print and should have been faxed.    I attempted to call pharmacy to confirm that it was never received and to give a verbal order but pharmacy is busy x 3 attempts.    Will need to recall later.    Clarice Watson RN

## 2021-02-26 NOTE — TELEPHONE ENCOUNTER
I spoke with Lyssa at [facility].  She can take this order verbally.    I shared order with Lyssa with read-back.    I notified pt.    Clarice Watson RN

## 2021-03-01 ENCOUNTER — PRE VISIT (OUTPATIENT)
Dept: NEUROLOGY | Facility: CLINIC | Age: 76
End: 2021-03-01

## 2021-03-01 NOTE — TELEPHONE ENCOUNTER
FUTURE VISIT INFORMATION:    Date: 3/3/21    Time:  0920    Location: [facility]   REFERRAL INFORMATION:    Referring provider:  Sahil Kc MD    Referring providers clinic:  [facility]    Reason for visit/diagnosis:  Dizziness       NOTES (FOR ALL VISITS) STATUS DETAILS   OFFICE NOTE from referring provider Printed 11/11/20   OFFICE NOTE from other specialist Printed  Dr. Gonzalez (FP) 10/28/19  Dr. Cardoza (FP) 8/17/20  Dr. iCsneros (card) 12/15/20, 1/19/21  Dr. Napier (card) 12/29/20  WILLAM Youssef (aud) 2/4/21  Dr. Ortega (ENT) 2/4/21  PT 2/10/21, 2/25/21   DISCHARGE SUMMARY from hospital       DISCHARGE REPORT from the ER Printed [facility] 8/18/20   MEDICATION LIST In Epic     IMAGING  (FOR ALL VISITS)       EMG       EEG       MRI (HEAD, NECK, SPINE) Printed  Images in PACs MRI brain 2/18/21   CARDIAC STUDIES  Printed  [facility] 8/19/20  Echo 12/23/20  Stress test 12/23/20    FORMAL COGNITIVE TESTING       OTHER

## 2021-03-02 ENCOUNTER — IMMUNIZATION (OUTPATIENT)
Dept: NURSING | Facility: CLINIC | Age: 76
End: 2021-03-02
Attending: FAMILY MEDICINE
Payer: COMMERCIAL

## 2021-03-02 PROCEDURE — 91300 PR COVID VAC PFIZER DIL RECON 30 MCG/0.3 ML IM: CPT

## 2021-03-02 PROCEDURE — 0002A PR COVID VAC PFIZER DIL RECON 30 MCG/0.3 ML IM: CPT

## 2021-03-03 ENCOUNTER — OFFICE VISIT (OUTPATIENT)
Dept: NEUROLOGY | Facility: CLINIC | Age: 76
End: 2021-03-03
Payer: COMMERCIAL

## 2021-03-03 VITALS
DIASTOLIC BLOOD PRESSURE: 78 MMHG | RESPIRATION RATE: 18 BRPM | SYSTOLIC BLOOD PRESSURE: 151 MMHG | TEMPERATURE: 98.5 F | HEART RATE: 59 BPM

## 2021-03-03 DIAGNOSIS — R42 EPISODE OF DIZZINESS: Primary | ICD-10-CM

## 2021-03-03 DIAGNOSIS — G62.9 PERIPHERAL POLYNEUROPATHY: ICD-10-CM

## 2021-03-03 PROCEDURE — 99204 OFFICE O/P NEW MOD 45 MIN: CPT | Performed by: PSYCHIATRY & NEUROLOGY

## 2021-03-03 NOTE — LETTER
3/3/2021         RE: Dusty Ruggiero  [address]  [place] [address]        Dear Colleague,    Thank you for referring your patient, Dusty Ruggiero, to the [facility] [place]. Please see a copy of my visit note below.    INITIAL NEUROLOGY CONSULTATION    DATE OF VISIT: 3/3/2021  MRN: [id number]  PATIENT NAME: Dusty Ruggiero  YOB: 1945    REFERRING PROVIDER: No ref. provider found    Chief Complaint   Patient presents with     New Patient     Dizziness.  Referral by Sahil Kc MD       SUBJECTIVE:                                                      HPI:   Dusty Ruggiero is a 75 year old male whom I have been asked by Dr. Kc to see in consultation for dizziness. Per chart review,  the patient was seen in a clinic at [facility] in 10.2019 with complaints of intermittent lightheadedness with position changes.  He did endorse increased dizziness in the setting of using cannabis for joint pain.  He has history of hypertension.  He reported worsening of the dizziness last summer.  He said it was positional, accompanied by nausea and mild headache.  No history of migraine symptoms in the past.  Previous brain MRI had been reportedly normal except for some fluid in the right mastoid.  He also reported congestion.  There is additional note of worsening anxiety, with the patient being on Zoloft and lorazepam.  At that time his symptoms were felt to be consistent with peripheral vertigo some of his anxiety medications were changed and he was set up with a Zio patch monitor to look for an arrhythmia.  Sinus CT was normal in 8.2020.  Around that time notes indicate that the patient had an appointment to be seen in [facility].  Cardiac monitoring revealed supraventricular tachycardia.  He was seen in cardiology clinic in 12.2020, mainly for uncontrolled hypertension.  An abnormal stress test was also completed in December, so  the patient has since undergone coronary angiogram.  He described then some room spinning.  ENT consultation was recommended.  Audiogram was completed on 2.4.21.  The patient was also complaining of the left ear pain as well as feeling off balance then.  That note indicates long history of  and occupational noise exposure.  When he was seen by the ENT provider he denied vertigo or spinning at that time but rather had complaints of positional lightheadedness and unsteadiness.  He also complained of chronic back issues and lower extremity paresthesias.  He told Dr. Ortega then that he [facility] him on vestibular therapy but he felt like the exercises made him worse.  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  Additional vestibular testing was recommended through the [facility].  MRI was also recommended.  This study was unremarkable.    Dusty tells me that the dizzness is positional. He says any position change can cause him to feel dizzy or off balance.  He really has not had any recent room spinning.  He does not have any nausea with his symptoms.  He is currently taking lorazepam 3 times a day.  It is unclear to him if this is helpful for the dizzy spells.  Does still continue to have some intermittent left ear pain, as discussed with ENT.  He knows that he has some TMJ problems, but mentions that this pain feels different.    He confirms that the [facility] did do additional vestibular testing, including he believes VNG.  He is not sure if they found anything from that testing.  He did 12 weeks of therapy with them.  He says part of the problem were some musculoskeletal issues, limiting which exercises he was able to do (back and joint pain).  He is currently working with our physical therapist here, and says that their exercises have been a little more doable in his case.  He has another appointment on Friday.  He is not sure if  this has been helpful for the dizziness overall yet.  He is not sure if this has been helpful for the dizziness overall yet.  He does endorse some burning in his feet, which is not entirely painful.  He notices at night and has to keep his feet out of the covers.  At the end of our visit Dusty asks about if his MRI shows any signs of dementia.  He feels like his memory is going downhill.  We will plan to follow-up as needed with regards to the dizziness.  I did tell Dusty that I would be happy to see him back for a new appointment regarding memory concerns in the future if he is interested.  Dusty understands and agrees with the plan.  We will plan to follow-up with regards to the dizziness as needed.  He does endorse some burning in his feet, which is not entirely painful.     I asked Dusty about the tremor in his hands, which he is aware of and says is not really bothersome.      Past Medical History:   Diagnosis Date     Generalized osteoarthrosis, unspecified site     Arthritis     Other anxiety states      Prostatitis, unspecified     History of Prostatitis     Ulcerative colitis, unspecified     History of Colitis     Unspecified essential hypertension      Past Surgical History:   Procedure Laterality Date     ARTHROSCOPY SHOULDER, OPEN ROTATOR CUFF REPAIR, COMBINED Right 3/4/2016    Procedure: COMBINED ARTHROSCOPY SHOULDER, OPEN ROTATOR CUFF REPAIR;  Surgeon: Wily Kan MD;  Location: [facility]      LEFT HEART CATH N/A 1/11/2021    Procedure: Left Heart Cath;  Surgeon: Mike Camara MD;  Location:  HEART CARDIAC CATH LAB     FLEXIBLE SIGMOIDOSCOPY  9/1999     PHACOEMULSIFICATION CLEAR CORNEA WITH TORIC INTRAOCULAR LENS IMPLANT Right 2/19/2018    Procedure: PHACOEMULSIFICATION CLEAR CORNEA WITH TORIC INTRAOCULAR LENS IMPLANT;  Right Cataract Removal with Implant--Toric Lens;  Surgeon: Mike Collier MD;  Location: [facility] OR     PHACOEMULSIFICATION CLEAR CORNEA WITH TORIC  INTRAOCULAR LENS IMPLANT Left 3/12/2018    Procedure: PHACOEMULSIFICATION CLEAR CORNEA WITH TORIC INTRAOCULAR LENS IMPLANT;  Left Cataract Removal with Implant--Toric lens;  Surgeon: Mike Collier MD;  Location: [facility]     SURGICAL HISTORY OF -       Colostomy  with ileostomy     SURGICAL HISTORY OF -   2000    Colostomy Reversal     SURGICAL HISTORY OF -       Left Shoulder Rotator Cuff Surgery       amLODIPine (NORVASC) 10 MG tablet, Take 1 tablet (10 mg) by mouth daily  aspirin (ASA) 81 MG EC tablet, Take 1 tablet (81 mg) by mouth daily  Biotin 36867 MCG TABS,   Cholecalciferol (VITAMIN D3 PO), Take 5,000 Units by mouth daily  Coenzyme Q10 (COQ-10 PO), Take 1 tablet by mouth daily  ferrous sulfate 325 (65 FE) MG tablet, Take 325 mg by mouth daily  LORazepam (ATIVAN) 1 MG tablet, Take 1 tablet (1 mg) by mouth 3 times daily as needed for anxiety  Naproxen Sodium (ALEVE PO), Take 220 mg by mouth Takes 3 times daily  omeprazole (PRILOSEC) 20 MG DR capsule, 1 CAPSULE by mouth daily as needed  pravastatin (PRAVACHOL) 40 MG tablet, Take 1 tablet (40 mg) by mouth every evening  psyllium (METAMUCIL) 58.6 % packet, Take 1 packet by mouth daily  sertraline (ZOLOFT) 100 MG tablet, Take 50 mg in the am, and 100 mg in the pm  Zinc 30 MG TABS,     No current facility-administered medications on file prior to visit.     Allergies   Allergen Reactions     Atenolol Shortness Of Breath     Other reaction(s): Unknown  Other reaction(s): Hard to breath     Atenolol Difficulty breathing     Rapid heart beat     Atorvastatin Muscle Pain (Myalgia)     Other reaction(s): Myalgias     Buspirone Difficulty breathing and Other (See Comments)     Other reaction(s): Unknown  Rapid heart beat     Coreg [Carvedilol] Dizziness     Gabapentin Other (See Comments)     Other reaction(s): Dizzy  Dizzy       Lyrica [Pregabalin] Other (See Comments)     Other reaction(s): dizziness  Dizzy       Benadryl [Diphenhydramine] Anxiety     Other  reaction(s): excitablity     Vistaril [Hydroxyzine] Anxiety     Other reaction(s): dizzy,HA, nausea          Problem (# of Occurrences) Relation (Name,Age of Onset)    Breast Cancer (1) Sister    Skin Cancer (1) Father       Negative family history of: Melanoma        Social History     Tobacco Use     Smoking status: Former Smoker     Packs/day: 0.25     Years: 20.00     Pack years: 5.00     Types: Cigarettes     Quit date: 1995     Years since quittin.0     Smokeless tobacco: Never Used   Substance Use Topics     Alcohol use: No     Comment: stopped a couple of months     Drug use: No       REVIEW OF SYSTEMS:                                                      He feels generally fatigued and weak.  Otherwise 10-point review of systems is negative except as mentioned above in HPI.     EXAM:                                                      Physical Exam:   Vitals: BP (!) 151/78 (BP Location: Left arm, Patient Position: Sitting, Cuff Size: Adult Regular)   Pulse (!) 41   Temp 98.5  F (36.9  C) (Tympanic)   Resp 18   BMI= There is no height or weight on file to calculate BMI.  GENERAL: NAD.  HEENT: NC/AT.   CV: RRR. S1, S2.   NECK: No bruits.  PULM: Non-labored breathing.   Neurologic:  MENTAL STATUS: Alert, attentive. Speech is fluent. Normal comprehension.  Normal concentration. Adequate fund of knowledge.   CRANIAL NERVES: Discs flat. Visual fields intact to confrontation. Pupils equally, round and reactive to light. Facial sensation and movement normal. EOM full. Hearing intact to conversation. Trapezius strength intact. Palate moves symmetrically. Tongue midline.  MOTOR: 5/5 in proximal and distal muscle groups of upper and lower extremities. Tone and bulk normal.   DTRs: Intact and symmetric in biceps, BR, patellae.  Babinski down-going bilaterally.   SENSATION: Normal light touch and pinprick. Intact proprioception left great toe, absent at right vibration: Diminished at both ankles.    COORDINATION: Normal finger nose finger. Finger tapping normal. Knee heel shin normal.  STATION AND GAIT: Romberg Negative. Good postural reflexes. Casual gait is normal.  Tandem unsteady.  TREMOR: Mild postural and action tremor in the hands bilaterally.    Right hand-dominant.    Relevant Data:  MRI Brain (2.18.21):  IMPRESSION:    1. Normal MR appearance of the internal auditory canals, labyrinthine  structures, as well as the intracranial course of the seventh and  eighth cranial nerves.   2. No evidence of acute infarct, mass, hemorrhage, or herniation.  3. Mild nonspecific white matter changes likely due to chronic  microvascular ischemic disease.     Imaging reviewed independently by me.  Agree with radiology read.      ASSESSMENT and PLAN:                                                      Assessment:     ICD-10-CM    1. Episode of dizziness  R42 CT Head Neck Angio w/o & w Contrast   2. Peripheral polyneuropathy  G62.9         Mr. Ruggiero is a pleasant 75-year-old man with history of hyperlipidemia, ulcerative colitis, DDD, hypertension, palpitations and ventricular tachycardia and BPH here for chronic intermittent dizziness.  He has had quite a work-up including brain imaging, cardiac testing and vestibular evaluations.  The cause of his dizziness is still not entirely clear.  I think it is likely multifactorial.  I explained to Dusty that the only additional test that I can think of that has not been done would be to make sure that his head and neck vessels are open and clear.  I have ordered a CTA today.  Beyond that I recommend continued follow-up with physical therapy.    We will plan to follow-up regarding the dizziness as needed.  I did tell Dsuty that I would be happy to see him back for another consultation with regards to the memory if he is interested in the future.  Dusty understands and agrees with the plan.    Dusty to follow up with Primary Care provider regarding elevated blood  pressure.    Plan:  As we discussed, I think your dizziness is likely multifactorial.  Brain MRI looks fine.  --I do recommend a CTA head/neck (vessel imaging).  We will notify you of the results.  -- Continue to work with the physical therapist.  -- Return to Neurology if symptoms worsen/change.    Total Time: 45 minutes were spent with the patient and in chart review/documentation (as described above in Assessment and Plan) /coordinating the care.    Michelle Yang MD  Neurology    CC: Sahil Kc MD        Again, thank you for allowing me to participate in the care of your patient.        Sincerely,        Michelle Yang MD

## 2021-03-03 NOTE — PROGRESS NOTES
INITIAL NEUROLOGY CONSULTATION    DATE OF VISIT: 3/3/2021  MRN: [id number]  PATIENT NAME: Dusty Ruggiero  YOB: 1945    REFERRING PROVIDER: No ref. provider found    Chief Complaint   Patient presents with     New Patient     Dizziness.  Referral by Sahil Kc MD       SUBJECTIVE:                                                      HPI:   Dusty Ruggiero is a 75 year old male whom I have been asked by Dr. Kc to see in consultation for dizziness. Per chart review,  the patient was seen in a clinic at [facility] in 10.2019 with complaints of intermittent lightheadedness with position changes.  He did endorse increased dizziness in the setting of using cannabis for joint pain.  He has history of hypertension.  He reported worsening of the dizziness last summer.  He said it was positional, accompanied by nausea and mild headache.  No history of migraine symptoms in the past.  Previous brain MRI had been reportedly normal except for some fluid in the right mastoid.  He also reported congestion.  There is additional note of worsening anxiety, with the patient being on Zoloft and lorazepam.  At that time his symptoms were felt to be consistent with peripheral vertigo some of his anxiety medications were changed and he was set up with a Zio patch monitor to look for an arrhythmia.  Sinus CT was normal in 8.2020.  Around that time notes indicate that the patient had an appointment to be seen in [facility].  Cardiac monitoring revealed supraventricular tachycardia.  He was seen in cardiology clinic in 12.2020, mainly for uncontrolled hypertension.  An abnormal stress test was also completed in December, so the patient has since undergone coronary angiogram.  He described then some room spinning.  ENT consultation was recommended.  Audiogram was completed on 2.4.21.  The patient was also complaining of the left ear pain as well as feeling off balance then.   That note indicates long history of  and occupational noise exposure.  When he was seen by the ENT provider he denied vertigo or spinning at that time but rather had complaints of positional lightheadedness and unsteadiness.  He also complained of chronic back issues and lower extremity paresthesias.  He told Dr. Ortega then that he [facility] started him on vestibular therapy but he felt like the exercises made him worse.  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  Additional vestibular testing was recommended through the [facility].  MRI was also recommended.  This study was unremarkable.    Dusty tells me that the dizzness is positional. He says any position change can cause him to feel dizzy or off balance.  He really has not had any recent room spinning.  He does not have any nausea with his symptoms.  He is currently taking lorazepam 3 times a day.  It is unclear to him if this is helpful for the dizzy spells.  Does still continue to have some intermittent left ear pain, as discussed with ENT.  He knows that he has some TMJ problems, but mentions that this pain feels different.    He confirms that the [facility] did do additional vestibular testing, including he believes VNG.  He is not sure if they found anything from that testing.  He did 12 weeks of therapy with them.  He says part of the problem were some musculoskeletal issues, limiting which exercises he was able to do (back and joint pain).  He is currently working with our physical therapist here, and says that their exercises have been a little more doable in his case.  He has another appointment on Friday.  He is not sure if this has been helpful for the dizziness overall yet.  He is not sure if this has been helpful for the dizziness overall yet.  He does endorse some burning in his feet, which is not entirely painful.  He notices at night and has to keep his feet out of the  covers.  At the end of our visit Dusty asks about if his MRI shows any signs of dementia.  He feels like his memory is going downhill.  We will plan to follow-up as needed with regards to the dizziness.  I did tell Dusty that I would be happy to see him back for a new appointment regarding memory concerns in the future if he is interested.  Dusty understands and agrees with the plan.  We will plan to follow-up with regards to the dizziness as needed.  He does endorse some burning in his feet, which is not entirely painful.     I asked Dusty about the tremor in his hands, which he is aware of and says is not really bothersome.      Past Medical History:   Diagnosis Date     Generalized osteoarthrosis, unspecified site     Arthritis     Other anxiety states      Prostatitis, unspecified     History of Prostatitis     Ulcerative colitis, unspecified     History of Colitis     Unspecified essential hypertension      Past Surgical History:   Procedure Laterality Date     ARTHROSCOPY SHOULDER, OPEN ROTATOR CUFF REPAIR, COMBINED Right 3/4/2016    Procedure: COMBINED ARTHROSCOPY SHOULDER, OPEN ROTATOR CUFF REPAIR;  Surgeon: Wily Kan MD;  Location: [facility] OR      LEFT HEART CATH N/A 1/11/2021    Procedure: Left Heart Cath;  Surgeon: Mike Camara MD;  Location:  HEART CARDIAC CATH LAB     FLEXIBLE SIGMOIDOSCOPY  9/1999     PHACOEMULSIFICATION CLEAR CORNEA WITH TORIC INTRAOCULAR LENS IMPLANT Right 2/19/2018    Procedure: PHACOEMULSIFICATION CLEAR CORNEA WITH TORIC INTRAOCULAR LENS IMPLANT;  Right Cataract Removal with Implant--Toric Lens;  Surgeon: Mike Collier MD;  Location: [facility] OR     PHACOEMULSIFICATION CLEAR CORNEA WITH TORIC INTRAOCULAR LENS IMPLANT Left 3/12/2018    Procedure: PHACOEMULSIFICATION CLEAR CORNEA WITH TORIC INTRAOCULAR LENS IMPLANT;  Left Cataract Removal with Implant--Toric lens;  Surgeon: Mike Collier MD;  Location: [facility] OR     SURGICAL HISTORY OF -        Colostomy  with ileostomy     SURGICAL HISTORY OF -   2000    Colostomy Reversal     SURGICAL HISTORY OF -       Left Shoulder Rotator Cuff Surgery       amLODIPine (NORVASC) 10 MG tablet, Take 1 tablet (10 mg) by mouth daily  aspirin (ASA) 81 MG EC tablet, Take 1 tablet (81 mg) by mouth daily  Biotin 90355 MCG TABS,   Cholecalciferol (VITAMIN D3 PO), Take 5,000 Units by mouth daily  Coenzyme Q10 (COQ-10 PO), Take 1 tablet by mouth daily  ferrous sulfate 325 (65 FE) MG tablet, Take 325 mg by mouth daily  LORazepam (ATIVAN) 1 MG tablet, Take 1 tablet (1 mg) by mouth 3 times daily as needed for anxiety  Naproxen Sodium (ALEVE PO), Take 220 mg by mouth Takes 3 times daily  omeprazole (PRILOSEC) 20 MG DR capsule, 1 CAPSULE by mouth daily as needed  pravastatin (PRAVACHOL) 40 MG tablet, Take 1 tablet (40 mg) by mouth every evening  psyllium (METAMUCIL) 58.6 % packet, Take 1 packet by mouth daily  sertraline (ZOLOFT) 100 MG tablet, Take 50 mg in the am, and 100 mg in the pm  Zinc 30 MG TABS,     No current facility-administered medications on file prior to visit.     Allergies   Allergen Reactions     Atenolol Shortness Of Breath     Other reaction(s): Unknown  Other reaction(s): Hard to breath     Atenolol Difficulty breathing     Rapid heart beat     Atorvastatin Muscle Pain (Myalgia)     Other reaction(s): Myalgias     Buspirone Difficulty breathing and Other (See Comments)     Other reaction(s): Unknown  Rapid heart beat     Coreg [Carvedilol] Dizziness     Gabapentin Other (See Comments)     Other reaction(s): Dizzy  Dizzy       Lyrica [Pregabalin] Other (See Comments)     Other reaction(s): dizziness  Dizzy       Benadryl [Diphenhydramine] Anxiety     Other reaction(s): excitablity     Vistaril [Hydroxyzine] Anxiety     Other reaction(s): dizzy,HA, nausea          Problem (# of Occurrences) Relation (Name,Age of Onset)    Breast Cancer (1) Sister    Skin Cancer (1) Father       Negative family history of: Melanoma         Social History     Tobacco Use     Smoking status: Former Smoker     Packs/day: 0.25     Years: 20.00     Pack years: 5.00     Types: Cigarettes     Quit date: 1995     Years since quittin.0     Smokeless tobacco: Never Used   Substance Use Topics     Alcohol use: No     Comment: stopped a couple of months     Drug use: No       REVIEW OF SYSTEMS:                                                      He feels generally fatigued and weak.  Otherwise 10-point review of systems is negative except as mentioned above in HPI.     EXAM:                                                      Physical Exam:   Vitals: BP (!) 151/78 (BP Location: Left arm, Patient Position: Sitting, Cuff Size: Adult Regular)   Pulse (!) 41   Temp 98.5  F (36.9  C) (Tympanic)   Resp 18   BMI= There is no height or weight on file to calculate BMI.  GENERAL: NAD.  HEENT: NC/AT.   CV: RRR. S1, S2.   NECK: No bruits.  PULM: Non-labored breathing.   Neurologic:  MENTAL STATUS: Alert, attentive. Speech is fluent. Normal comprehension.  Normal concentration. Adequate fund of knowledge.   CRANIAL NERVES: Discs flat. Visual fields intact to confrontation. Pupils equally, round and reactive to light. Facial sensation and movement normal. EOM full. Hearing intact to conversation. Trapezius strength intact. Palate moves symmetrically. Tongue midline.  MOTOR: 5/5 in proximal and distal muscle groups of upper and lower extremities. Tone and bulk normal.   DTRs: Intact and symmetric in biceps, BR, patellae.  Babinski down-going bilaterally.   SENSATION: Normal light touch and pinprick. Intact proprioception left great toe, absent at right vibration: Diminished at both ankles.   COORDINATION: Normal finger nose finger. Finger tapping normal. Knee heel shin normal.  STATION AND GAIT: Romberg Negative. Good postural reflexes. Casual gait is normal.  Tandem unsteady.  TREMOR: Mild postural and action tremor in the hands bilaterally.    Right  hand-dominant.    Relevant Data:  MRI Brain (2.18.21):  IMPRESSION:    1. Normal MR appearance of the internal auditory canals, labyrinthine  structures, as well as the intracranial course of the seventh and  eighth cranial nerves.   2. No evidence of acute infarct, mass, hemorrhage, or herniation.  3. Mild nonspecific white matter changes likely due to chronic  microvascular ischemic disease.     Imaging reviewed independently by me.  Agree with radiology read.      ASSESSMENT and PLAN:                                                      Assessment:     ICD-10-CM    1. Episode of dizziness  R42 CT Head Neck Angio w/o & w Contrast   2. Peripheral polyneuropathy  G62.9         Mr. Ruggiero is a pleasant 75-year-old man with history of hyperlipidemia, ulcerative colitis, DDD, hypertension, palpitations and ventricular tachycardia and BPH here for chronic intermittent dizziness.  He has had quite a work-up including brain imaging, cardiac testing and vestibular evaluations.  The cause of his dizziness is still not entirely clear.  I think it is likely multifactorial.  I explained to Dusty that the only additional test that I can think of that has not been done would be to make sure that his head and neck vessels are open and clear.  I have ordered a CTA today.  Beyond that I recommend continued follow-up with physical therapy.    We will plan to follow-up regarding the dizziness as needed.  I did tell Dusty that I would be happy to see him back for another consultation with regards to the memory if he is interested in the future.  Dusty understands and agrees with the plan.    Dusty to follow up with Primary Care provider regarding elevated blood pressure.    Plan:  As we discussed, I think your dizziness is likely multifactorial.  Brain MRI looks fine.  --I do recommend a CTA head/neck (vessel imaging).  We will notify you of the results.  -- Continue to work with the physical therapist.  -- Return to Neurology if  symptoms worsen/change.    Total Time: 45 minutes were spent with the patient and in chart review/documentation (as described above in Assessment and Plan) /coordinating the care.    Michelle Yang MD  Neurology    CC: Sahil Kc MD

## 2021-03-03 NOTE — PATIENT INSTRUCTIONS
Plan:  As we discussed, I think your dizziness is likely multifactorial.  Brain MRI looks fine.  --I do recommend a CTA head/neck (vessel imaging).  We will notify you of the results.  -- Continue to work with the physical therapist.  -- Return to Neurology if symptoms worsen/change.

## 2021-03-05 ENCOUNTER — HOSPITAL ENCOUNTER (OUTPATIENT)
Dept: PHYSICAL THERAPY | Facility: CLINIC | Age: 76
Setting detail: THERAPIES SERIES
End: 2021-03-05
Attending: OTOLARYNGOLOGY
Payer: COMMERCIAL

## 2021-03-05 PROCEDURE — 97112 NEUROMUSCULAR REEDUCATION: CPT | Mod: GP | Performed by: PHYSICAL THERAPIST

## 2021-03-08 NOTE — PROGRESS NOTES
Outpatient Physical Therapy Discharge Note     Patient: Dusty Ruggiero  : 1945    Beginning/End Dates of Reporting Period:  20 to 20    Referring Provider: Yuniel Dia Diagnosis: chronic LBP secondary to stenosis and spondylolisthesis    Patient did not return for follow up treatments as directed.  Goal status and current objective information is therefore unknown.  Discharge from PT services at this time for this episode of treatment. Please see attached documentation under this episode of care for further information including dates of service, start of care date, referring physician, Dx, treatment plan, treatments, etc.    Please contact me with any questions or concerns.    Thank you for your referral.    Saranya Peng, PT, DPT  Physical Therapist  [address] [address] [phone]

## 2021-03-10 ENCOUNTER — HOSPITAL ENCOUNTER (OUTPATIENT)
Dept: CT IMAGING | Facility: CLINIC | Age: 76
Discharge: HOME | End: 2021-03-10
Attending: PSYCHIATRY & NEUROLOGY | Admitting: PSYCHIATRY & NEUROLOGY
Payer: COMMERCIAL

## 2021-03-10 DIAGNOSIS — R42 EPISODE OF DIZZINESS: ICD-10-CM

## 2021-03-10 PROCEDURE — 250N000011 HC RX IP 250 OP 636: Performed by: RADIOLOGY

## 2021-03-10 PROCEDURE — 70496 CT ANGIOGRAPHY HEAD: CPT

## 2021-03-10 PROCEDURE — 250N000009 HC RX 250: Performed by: RADIOLOGY

## 2021-03-10 RX ORDER — IOPAMIDOL 755 MG/ML
70 INJECTION, SOLUTION INTRAVASCULAR ONCE
Status: COMPLETED | OUTPATIENT
Start: 2021-03-10 | End: 2021-03-10

## 2021-03-10 RX ADMIN — SODIUM CHLORIDE 100 ML: 9 INJECTION, SOLUTION INTRAVENOUS at 09:42

## 2021-03-10 RX ADMIN — IOPAMIDOL 70 ML: 755 INJECTION, SOLUTION INTRAVENOUS at 09:42

## 2021-03-11 NOTE — RESULT ENCOUNTER NOTE
Please let Dusty know that his vessel imaging does not show any significant narrowing.  No evidence for stenosis of the arteries that would be causing his dizziness.    Thank you,  Dr. Yang

## 2021-03-16 ENCOUNTER — HOSPITAL ENCOUNTER (OUTPATIENT)
Dept: PHYSICAL THERAPY | Facility: CLINIC | Age: 76
Setting detail: THERAPIES SERIES
End: 2021-03-16
Attending: PHYSICIAN ASSISTANT
Payer: COMMERCIAL

## 2021-03-16 PROCEDURE — 97140 MANUAL THERAPY 1/> REGIONS: CPT | Mod: GP | Performed by: PHYSICAL THERAPIST

## 2021-03-16 PROCEDURE — 97110 THERAPEUTIC EXERCISES: CPT | Mod: GP | Performed by: PHYSICAL THERAPIST

## 2021-03-16 NOTE — PROGRESS NOTES
Dusty Ruggiero   PHYSICAL THERAPY Progress note    03/16/21 1000   Signing Clinician's Name / Credentials   Signing clinician's name / credentials Kris Hoenk, PT   Session Number   Session Number 2 LBP [employer] to 6/16   Progress Note/Recertification   Progress Note Due Date 01/31/21   Adult Goals   PT Ortho Eval Goals 1;2;3;4   Ortho Goal 1   Goal Identifier 1   Goal Description Pt will be able to sleep 2 hours without being woken up by LBP.   (2 hours max)   Target Date 01/15/21   Date Met 03/16/21   Ortho Goal 2   Goal Identifier 2   Goal Description Pt will be able to stand >15 minutes without increase in LBP in order to decrease difficulty with ADLs.   (standing still is bad, can wlak one mile on treadmill)   Target Date 01/13/21   Ortho Goal 3   Goal Identifier 3   Goal Description Pt will be independent with HEP in order to self manage symptoms.   Target Date 01/31/21   Ortho Goal 4   Goal Identifier 4   Goal Description Pt will be able to demonstrate good TrA set, without increase in sx, in order to demonstrate improved core stability and decreased pain with yardwork   Target Date 01/31/21   Subjective Report   Subjective Report wants to resume LB treatment for 4-5 visits, potentially needing surgery in future, doing leg strengthening I gave him, used to go to [facility], LBP now 3/10   Objective Measures   Objective Measures Objective Measure 1;Objective Measure 2   Objective Measure 1   Objective Measure posture forward bent, tight ant hip, no lumbar lordosis, L lumbar curvature, some R to L TL scoliosis   Details flex 100%, extn 20% stiff   System Outcome Measures   Outcome Measures Low Back Pain (see Oswestry and Danny)   Treatment Interventions   Interventions Manual Therapy;Therapeutic Procedure/Exercise   Therapeutic Procedure/exercise   Therapeutic Procedures: strength, endurance, ROM, flexibillity minutes (48468) 15   Skilled Intervention develop HEP for improving flexibiltiy/strength    Treatment Detail SKC, LTRS, hip flexor off bed, continue LE strengthening hip exercises   Manual Therapy   Manual Therapy: Mobilization, MFR, MLD, friction massage minutes (30216) 10   Skilled Intervention MET for mobility   Treatment Detail MET pube clearing, FRSL L2 , correction neutral SB R in lumbar   Assessments Completed   Assessments Completed see eval   Plan   Plan for next session resume LB treatment 1x/wk x3 wks   Total Session Time   Timed Code Treatment Minutes 25   Total Treatment Time (sum of timed and untimed services) 25   AMBULATORY CLINICS ONLY-MEDICAL AND TREATMENT DIAGNOSIS   PT Diagnosis chronic LBP secondary to stenosis and spondylolisthesis   [facility]  Kris Hoenk  PT  [facility]  [address] [place] [address]  [email or web address]   Office: [phone]  Voicemail: [phone]

## 2021-03-23 ENCOUNTER — HOSPITAL ENCOUNTER (OUTPATIENT)
Dept: PHYSICAL THERAPY | Facility: CLINIC | Age: 76
Setting detail: THERAPIES SERIES
End: 2021-03-23
Attending: PHYSICIAN ASSISTANT
Payer: COMMERCIAL

## 2021-03-23 PROCEDURE — 97012 MECHANICAL TRACTION THERAPY: CPT | Mod: GP | Performed by: PHYSICAL THERAPIST

## 2021-03-23 PROCEDURE — 97140 MANUAL THERAPY 1/> REGIONS: CPT | Mod: GP | Performed by: PHYSICAL THERAPIST

## 2021-04-01 ENCOUNTER — HOSPITAL ENCOUNTER (OUTPATIENT)
Dept: PHYSICAL THERAPY | Facility: CLINIC | Age: 76
Setting detail: THERAPIES SERIES
End: 2021-04-01
Attending: PHYSICIAN ASSISTANT
Payer: COMMERCIAL

## 2021-04-01 PROCEDURE — 97012 MECHANICAL TRACTION THERAPY: CPT | Mod: GP,KX | Performed by: PHYSICAL THERAPIST

## 2021-04-01 PROCEDURE — 97110 THERAPEUTIC EXERCISES: CPT | Mod: GP | Performed by: PHYSICAL THERAPIST

## 2021-04-01 PROCEDURE — 97140 MANUAL THERAPY 1/> REGIONS: CPT | Mod: GP,KX | Performed by: PHYSICAL THERAPIST

## 2021-04-06 ENCOUNTER — HOSPITAL ENCOUNTER (OUTPATIENT)
Dept: PHYSICAL THERAPY | Facility: CLINIC | Age: 76
Setting detail: THERAPIES SERIES
End: 2021-04-06
Attending: PHYSICIAN ASSISTANT
Payer: COMMERCIAL

## 2021-04-06 PROCEDURE — 97110 THERAPEUTIC EXERCISES: CPT | Mod: GP,KX | Performed by: PHYSICAL THERAPIST

## 2021-04-06 PROCEDURE — 97012 MECHANICAL TRACTION THERAPY: CPT | Mod: GP,KX | Performed by: PHYSICAL THERAPIST

## 2021-05-13 NOTE — PROGRESS NOTES
GEOVANI Hussein   PHYSICAL THERAPY DISCHARGE  04/06/21 0900   Signing Clinician's Name / Credentials   Signing clinician's name / credentials Kris Hoenk, PT   Session Number   Session Number 5 LBP [employer] to 6/16  Seen from 2/10-4/6/21   Progress Note/Recertification   Progress Note Due Date 01/31/21   Adult Goals   PT Ortho Eval Goals 1;2;3;4   Ortho Goal 1   Goal Identifier 1   Goal Description Pt will be able to sleep 2 hours without being woken up by LBP.   (2 hours max)   Target Date 01/15/21   Date Met 03/16/21   Ortho Goal 2   Goal Identifier 2 still valid extend date 5/6   Goal Description Pt will be able to stand >15 minutes without increase in LBP in order to decrease difficulty with ADLs.   (standing still is bad, can walk one mile on treadmill)   Target Date 01/13/21   Ortho Goal 3   Goal Identifier 3   Goal Description Pt will be independent with HEP in order to self manage symptoms.  (knows exercise routine, still sx)   Target Date 01/31/21   Ortho Goal 4   Goal Identifier 4   Goal Description Pt will be able to demonstrate good TrA set, without increase in sx, in order to demonstrate improved core stability and decreased pain with yardwork   Target Date 01/31/21   Date Met 04/06/21   Subjective Report   Subjective Report doing yardwork, standing still is still worst, has to be moving a little   Objective Measures   Objective Measures Objective Measure 1;Objective Measure 2   Objective Measure 1   Objective Measure posture forward bent, tight ant hip, no lumbar lordosis, L lumbar curvature, some R to L TL scoliosis   Details flex 100%, extn 20% stiff   System Outcome Measures   Outcome Measures Low Back Pain (see Oswestry and Danny)   Modalities   Modalities Mechanical Traction   Mechanical Traction   Traction, Mechanical (30903) 15   Skilled Intervention spinal decompression - had hd ptx in past which was helpful   Treatment Detail PTX 84# x15min ,static trial today   Treatment  Interventions   Interventions Manual Therapy;Therapeutic Procedure/Exercise   Therapeutic Procedure/exercise   Therapeutic Procedures: strength, endurance, ROM, flexibillity minutes (37033) 8   Skilled Intervention stretches   Treatment Detail hpi flexor off bed B , standing hip flex/ext pedulum motion x20B   Plan   Plan for next session resume LB treatment 1x/wk x3 wks  Patient has not been seen in over 30 days and will be discharged at this time.  Final status not known.     Total Session Time   Timed Code Treatment Minutes 8   Total Treatment Time (sum of timed and untimed services) 23   AMBULATORY CLINICS ONLY-MEDICAL AND TREATMENT DIAGNOSIS   PT Diagnosis chronic LBP secondary to stenosis and spondylolisthesis   [facility]  Kris Hoenk  PT  [facility]  [address] [place] [address]  [email or web address]   Office: [phone]  Voicemail: [phone]

## 2021-06-30 ENCOUNTER — OFFICE VISIT (OUTPATIENT)
Dept: FAMILY MEDICINE | Facility: CLINIC | Age: 76
End: 2021-06-30
Payer: COMMERCIAL

## 2021-06-30 ENCOUNTER — HOSPITAL ENCOUNTER (OUTPATIENT)
Dept: CARDIOLOGY | Facility: CLINIC | Age: 76
Discharge: HOME | End: 2021-06-30
Attending: FAMILY MEDICINE | Admitting: FAMILY MEDICINE
Payer: COMMERCIAL

## 2021-06-30 VITALS
WEIGHT: 174 LBS | SYSTOLIC BLOOD PRESSURE: 140 MMHG | RESPIRATION RATE: 14 BRPM | OXYGEN SATURATION: 96 % | HEIGHT: 71 IN | BODY MASS INDEX: 24.36 KG/M2 | HEART RATE: 43 BPM | DIASTOLIC BLOOD PRESSURE: 73 MMHG | TEMPERATURE: 98.6 F

## 2021-06-30 DIAGNOSIS — E78.2 MIXED HYPERLIPIDEMIA: ICD-10-CM

## 2021-06-30 DIAGNOSIS — Z85.46 HISTORY OF PROSTATE CANCER: ICD-10-CM

## 2021-06-30 DIAGNOSIS — I49.9 CARDIAC ARRHYTHMIA, UNSPECIFIED CARDIAC ARRHYTHMIA TYPE: ICD-10-CM

## 2021-06-30 DIAGNOSIS — F32.5 MAJOR DEPRESSIVE DISORDER WITH SINGLE EPISODE, IN FULL REMISSION (H): ICD-10-CM

## 2021-06-30 DIAGNOSIS — Z00.00 ROUTINE HISTORY AND PHYSICAL EXAMINATION OF ADULT: Primary | ICD-10-CM

## 2021-06-30 DIAGNOSIS — F41.1 ANXIETY STATE: ICD-10-CM

## 2021-06-30 LAB
ALT SERPL W P-5'-P-CCNC: 25 U/L (ref 0–70)
CHOLEST SERPL-MCNC: 209 MG/DL
HDLC SERPL-MCNC: 96 MG/DL
LDLC SERPL CALC-MCNC: 94 MG/DL
NONHDLC SERPL-MCNC: 113 MG/DL
PSA SERPL-ACNC: 0.88 UG/L (ref 0–4)
TRIGL SERPL-MCNC: 93 MG/DL

## 2021-06-30 PROCEDURE — 84460 ALANINE AMINO (ALT) (SGPT): CPT | Performed by: FAMILY MEDICINE

## 2021-06-30 PROCEDURE — 36415 COLL VENOUS BLD VENIPUNCTURE: CPT | Performed by: FAMILY MEDICINE

## 2021-06-30 PROCEDURE — 99214 OFFICE O/P EST MOD 30 MIN: CPT | Mod: 25 | Performed by: FAMILY MEDICINE

## 2021-06-30 PROCEDURE — 93227 XTRNL ECG REC<48 HR R&I: CPT | Performed by: INTERNAL MEDICINE

## 2021-06-30 PROCEDURE — 80061 LIPID PANEL: CPT | Performed by: FAMILY MEDICINE

## 2021-06-30 PROCEDURE — 93225 XTRNL ECG REC<48 HRS REC: CPT

## 2021-06-30 PROCEDURE — 99397 PER PM REEVAL EST PAT 65+ YR: CPT | Performed by: FAMILY MEDICINE

## 2021-06-30 PROCEDURE — G0103 PSA SCREENING: HCPCS | Performed by: FAMILY MEDICINE

## 2021-06-30 RX ORDER — PRAVASTATIN SODIUM 40 MG
40 TABLET ORAL EVERY EVENING
Qty: 90 TABLET | Refills: 3 | Status: SHIPPED | OUTPATIENT
Start: 2021-06-30 | End: 2022-05-31

## 2021-06-30 RX ORDER — SERTRALINE HYDROCHLORIDE 100 MG/1
TABLET, FILM COATED ORAL
Qty: 135 TABLET | Refills: 3 | Status: SHIPPED | OUTPATIENT
Start: 2021-06-30 | End: 2021-08-30

## 2021-06-30 ASSESSMENT — MIFFLIN-ST. JEOR: SCORE: 1546.39

## 2021-06-30 ASSESSMENT — ACTIVITIES OF DAILY LIVING (ADL): CURRENT_FUNCTION: NO ASSISTANCE NEEDED

## 2021-06-30 NOTE — PROGRESS NOTES
"SUBJECTIVE:   Dsuty Ruggiero is a 75 year old male who presents for Preventive Visit.    Patient has been advised of split billing requirements and indicates understanding: Yes   Are you in the first 12 months of your Medicare coverage?  No    Healthy Habits:    In general, how would you rate your overall health?  Fair    Frequency of exercise:  6-7 days/week    Duration of exercise:  45-60 minutes    Do you usually eat at least 4 servings of fruit and vegetables a day, include whole grains    & fiber and avoid regularly eating high fat or \"junk\" foods?  Yes    Taking medications regularly:  Yes    Barriers to taking medications:  None    Medication side effects:  None    Ability to successfully perform activities of daily living:  No assistance needed    Home Safety:  No safety concerns identified    Hearing Impairment:  Difficulty following a conversation in a noisy restaurant or crowded room (right ear is diminished hearing)    In the past 6 months, have you been bothered by leaking of urine?  No    In general, how would you rate your overall mental or emotional health?  Very good      PHQ-2 Total Score:    Additional concerns today:  Yes (dark/light spot on back of right knee; big toe on right foot)    Do you feel safe in your environment? Yes    Have you ever done Advance Care Planning? (For example, a Health Directive, POLST, or a discussion with a medical provider or your loved ones about your wishes): Yes, advance care planning is on file.    Fall risk  Fallen 2 or more times in the past year?: No  Any fall with injury in the past year?: No    Cognitive Screening   1) Repeat 3 items (Leader, Season, Table)    2) Clock draw: NORMAL  3) 3 item recall: Recalls 1 object   Results: NORMAL clock, 1-2 items recalled: COGNITIVE IMPAIRMENT LESS LIKELY    Mini-CogTM Copyright JIM Juarez. Licensed by the author for use in [facility]; reprinted with permission (keyonna@.[facility]). All rights reserved.      Do you " have sleep apnea, excessive snoring or daytime drowsiness?: yes    Reviewed and updated as needed this visit by clinical staff  Tobacco  Allergies  Meds   Med Hx  Surg Hx  Fam Hx  Soc Hx        Reviewed and updated as needed this visit by Provider                Social History     Tobacco Use     Smoking status: Former Smoker     Packs/day: 0.25     Years: 20.00     Pack years: 5.00     Types: Cigarettes     Quit date: 1995     Years since quittin.4     Smokeless tobacco: Never Used   Substance Use Topics     Alcohol use: No     Comment: stopped a couple of months       No flowsheet data found.      Current providers sharing in care for this patient include:   Patient Care Team:  Sahil Kc MD as PCP - General (Family Medicine)  Sahil Kc MD as Assigned PCP  Giorgio Cisneros MD as Assigned Heart and Vascular Provider  Rudy Ortega MD as Assigned Surgical Provider  Michelle Abdi MD as Assigned Neuroscience Provider    The following health maintenance items are reviewed in Epic and correct as of today:  Health Maintenance Due   Topic Date Due     ANNUAL REVIEW OF HM ORDERS  Never done     ADVANCE CARE PLANNING  Never done     DEPRESSION ACTION PLAN  Never done     COLORECTAL CANCER SCREENING  Never done     HEPATITIS C SCREENING  Never done     ZOSTER IMMUNIZATION (1 of 2) Never done     DTAP/TDAP/TD IMMUNIZATION (2 - Td) 2008     MEDICARE ANNUAL WELLNESS VISIT  Never done     Pneumococcal Vaccine: 65+ Years (1 of 1 - PPSV23) Never done     AORTIC ANEURYSM SCREENING (SYSTEM ASSIGNED)  Never done     PHQ-9  2021     Current Outpatient Medications   Medication Instructions     amLODIPine (NORVASC) 10 mg, Oral, DAILY     aspirin (ASA) 81 mg, Oral, DAILY     Biotin 29938 MCG TABS Oral     Cholecalciferol (VITAMIN D3 PO) 5,000 Units, Oral, DAILY     Coenzyme Q10 (COQ-10 PO) 1 tablet, Oral, DAILY     ferrous sulfate (FEROSUL) 325 mg, Oral, DAILY      LORazepam (ATIVAN) 1 mg, Oral, 3 TIMES DAILY PRN     Naproxen Sodium (ALEVE PO) 220 mg, Oral, Takes 3 times daily      omeprazole (PRILOSEC) 20 MG DR capsule 1 CAPSULE by mouth daily as needed     pravastatin (PRAVACHOL) 40 mg, Oral, EVERY EVENING     psyllium (METAMUCIL) 58.6 % packet 1 packet, Oral, DAILY     sertraline (ZOLOFT) 100 MG tablet Take 50 mg in the am, and 100 mg in the pm     Zinc 30 MG TABS Oral       Patient Active Problem List   Diagnosis     Osteoarthritis     DDD (degenerative disc disease), lumbar     Degenerative arthritis of lumbar spine     Ulcerative colitis (H)     Anxiety state     Benign hypertension     Benign prostatic hyperplasia with urinary obstruction     Esophageal reflux     Hereditary and idiopathic peripheral neuropathy     Iron deficiency anemia due to chronic blood loss     Knee joint replacement by other means     Major depressive disorder with single episode, in full remission (H)     Mixed hyperlipidemia     Palpitations     Pure hypercholesterolemia     Testicular hypofunction     Vitamin D deficiency     History of colectomy     Ventricular tachycardia (H)     Abnormal stress test     NSVT (nonsustained ventricular tachycardia) (H)     Status post coronary angiogram       Review of Systems  CONSTITUTIONAL: NEGATIVE for fever, chills, change in weight  INTEGUMENTARY/SKIN: NEGATIVE for worrisome rashes, moles or lesions  EYES: NEGATIVE for vision changes or irritation  ENT/MOUTH: NEGATIVE for ear, mouth and throat problems  RESP: NEGATIVE for significant cough or SOB  CV: NEGATIVE for chest pain, palpitations or peripheral edema  GI: NEGATIVE for nausea, abdominal pain, heartburn, or change in bowel habits  : NEGATIVE for frequency, dysuria, or hematuria  MUSCULOSKELETAL: NEGATIVE for significant arthralgias or myalgia  NEURO: NEGATIVE for weakness, dizziness or paresthesias  PSYCHIATRIC: NEGATIVE for changes in mood or affect    OBJECTIVE:   BP (!) 140/73   Pulse (!) 43  "  Temp 98.6  F (37  C) (Tympanic)   Resp 14   Ht 1.803 m (5' 11\")   Wt 78.9 kg (174 lb)   SpO2 96%   BMI 24.27 kg/m   Estimated body mass index is 23.71 kg/m  as calculated from the following:    Height as of 2/4/21: 1.803 m (5' 11\").    Weight as of 2/4/21: 77.1 kg (170 lb).  Physical Exam  Exam:  GENERAL APPEARANCE: healthy, alert and no distress  EYES: EOMI,  PERRL  HENT: ear canals and TM's normal and nose and mouth without ulcers or lesions  NECK: no adenopathy, no asymmetry, masses, or scars and thyroid normal to palpation  RESP: lungs clear to auscultation - no rales, rhonchi or wheezes  CV: regular rates and rhythm, normal S1 S2, no S3 or S4 and no murmur, click or rub -  ABDOMEN:  soft, nontender, no HSM or masses and bowel sounds normal  ABDOMEN: well-healed incisions in the lower midline.   GU_male: testicles normal without atrophy or masses, no hernias and penis normal without urethral discharge  MS: extremities normal- no gross deformities noted, no evidence of inflammation in joints, FROM in all extremities.  SKIN: no suspicious lesions or rashes  NEURO: Normal strength and tone, sensory exam grossly normal, mentation intact and speech normal  PSYCH: mentation appears normal and affect normal/bright  LYMPHATICS: No axillary, cervical, inguinal, or supraclavicular nodes      ASSESSMENT / PLAN:   1. Routine history and physical examination of adult  Patient has been advised of split billing requirements and indicates understanding:   COUNSELING:  Reviewed preventive health counseling, as reflected in patient instructions       Regular exercise       Healthy diet/nutrition       Vision screening       Hearing screening    Estimated body mass index is 23.71 kg/m  as calculated from the following:    Height as of 2/4/21: 1.803 m (5' 11\").    Weight as of 2/4/21: 77.1 kg (170 lb).  He reports that he quit smoking about 26 years ago. His smoking use included cigarettes. He has a 5.00 pack-year smoking " history. He has never used smokeless tobacco.  Appropriate preventive services were discussed with this patient, including applicable screening as appropriate for cardiovascular disease, diabetes, osteopenia/osteoporosis, and glaucoma.  As appropriate for age/gender, discussed screening for colorectal cancer, prostate cancer, breast cancer, and cervical cancer. Checklist reviewing preventive services available has been given to the patient.  Reviewed patients plan of care and provided an AVS. The Basic Care Plan (routine screening as documented in Health Maintenance) for Dusty meets the Care Plan requirement. This Care Plan has been established and reviewed with the Patient.  Counseling Resources:  ATP IV Guidelines  Pooled Cohorts Equation Calculator  Breast Cancer Risk Calculator  Breast Cancer: Medication to Reduce Risk  FRAX Risk Assessment  ICSI Preventive Guidelines  Dietary Guidelines for Americans, 2010  [employer]'s MyPlate  ASA Prophylaxis  Lung CA Screening    2. Cardiac arrhythmia, unspecified cardiac arrhythmia type  We discussed the abnormal heart rhythm and this could be atrial fibrillation. Increase aspirin to 325 mg daily.   Avoid caffeine and other stimulants. Gentle exercise is recommended. Avoid heights and ladders and swimming.   Call Cardiology at [phone] or go there now to get the Holter monitor and follow up appt with the Cardiologist.   Monitor the pulse and for lightheadedness. If this is occurring then go to the nearest Emergency dept.   - Holter Monitor 24 hour Adult Pediatric; Future  - CARDIOLOGY EVAL ADULT REFERRAL; Future    3. Mixed hyperlipidemia  Use the lower chol diet and daily exercise. Refills are done today. Do the fasting labs.   - pravastatin (PRAVACHOL) 40 MG tablet; Take 1 tablet (40 mg) by mouth every evening  Dispense: 90 tablet; Refill: 3  - ALT  - Lipid panel reflex to direct LDL Fasting    4. Major depressive disorder with single episode, in full remission (H)  Use  the med and the non drug therapies. If doing well then refills are done for one year.   - sertraline (ZOLOFT) 100 MG tablet; Take 50 mg in the am, and 100 mg in the pm  Dispense: 135 tablet; Refill: 3    5. Anxiety state  See above. Avoid stimulants.   - sertraline (ZOLOFT) 100 MG tablet; Take 50 mg in the am, and 100 mg in the pm  Dispense: 135 tablet; Refill: 3    6. History of prostate cancer  Do the lab today. We will notify of the results.   - Prostate spec antigen screen      Sahil Kc MD  [facility]    Identified Health Risks:

## 2021-06-30 NOTE — LETTER
June 30, 2021      Dusty Ruggiero  [address]  [place] [address]        Dear ,    We are writing to inform you of your test results.    Your test results came back normal.   Lipids are fine. The HDL is very high and that is good.     Resulted Orders   ALT   Result Value Ref Range    ALT 25 0 - 70 U/L   Lipid panel reflex to direct LDL Fasting   Result Value Ref Range    Cholesterol 209 (H) <200 mg/dL      Comment:      Desirable:       <200 mg/dl    Triglycerides 93 <150 mg/dL      Comment:      Fasting specimen    HDL Cholesterol 96 >39 mg/dL    LDL Cholesterol Calculated 94 <100 mg/dL      Comment:      Desirable:       <100 mg/dl    Non HDL Cholesterol 113 <130 mg/dL   Prostate spec antigen screen   Result Value Ref Range    PSA 0.88 0 - 4 ug/L      Comment:      Assay Method:  Chemiluminescence using Siemens Vista analyzer   If you have any questions or concerns, please call the clinic at the number listed above.       Sincerely,      Sahil Kc MD

## 2021-06-30 NOTE — PATIENT INSTRUCTIONS
"ASSESSMENT / PLAN:   1. Routine history and physical examination of adult  Patient has been advised of split billing requirements and indicates understanding:   COUNSELING:  Reviewed preventive health counseling, as reflected in patient instructions       Regular exercise       Healthy diet/nutrition       Vision screening       Hearing screening    Estimated body mass index is 23.71 kg/m  as calculated from the following:    Height as of 2/4/21: 1.803 m (5' 11\").    Weight as of 2/4/21: 77.1 kg (170 lb).  He reports that he quit smoking about 26 years ago. His smoking use included cigarettes. He has a 5.00 pack-year smoking history. He has never used smokeless tobacco.  Appropriate preventive services were discussed with this patient, including applicable screening as appropriate for cardiovascular disease, diabetes, osteopenia/osteoporosis, and glaucoma.  As appropriate for age/gender, discussed screening for colorectal cancer, prostate cancer, breast cancer, and cervical cancer. Checklist reviewing preventive services available has been given to the patient.  Reviewed patients plan of care and provided an AVS. The Basic Care Plan (routine screening as documented in Health Maintenance) for Dusty meets the Care Plan requirement. This Care Plan has been established and reviewed with the Patient.  Counseling Resources:  ATP IV Guidelines  Pooled Cohorts Equation Calculator  Breast Cancer Risk Calculator  Breast Cancer: Medication to Reduce Risk  FRAX Risk Assessment  ICSI Preventive Guidelines  Dietary Guidelines for Americans, 2010  USDA's MyPlate  ASA Prophylaxis  Lung CA Screening    2. Cardiac arrhythmia, unspecified cardiac arrhythmia type  We discussed the abnormal heart rhythm and this could be atrial fibrillation. Increase aspirin to 325 mg daily.   Avoid caffeine and other stimulants. Gentle exercise is recommended. Avoid heights and ladders and swimming.   Call Cardiology at [phone] or go there now to " get the Holter monitor and follow up appt with the Cardiologist.   Monitor the pulse and for lightheadedness. If this is occurring then go to the nearest Emergency dept.   - Holter Monitor 24 hour Adult Pediatric; Future  - CARDIOLOGY EVAL ADULT REFERRAL; Future    3. Mixed hyperlipidemia  Use the lower chol diet and daily exercise. Refills are done today. Do the fasting labs.   - pravastatin (PRAVACHOL) 40 MG tablet; Take 1 tablet (40 mg) by mouth every evening  Dispense: 90 tablet; Refill: 3  - ALT  - Lipid panel reflex to direct LDL Fasting    4. Major depressive disorder with single episode, in full remission (H)  Use the med and the non drug therapies. If doing well then refills are done for one year.   - sertraline (ZOLOFT) 100 MG tablet; Take 50 mg in the am, and 100 mg in the pm  Dispense: 135 tablet; Refill: 3    5. Anxiety state  See above. Avoid stimulants.   - sertraline (ZOLOFT) 100 MG tablet; Take 50 mg in the am, and 100 mg in the pm  Dispense: 135 tablet; Refill: 3    6. History of prostate cancer  Do the lab today. We will notify of the results.   - Prostate spec antigen screen

## 2021-07-07 ENCOUNTER — OFFICE VISIT (OUTPATIENT)
Dept: CARDIOLOGY | Facility: CLINIC | Age: 76
End: 2021-07-07
Payer: COMMERCIAL

## 2021-07-07 VITALS
SYSTOLIC BLOOD PRESSURE: 128 MMHG | TEMPERATURE: 97 F | WEIGHT: 176.8 LBS | OXYGEN SATURATION: 98 % | HEART RATE: 40 BPM | BODY MASS INDEX: 24.66 KG/M2 | DIASTOLIC BLOOD PRESSURE: 76 MMHG

## 2021-07-07 DIAGNOSIS — I47.10 PAROXYSMAL SUPRAVENTRICULAR TACHYCARDIA (H): Primary | ICD-10-CM

## 2021-07-07 DIAGNOSIS — I25.10 CORONARY ARTERY DISEASE INVOLVING NATIVE CORONARY ARTERY OF NATIVE HEART WITHOUT ANGINA PECTORIS: ICD-10-CM

## 2021-07-07 PROCEDURE — 99215 OFFICE O/P EST HI 40 MIN: CPT | Performed by: PHYSICIAN ASSISTANT

## 2021-07-07 NOTE — LETTER
"7/7/2021    Sahil Kc MD  [address] [facility] [address]    RE: Dusty Ruggiero       Dear Colleague,    I had the pleasure of seeing Dusty Ruggiero in the [facility].    SERVICE DATE: 7/7/2021     Primary Cardiologist: Dr Cisneros    REASON FOR VISIT:  Dusty Ruggiero presents for palpitations/Holter monitor follow up. I am meeting him for the first time today.     HISTORY OF PRESENT ILLNESS:  Cardiovascular history includes CAD with known 70% lesion in the the second obtuse marginal (angio 1/2021). Given lack of classic symptoms, this was left to medical management. He also has a hx of frequent supraventricular ectopy with short runs of PSVT and 1 run of NSVT on a prior monitor. Echocardiography 12/2020 showed normal biventricular function without major valve disease. Records indicate he was on Coreg in the past, but stopped due to lightheadedness. He last saw Dr Cisneros in Jan and was felt to have non-cardiac dizziness, he recommended follow up in 1 year.     Last month he was in to see his PCP for routine follow up. It sounds like he was noted to have an \"irregular\" heart beat and his HR was recorded at 43 in the office. A 24 hour monitor was ordered and he was instructed to increase his ASA to 325mg daily and follow up in the cardiology office (no EKG done at that time).     Has ongoing dizziness but this has actually been better the last couple of weeks, sounds like he has a few different types. Some stable decreased exertional tolerance. Rare palpitations (only a few times a year).      DATA REVIEWED:    24 hour Holter from 6/30 showed SR with frequent supraventricular ectopy (14% burden), some short runs of PSVT up to 11 beats. No VT, no pauses. Ave HR 82.     ASSESSMENT AND PLAN:    1. Frequent supraventricular ectopics along with short nonsustained runs of supraventricular tachycardia. Overall he seems asymptomatic. Recent Holter " findings are consistent with his prior ZioPatch findings. It appears he was on a coreg before, but stopped due to worsening dizziness. Discussed we could trial a different BB or change amlodipine to dilt, but that I am not sure it is going to make him feel any better and that they could cause worsening dizziness. He declines at this time. If he becomes more symptomatic we could also consider an antiarrhythmic. Pt educated that BP monitors will likely not be able to correctly record his HR due to the frequent ectopy, ave HR was very normal on his prior monitors.     2. Single-vessel coronary artery disease without symptoms  -Continue ASA and statin    3. Dizziness, I agree this does not sound cardiac.     Follow up: Winter 2022 with Dr Cisneros.  Of course, the patient was encouraged to contact us sooner with questions or concerns.    48 minutes spent on the date of the encounter doing chart review, review of test results, patient visit and documentation     Christina Sprague PA-C      CURRENT MEDICATIONS:   Current Outpatient Medications   Medication Sig Dispense Refill     amLODIPine (NORVASC) 10 MG tablet Take 1 tablet (10 mg) by mouth daily 90 tablet 3     aspirin (ASA) 81 MG EC tablet Take 1 tablet (81 mg) by mouth daily       Biotin 75388 MCG TABS        Cholecalciferol (VITAMIN D3 PO) Take 5,000 Units by mouth daily       Coenzyme Q10 (COQ-10 PO) Take 1 tablet by mouth daily       ferrous sulfate 325 (65 FE) MG tablet Take 325 mg by mouth daily       LORazepam (ATIVAN) 1 MG tablet Take 1 tablet (1 mg) by mouth 3 times daily as needed for anxiety 90 tablet 5     Naproxen Sodium (ALEVE PO) Take 220 mg by mouth Takes 3 times daily       omeprazole (PRILOSEC) 20 MG DR capsule 1 CAPSULE by mouth daily as needed 90 capsule 3     pravastatin (PRAVACHOL) 40 MG tablet Take 1 tablet (40 mg) by mouth every evening 90 tablet 3     psyllium (METAMUCIL) 58.6 % packet Take 1 packet by mouth daily       sertraline (ZOLOFT)  100 MG tablet Take 50 mg in the am, and 100 mg in the pm 135 tablet 3     Zinc 30 MG TABS          ALLERGIES:  Allergies   Allergen Reactions     Atenolol Shortness Of Breath     Other reaction(s): Unknown  Other reaction(s): Hard to breath     Atenolol Difficulty breathing     Rapid heart beat     Atorvastatin Muscle Pain (Myalgia)     Other reaction(s): Myalgias     Buspirone Difficulty breathing and Other (See Comments)     Other reaction(s): Unknown  Rapid heart beat     Coreg [Carvedilol] Dizziness     Gabapentin Other (See Comments)     Other reaction(s): Dizzy  Dizzy       Lyrica [Pregabalin] Other (See Comments)     Other reaction(s): dizziness  Dizzy       Benadryl [Diphenhydramine] Anxiety     Other reaction(s): excitablity     Vistaril [Hydroxyzine] Anxiety     Other reaction(s): dizzy,HA, nausea       ROS:  Skin:  Negative     Eyes:  Negative    ENT:  Positive for tinnitus  Respiratory:  Positive for dyspnea on exertion;shortness of breath  Cardiovascular:    Positive for;dizziness  Gastroenterology: Negative    Genitourinary:  Negative    Musculoskeletal:  Positive for joint pain  Neurologic:  Negative    Psychiatric:  Positive for anxiety;depression;sleep disturbances  Heme/Lymph/Imm:  Negative    Endocrine:  Negative      PHYSICAL EXAMINATION:    GENERAL:  The patient is a pleasant 76 year old who appears their stated age.  In no apparent distress.  VITAL SIGNS:  /76   Pulse (!) 40   Temp 97  F (36.1  C) (Tympanic)   Wt 80.2 kg (176 lb 12.8 oz)   SpO2 98%   BMI 24.66 kg/m      RESPIRATORY:  Breathing is nonlabored.  Lungs are clear to auscultation bilaterally.   CARDIAC:  RRR with frequent ectopy  EXTREMITIES:  No LE edema.   NEUROLOGIC:  Alert and oriented.        Thank you for allowing me to participate in the care of your patient.      Sincerely,     Christina Sprague PA-C     [facility]  cc:   Sahil Kc MD  [id number]  [place] [address]

## 2021-07-07 NOTE — PATIENT INSTRUCTIONS
"Thank you for your visit today. If you have questions regarding your visit please contact your cardiology RNs at [phone]. Your provider has recommended the following:  Medication Changes:  Please reduce your aspirin back to 81mg (low dose) rather than the full strength one  Recommendations:  Keep an eye on your BP at home. If consistently >140/90 please make an appt to see a new PCP  Follow-up:  Cardiology follow up in Jan 2022 with Dr Cisneros    To schedule a future appointment, we kindly ask that you call cardiology scheduling at [phone] three months prior to requested revisit date.    Reminder:  For your safety, we ask that you bring in your current medication(s) or an updated list of your medications with you to EACH office visit. Include the medication name, dose of pill on bottle and how you are taking it. Include over-the-counter medications or supplements. Your provider will review this at each visit and plan your care based on your current information.   ~~~~~~~~~~~~~~~~~~~~~~~~~~~~~~~~~~~~~~~  \"[facility]\" [facility] telephone numbers for reference:  Cardiology Scheduling~[phone]  Cardiology Clinic RN's~[phone]  Diagnostic Imaging Scheduling~[phone]  Lab Scheduling~[phone]  Anticoagulation Clinic~[phone]  Cardiac Rehabilitation~[phone]  CORE Clinic RN's~[phone] (at [facility])  ~~~~~~~~~~~~~~~~~~~~~~~~~~~~~~~~~~~~~~~~      "

## 2021-08-24 ENCOUNTER — TELEPHONE (OUTPATIENT)
Dept: FAMILY MEDICINE | Facility: CLINIC | Age: 76
End: 2021-08-24

## 2021-08-24 DIAGNOSIS — F41.1 ANXIETY STATE: ICD-10-CM

## 2021-08-24 NOTE — TELEPHONE ENCOUNTER
Reason for Call:  Medication or medication refill:    Do you use a [facility]?  Name of the pharmacy and phone number for the current request:  [facility] [phone]    Name of the medication requested: lorazepam    Other request: Pt has appt Monday 8/30 to transition care. Requesting refill.    Can we leave a detailed message on this number? YES    Phone number patient can be reached at: Cell number on file:    Telephone Information:   Mobile [phone]       Best Time: any    Call taken on 8/24/2021 at 11:08 AM by Hannah Gant

## 2021-08-24 NOTE — TELEPHONE ENCOUNTER
Routing refill request to provider for review/approval because:  Drug not on the [facility] refill protocol   Last ordered by Dr. Kc  Next 5 appointments (look out 90 days)    Aug 30, 2021  9:20 AM  Office Visit with Vazquez Marquez DO  [facility] )  Arrive at: Clinic [address] [facility] [address]  [phone]         Zoraida RAMÍREZ RN, BSN

## 2021-08-25 RX ORDER — LORAZEPAM 1 MG/1
1 TABLET ORAL 3 TIMES DAILY PRN
Qty: 90 TABLET | Refills: 5 | OUTPATIENT
Start: 2021-08-25

## 2021-08-26 DIAGNOSIS — F41.1 ANXIETY STATE: ICD-10-CM

## 2021-08-26 RX ORDER — LORAZEPAM 1 MG/1
1 TABLET ORAL 3 TIMES DAILY PRN
Qty: 90 TABLET | Refills: 5 | OUTPATIENT
Start: 2021-08-26

## 2021-08-30 ENCOUNTER — OFFICE VISIT (OUTPATIENT)
Dept: FAMILY MEDICINE | Facility: CLINIC | Age: 76
End: 2021-08-30
Payer: COMMERCIAL

## 2021-08-30 VITALS
OXYGEN SATURATION: 96 % | WEIGHT: 171 LBS | BODY MASS INDEX: 23.94 KG/M2 | HEART RATE: 86 BPM | DIASTOLIC BLOOD PRESSURE: 60 MMHG | TEMPERATURE: 98.6 F | HEIGHT: 71 IN | SYSTOLIC BLOOD PRESSURE: 139 MMHG | RESPIRATION RATE: 16 BRPM

## 2021-08-30 DIAGNOSIS — F32.5 MAJOR DEPRESSIVE DISORDER WITH SINGLE EPISODE, IN FULL REMISSION (H): ICD-10-CM

## 2021-08-30 DIAGNOSIS — K51.919 ULCERATIVE COLITIS WITH COMPLICATION, UNSPECIFIED LOCATION (H): ICD-10-CM

## 2021-08-30 DIAGNOSIS — F41.1 ANXIETY STATE: ICD-10-CM

## 2021-08-30 DIAGNOSIS — B35.1 ONYCHOMYCOSIS: Primary | ICD-10-CM

## 2021-08-30 PROCEDURE — 99214 OFFICE O/P EST MOD 30 MIN: CPT | Performed by: FAMILY MEDICINE

## 2021-08-30 RX ORDER — SERTRALINE HYDROCHLORIDE 100 MG/1
TABLET, FILM COATED ORAL
Qty: 90 TABLET | Refills: 3 | Status: SHIPPED | OUTPATIENT
Start: 2021-08-30 | End: 2022-05-31

## 2021-08-30 RX ORDER — LORAZEPAM 1 MG/1
1 TABLET ORAL 2 TIMES DAILY PRN
Qty: 60 TABLET | Refills: 2 | Status: SHIPPED | OUTPATIENT
Start: 2021-08-30 | End: 2021-11-24

## 2021-08-30 RX ORDER — CICLOPIROX 80 MG/ML
SOLUTION TOPICAL
Qty: 6.6 ML | Refills: 3 | Status: SHIPPED | OUTPATIENT
Start: 2021-08-30 | End: 2022-03-01

## 2021-08-30 RX ORDER — SERTRALINE HYDROCHLORIDE 100 MG/1
TABLET, FILM COATED ORAL
Qty: 135 TABLET | Refills: 3
Start: 2021-08-30 | End: 2021-08-30

## 2021-08-30 ASSESSMENT — ANXIETY QUESTIONNAIRES
3. WORRYING TOO MUCH ABOUT DIFFERENT THINGS: SEVERAL DAYS
5. BEING SO RESTLESS THAT IT IS HARD TO SIT STILL: NOT AT ALL
6. BECOMING EASILY ANNOYED OR IRRITABLE: NOT AT ALL
2. NOT BEING ABLE TO STOP OR CONTROL WORRYING: SEVERAL DAYS
7. FEELING AFRAID AS IF SOMETHING AWFUL MIGHT HAPPEN: SEVERAL DAYS
4. TROUBLE RELAXING: SEVERAL DAYS
7. FEELING AFRAID AS IF SOMETHING AWFUL MIGHT HAPPEN: SEVERAL DAYS
GAD7 TOTAL SCORE: 6
1. FEELING NERVOUS, ANXIOUS, OR ON EDGE: MORE THAN HALF THE DAYS
GAD7 TOTAL SCORE: 6
8. IF YOU CHECKED OFF ANY PROBLEMS, HOW DIFFICULT HAVE THESE MADE IT FOR YOU TO DO YOUR WORK, TAKE CARE OF THINGS AT HOME, OR GET ALONG WITH OTHER PEOPLE?: NOT DIFFICULT AT ALL
GAD7 TOTAL SCORE: 6

## 2021-08-30 ASSESSMENT — MIFFLIN-ST. JEOR: SCORE: 1527.78

## 2021-08-30 ASSESSMENT — PATIENT HEALTH QUESTIONNAIRE - PHQ9
SUM OF ALL RESPONSES TO PHQ QUESTIONS 1-9: 1
SUM OF ALL RESPONSES TO PHQ QUESTIONS 1-9: 1
10. IF YOU CHECKED OFF ANY PROBLEMS, HOW DIFFICULT HAVE THESE PROBLEMS MADE IT FOR YOU TO DO YOUR WORK, TAKE CARE OF THINGS AT HOME, OR GET ALONG WITH OTHER PEOPLE: NOT DIFFICULT AT ALL

## 2021-08-30 NOTE — PATIENT INSTRUCTIONS
Refill provided today for the Lorazepam.     Use Penlac for toenail fungus.     Follow up in 3 months.    none

## 2021-08-30 NOTE — PROGRESS NOTES
Assessment & Plan     Major depressive disorder with single episode, in full remission (H)  Anxiety state  - Doing well. Currently on Zoloft 50 mg BID, also using Ativan 1 mg in am, 0.5 mg mid day, and 0.5 mg at night.   Has tried numerous medications in the past without success. Discussed that Lorazepam is a controlled substance and can be dangerous especially in people his age. He voices understanding and wishes to proceed with the medication. Discussed not getting medication prescribed by other providers. He voices understanding of the plan.   - sertraline (ZOLOFT) 100 MG tablet  Dispense: 90 tablet; Refill: 3  - LORazepam (ATIVAN) 1 MG tablet  Dispense: 60 tablet; Refill: 2    Ulcerative colitis with complication, unspecified location (H)  Follows with GI. Stable, no concerns    Onychomycosis  Present for years. Wishes to proceed with topical therapy although success rate for treatment is low.   - ciclopirox (PENLAC) 8 % external solution  Dispense: 6.6 mL; Refill: 3    Follow up for mood in 3 months.     Vazquez Marquez, [facility] ANNEMARIE Montano is a 76 year old who presents for the following health issues     HPI     New Patient/Transfer of Care  Anxiety Follow-Up    How are you doing with your anxiety since your last visit? No change    Are you having other symptoms that might be associated with anxiety? Yes:  without Lorazapam, nervous    Have you had a significant life event? No     Are you feeling depressed? Yes:  doing good    Do you have any concerns with your use of alcohol or other drugs? No     Has been on Lorazepam for last 21 years.   Has tried numerous medications in the past but did not tolerate them.   Currently on Zoloft 50 mg BID and also using Ativan 1 mg in AM, 0.5 mg mid day, 0.5 mg at night.   This significantly helps with his symptoms. He is not interested in changing the medication. Discussed risks of the medication especially with his age.  He  understands the risk would still like to continue with the medication.  Refill provided today.    Ulcerative Colitis.   Follows with Dr. Zee through GI.   Gets colonoscopies every 2 years.     Onychomycosis  Toenail fungus on patient's right big toe.  We will proceed with using topical ciclopirox per patient preference.  Discussed the low likelihood to be effective but would still like to try the medicine.     Social History     Tobacco Use     Smoking status: Former Smoker     Packs/day: 0.25     Years: 20.00     Pack years: 5.00     Types: Cigarettes     Quit date: 1995     Years since quittin.5     Smokeless tobacco: Never Used   Substance Use Topics     Alcohol use: No     Comment: stopped a couple of months     Drug use: No     NICOLÁS-7 SCORE 2020   Total Score - 6 (mild anxiety)   Total Score 10 6     PHQ 2020   PHQ-9 Total Score 5 1   Q9: Thoughts of better off dead/self-harm past 2 weeks Not at all Not at all     Last PHQ-9 2021   1.  Little interest or pleasure in doing things 0   2.  Feeling down, depressed, or hopeless 0   3.  Trouble falling or staying asleep, or sleeping too much 0   4.  Feeling tired or having little energy 1   5.  Poor appetite or overeating 0   6.  Feeling bad about yourself 0   7.  Trouble concentrating 0   8.  Moving slowly or restless 0   Q9: Thoughts of better off dead/self-harm past 2 weeks 0   PHQ-9 Total Score 1     NICOLÁS-7  2021   1. Feeling nervous, anxious, or on edge 2   2. Not being able to stop or control worrying 1   3. Worrying too much about different things 1   4. Trouble relaxing 1   5. Being so restless that it is hard to sit still 0   6. Becoming easily annoyed or irritable 0   7. Feeling afraid, as if something awful might happen 1   NICOLÁS-7 Total Score 6         How many servings of fruits and vegetables do you eat daily?  1-2    On average, how many sweetened beverages do you drink each day (Examples: soda, juice,  "sweet tea, etc.  Do NOT count diet or artificially sweetened beverages)?   1    How many days per week do you exercise enough to make your heart beat faster? 3 or less    How many minutes a day do you exercise enough to make your heart beat faster? 9 or less    How many days per week do you miss taking your medication? 0    Review of Systems   Constitutional, HEENT, cardiovascular, pulmonary, gi and gu systems are negative, except as otherwise noted.      Objective    /60 (BP Location: Right arm, Patient Position: Chair, Cuff Size: Adult Regular)   Pulse 86   Temp 98.6  F (37  C) (Tympanic)   Resp 16   Ht 1.803 m (5' 11\")   Wt 77.6 kg (171 lb)   SpO2 96%   BMI 23.85 kg/m    Body mass index is 23.85 kg/m .  Physical Exam   General: alert, cooperative, no acute distress   CV: RRR, no murmur  Resp: non-labored breathing, clear to auscultation, no wheezing or rales   Extremities: Right great toe with thickened yellow toenail.   Appearance: Patient appears well groomed and appropriately dressed.   Orientation: Patient alert and oriented to person, place, and time.  Behavior: Appropriate to setting and is cooperative with mental status examination. No overactivity or catatonia.  Speech: Organized, clear, and concise, with appropriate volume and tone.  Thought: Able to think abstractly. No evidence of tangentiality or circumstantiality.  Perception: No auditory or visual hallucinations. No delusions.  Mood: No evidence of euphoria or dysphoria. No lability.  Affect: Congruent to mood. Not flattened or narrowed.  Insight/Judgement: Insight and judgment intact.      Answers for HPI/ROS submitted by the patient on 8/30/2021  If you checked off any problems, how difficult have these problems made it for you to do your work, take care of things at home, or get along with other people?: Not difficult at all  PHQ9 TOTAL SCORE: 1  NICOLÁS 7 TOTAL SCORE: 6      "

## 2021-08-31 ASSESSMENT — ANXIETY QUESTIONNAIRES: GAD7 TOTAL SCORE: 6

## 2021-08-31 ASSESSMENT — PATIENT HEALTH QUESTIONNAIRE - PHQ9: SUM OF ALL RESPONSES TO PHQ QUESTIONS 1-9: 1

## 2021-09-25 ENCOUNTER — HEALTH MAINTENANCE LETTER (OUTPATIENT)
Age: 76
End: 2021-09-25

## 2021-10-22 DIAGNOSIS — K21.00 GASTROESOPHAGEAL REFLUX DISEASE WITH ESOPHAGITIS WITHOUT HEMORRHAGE: ICD-10-CM

## 2021-10-22 NOTE — TELEPHONE ENCOUNTER
Reason for Call:  Medication or medication refill:OMEPRAZOLE 20MG     Do you use a [facility]?  Name of the pharmacy and phone number for the current request:  [facility] IN [place]     Name of the medication requested: OMEPRAZOLE    Other request: HAS 5 DAYS LEFT OF PILLS    Can we leave a detailed message on this number? YES    Phone number patient can be reached at: Home number on file [phone] (home)    Best Time: ANY    Call taken on 10/22/2021 at 9:32 AM by Richelle Garcia

## 2021-11-24 ENCOUNTER — OFFICE VISIT (OUTPATIENT)
Dept: FAMILY MEDICINE | Facility: CLINIC | Age: 76
End: 2021-11-24
Payer: COMMERCIAL

## 2021-11-24 VITALS
OXYGEN SATURATION: 98 % | WEIGHT: 179 LBS | BODY MASS INDEX: 25.06 KG/M2 | RESPIRATION RATE: 16 BRPM | TEMPERATURE: 98.1 F | HEART RATE: 64 BPM | SYSTOLIC BLOOD PRESSURE: 138 MMHG | DIASTOLIC BLOOD PRESSURE: 82 MMHG | HEIGHT: 71 IN

## 2021-11-24 DIAGNOSIS — I10 BENIGN HYPERTENSION: ICD-10-CM

## 2021-11-24 DIAGNOSIS — R00.1 BRADYCARDIA: Primary | ICD-10-CM

## 2021-11-24 DIAGNOSIS — F41.1 ANXIETY STATE: ICD-10-CM

## 2021-11-24 LAB
ANION GAP SERPL CALCULATED.3IONS-SCNC: 7 MMOL/L (ref 3–14)
BUN SERPL-MCNC: 17 MG/DL (ref 7–30)
CALCIUM SERPL-MCNC: 9.3 MG/DL (ref 8.5–10.1)
CHLORIDE BLD-SCNC: 104 MMOL/L (ref 94–109)
CO2 SERPL-SCNC: 26 MMOL/L (ref 20–32)
CREAT SERPL-MCNC: 0.91 MG/DL (ref 0.66–1.25)
ERYTHROCYTE [DISTWIDTH] IN BLOOD BY AUTOMATED COUNT: 12 % (ref 10–15)
GFR SERPL CREATININE-BSD FRML MDRD: 82 ML/MIN/1.73M2
GLUCOSE BLD-MCNC: 93 MG/DL (ref 70–99)
HCT VFR BLD AUTO: 41.4 % (ref 40–53)
HGB BLD-MCNC: 13.7 G/DL (ref 13.3–17.7)
MCH RBC QN AUTO: 31.4 PG (ref 26.5–33)
MCHC RBC AUTO-ENTMCNC: 33.1 G/DL (ref 31.5–36.5)
MCV RBC AUTO: 95 FL (ref 78–100)
PLATELET # BLD AUTO: 195 10E3/UL (ref 150–450)
POTASSIUM BLD-SCNC: 3.8 MMOL/L (ref 3.4–5.3)
RBC # BLD AUTO: 4.37 10E6/UL (ref 4.4–5.9)
SODIUM SERPL-SCNC: 137 MMOL/L (ref 133–144)
WBC # BLD AUTO: 7.5 10E3/UL (ref 4–11)

## 2021-11-24 PROCEDURE — 80048 BASIC METABOLIC PNL TOTAL CA: CPT | Performed by: FAMILY MEDICINE

## 2021-11-24 PROCEDURE — 36415 COLL VENOUS BLD VENIPUNCTURE: CPT | Performed by: FAMILY MEDICINE

## 2021-11-24 PROCEDURE — 93000 ELECTROCARDIOGRAM COMPLETE: CPT | Performed by: FAMILY MEDICINE

## 2021-11-24 PROCEDURE — 99214 OFFICE O/P EST MOD 30 MIN: CPT | Performed by: FAMILY MEDICINE

## 2021-11-24 PROCEDURE — 85027 COMPLETE CBC AUTOMATED: CPT | Performed by: FAMILY MEDICINE

## 2021-11-24 RX ORDER — LORAZEPAM 1 MG/1
1 TABLET ORAL 2 TIMES DAILY PRN
Qty: 60 TABLET | Refills: 2 | Status: SHIPPED | OUTPATIENT
Start: 2021-11-24 | End: 2022-03-01

## 2021-11-24 RX ORDER — AMLODIPINE BESYLATE 10 MG/1
10 TABLET ORAL DAILY
Qty: 90 TABLET | Refills: 3 | Status: SHIPPED | OUTPATIENT
Start: 2021-11-24 | End: 2022-11-30

## 2021-11-24 ASSESSMENT — PATIENT HEALTH QUESTIONNAIRE - PHQ9: 5. POOR APPETITE OR OVEREATING: NOT AT ALL

## 2021-11-24 ASSESSMENT — MIFFLIN-ST. JEOR: SCORE: 1564.07

## 2021-11-24 ASSESSMENT — ANXIETY QUESTIONNAIRES
2. NOT BEING ABLE TO STOP OR CONTROL WORRYING: NOT AT ALL
IF YOU CHECKED OFF ANY PROBLEMS ON THIS QUESTIONNAIRE, HOW DIFFICULT HAVE THESE PROBLEMS MADE IT FOR YOU TO DO YOUR WORK, TAKE CARE OF THINGS AT HOME, OR GET ALONG WITH OTHER PEOPLE: NOT DIFFICULT AT ALL
7. FEELING AFRAID AS IF SOMETHING AWFUL MIGHT HAPPEN: NOT AT ALL
5. BEING SO RESTLESS THAT IT IS HARD TO SIT STILL: NOT AT ALL
1. FEELING NERVOUS, ANXIOUS, OR ON EDGE: NEARLY EVERY DAY
6. BECOMING EASILY ANNOYED OR IRRITABLE: NOT AT ALL
GAD7 TOTAL SCORE: 3
3. WORRYING TOO MUCH ABOUT DIFFERENT THINGS: NOT AT ALL

## 2021-11-24 NOTE — PROGRESS NOTES
Assessment & Plan     Anxiety state  Continue on Zoloft 50 mg BID.   Continue with Ativan 1 mg in AM, 0.5 mg midday, 0.5 mg in evening. Discussed trying to wean down off this medication. He reports being on the med for 20+ years. He would prefer to stay on the Ativan. Discussed risk of the medication especially with his age. He voices understanding of the risk and wishes to continue on it. Follow up in 3 months. Will provided Ativan refill at that time as long as PDMP is satisfactory.   - LORazepam (ATIVAN) 1 MG tablet  Dispense: 60 tablet; Refill: 2    Benign hypertension  Stable, needs refill of Amlodipine and provided today.   /82 today in clinic.   - amLODIPine (NORVASC) 10 MG tablet  Dispense: 90 tablet; Refill: 3  - Basic metabolic panel  (Ca, Cl, CO2, Creat, Gluc, K, Na, BUN)  - CBC with platelets    Bradycardia  Likely pulse ox without accurate reading. EKG obtained today in clinic showing PAC's, Right bundle branch block with left axis-bifascicular block.  Advised patient follow up with Cardiology for continued evaluation and cares.   -- He is asymptomatic at this time.   - Basic metabolic panel  (Ca, Cl, CO2, Creat, Gluc, K, Na, BUN)  - CBC with platelets  - EKG 12-lead complete w/read - Clinics      Follow up in 3 months for recheck of mood.     Vazquez Marquez,   [facility]    Dion Montano is a 76 year old who presents for the following health issues     HPI   76 year old male who presents to clinic for follow up of meds and mood.       Anxiety Follow-Up    How are you doing with your anxiety since your last visit? No change    Are you having other symptoms that might be associated with anxiety? Yes:  if he does not take the Lorazapam he is very nervous.     Have you had a significant life event? No     Are you feeling depressed? No    Do you have any concerns with your use of alcohol or other drugs? No     Has been on Lorazepam for last 21 years.   Has tried  numerous medications in the past but did not tolerate them.   Currently on Zoloft 50 mg BID and also using Ativan 1 mg in AM, 0.5 mg mid day, 0.5 mg at night.     This significantly helps with his symptoms. He is not interested in changing the medication. Discussed risks of the medication especially with his age.  He understands the risk would still like to continue with the medication.  Refill provided today.    Bradycardia on pulse oximeter  Has had issues for years.   Has underwent Holter monitor in the past.   Follows with Cardiology.   Currently asymptomatic with no chest pain, shortness of breath, palpitations, lightheadedness.     Social History     Tobacco Use     Smoking status: Former Smoker     Packs/day: 0.25     Years: 20.00     Pack years: 5.00     Types: Cigarettes     Quit date: 1995     Years since quittin.8     Smokeless tobacco: Never Used   Vaping Use     Vaping Use: Never used   Substance Use Topics     Alcohol use: No     Comment: stopped a couple of months     Drug use: No     NICOLÁS-7 SCORE 2020   Total Score - 6 (mild anxiety) -   Total Score 10 6 3     PHQ 2020   PHQ-9 Total Score 5 1 0   Q9: Thoughts of better off dead/self-harm past 2 weeks Not at all Not at all Not at all     Last PHQ-9 2021   1.  Little interest or pleasure in doing things 0   2.  Feeling down, depressed, or hopeless 0   3.  Trouble falling or staying asleep, or sleeping too much 0   4.  Feeling tired or having little energy 0   5.  Poor appetite or overeating 0   6.  Feeling bad about yourself 0   7.  Trouble concentrating 0   8.  Moving slowly or restless 0   Q9: Thoughts of better off dead/self-harm past 2 weeks 0   PHQ-9 Total Score 0     NICOLÁS-7  2021   1. Feeling nervous, anxious, or on edge 3   2. Not being able to stop or control worrying 0   3. Worrying too much about different things 0   4. Trouble relaxing 0   5. Being so restless that it  "is hard to sit still 0   6. Becoming easily annoyed or irritable 0   7. Feeling afraid, as if something awful might happen 0   NICOLÁS-7 Total Score 3   If you checked any problems, how difficult have they made it for you to do your work, take care of things at home, or get along with other people? Not difficult at all         How many servings of fruits and vegetables do you eat daily?  2-3    On average, how many sweetened beverages do you drink each day (Examples: soda, juice, sweet tea, etc.  Do NOT count diet or artificially sweetened beverages)?   1    How many days per week do you exercise enough to make your heart beat faster? 7    How many minutes a day do you exercise enough to make your heart beat faster? 60 or more    How many days per week do you miss taking your medication? 0      Review of Systems   Constitutional, HEENT, cardiovascular, pulmonary, gi and gu systems are negative, except as otherwise noted.      Objective    /82   Pulse 64   Temp 98.1  F (36.7  C) (Tympanic)   Resp 16   Ht 1.803 m (5' 11\")   Wt 81.2 kg (179 lb)   SpO2 98%   BMI 24.97 kg/m    Body mass index is 24.97 kg/m .  Physical Exam   General: alert, cooperative, no acute distress   CV: regular, ectopic beats present, no murmur.   Resp: non-labored breathing, clear to auscultation, no wheezing or rales   Abdomen: Soft, non-tender, no guarding.   Extremities: No peripheral edema, calves non-tender.   Psych: mood appropriate, no SI or HI. No tangential thoughts. Seems slightly anxious.     "

## 2021-11-25 ASSESSMENT — ANXIETY QUESTIONNAIRES: GAD7 TOTAL SCORE: 3

## 2021-11-25 ASSESSMENT — PATIENT HEALTH QUESTIONNAIRE - PHQ9: SUM OF ALL RESPONSES TO PHQ QUESTIONS 1-9: 0

## 2021-11-30 NOTE — PROGRESS NOTES
"  Dusty Ruggiero   PHYSICAL THERAPY DISCHARGE  03/05/21 0800   Signing Clinician's Name / Credentials   Signing clinician's name / credentials Kris Hoenk, PT   Session Number   Session Number 4 [facility] - ok plan   Adult Goals   PT Eval Goals 1;2;3   Goal 1   Goal Identifier 1   Goal Description Patient will improve B DF ROM 10 degrees in order to improve ease of balancing with center of gravity over BARBER and improving gait.    Target Date 03/10/21   Goal 2   Goal Identifier 2   Goal Description Patient will be able to demonstrate stepping compensatory strategy 5/5 times without any LOB.    Target Date 03/10/21   Goal 3   Goal Identifier 3   Goal Description Patient will be able to complete tandem stance x 30 seconds with no LOB.    Target Date 04/07/21   Subjective Report   Subjective Report shld and knee pain wakes him, needs to move during the night, this week started to try to cut down lorazepam, dizziness about same, states dizziness sx have never improved with exercises   Objective Measures   Objective Measures Objective Measure 1   Treatment Interventions   Interventions Therapeutic Procedure/Exercise;Infrared Goggle Exam or Frenzel Lense Exam;Neuromuscular Re-education   Neuromuscular Re-education   Neuromuscular re-ed of mvmt, balance, coord, kinesthetic sense, posture, proprioception minutes (58781) 25   Skilled Intervention balance, vestibular drills, finalize HEP   Treatment Detail cont with balance FT EO and FT EC with horiz and vert head turns, foam FA 2\" EC 20 sec, FA 4\" EC 30 sec   Plan   Home program PTRX in snap shot    Plan for next session continue indep with exercise program   Total Session Time   Timed Code Treatment Minutes 25   Total Treatment Time (sum of timed and untimed services) 25   AMBULATORY CLINICS ONLY-MEDICAL AND TREATMENT DIAGNOSIS   Medical Diagnosis asymmetrical right sensorineural hearing loss, sensorineural hearing los, bilateral, imbalance, peripheral polyneuropathy    PT " Diagnosis dizziness, weakness   [facility]  Kris Hoenk PT  [facility]  [address] [place] [address]  [email or web address]   Office: [phone]  Voicemail: [phone]

## 2022-01-06 NOTE — PROGRESS NOTES
CARDIOLOGY VISIT    REASON FOR VISIT: NOCAD, PAC    SUBJECTIVE:  76-year-old male seen for nonocclusive CAD.  He has hypertension and ulcerative colitis.     He recently underwent evaluation for dizziness.  ZIO monitor August 2020 through [facility] showed sinus rhythm, 12 beat run of VT, a few SVT runs up to 13 seconds.  Carvedilol was started with plans to repeat a monitor.     Echo December 2020 showed EF 60%, normal wall motion, normal RV, no valve disease.  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    Zio monitor December 2020 showed sinus rhythm, 15% PACs with runs up to 16 seconds.    Coronary angiogram January 2021 showed 70% OM 2 lesion treated medically, minimal disease elsewhere.    Holter monitor June 2021 showed sinus rhythm, 14% PACs with runs up to 11 beats.    He has been doing well recently.  He will use a treadmill at 1.8 mph, 10% incline for 10 minutes with no chest pain or shortness of breath.  He denies any lightheadedness or palpitations.  Blood pressure runs 130s or less at home.    MEDICATIONS:  Current Outpatient Medications   Medication     amLODIPine (NORVASC) 10 MG tablet     aspirin (ASA) 81 MG EC tablet     Biotin 59675 MCG TABS     Cholecalciferol (VITAMIN D3 PO)     ciclopirox (PENLAC) 8 % external solution     Coenzyme Q10 (COQ-10 PO)     ferrous sulfate 325 (65 FE) MG tablet     LORazepam (ATIVAN) 1 MG tablet     Naproxen Sodium (ALEVE PO)     omeprazole (PRILOSEC) 20 MG DR capsule     pravastatin (PRAVACHOL) 40 MG tablet     psyllium (METAMUCIL) 58.6 % packet     sertraline (ZOLOFT) 100 MG tablet     Zinc 30 MG TABS     No current facility-administered medications for this visit.       ALLERGIES:  Allergies   Allergen Reactions     Atenolol Shortness Of Breath     Other reaction(s): Unknown  Other reaction(s): Hard to breath     Atenolol Difficulty breathing     Rapid heart beat      Atorvastatin Muscle Pain (Myalgia)     Other reaction(s): Myalgias     Buspirone Difficulty breathing and Other (See Comments)     Other reaction(s): Unknown  Rapid heart beat     Coreg [Carvedilol] Dizziness     Gabapentin Other (See Comments)     Other reaction(s): Dizzy  Dizzy       Lyrica [Pregabalin] Other (See Comments)     Other reaction(s): dizziness  Dizzy       Benadryl [Diphenhydramine] Anxiety     Other reaction(s): excitablity     Diazepam Anxiety     Nausea and vomiting     Vistaril [Hydroxyzine] Anxiety     Other reaction(s): dizzy,HA, nausea         REVIEW OF SYSTEMS:  Constitutional:  No weight loss, fever, chills, weakness or fatigue.  HEENT:  Eyes:  No visual loss, blurred vision, double vision or yellow sclerae. No hearing loss, sneezing, congestion, runny nose or sore throat.  Skin:  No rash or itching.  Cardiovascular: per HPI  Respiratory: per HPI  GI:  No anorexia, nausea, vomiting or diarrhea. No abdominal pain or blood.  :  No dysurea, hematuria  Neurologic:  No headache, dizziness, syncope, paralysis, ataxia, numbness or tingling in the extremities. No change in bowel or bladder control.  Musculoskeletal:  No muscle, back pain, joint pain or stiffness.  Hematologic:  No anemia, bleeding or bruising.  Lymphatics:  No enlarged nodes. No history of splenectomy.  Psychiatric:  No history of depression or anxiety.  Endocrine:  No reports of sweating, cold or heat intolerance. No polyuria or polydipsia.  Allergies:  No history of asthma, hives, eczema or rhinitis.    PHYSICAL EXAM:  BP (!) 161/76   Pulse 60   Temp (!) 96.6  F (35.9  C)   Wt 81.5 kg (179 lb 9.6 oz)   SpO2 99%   BMI 25.05 kg/m      Constitutional: awake, alert, no distress  Eyes: PERRL, sclera nonicteric  ENT: trachea midline  Respiratory: Lungs clear  Cardiovascular: Regular rate and rhythm, no ectopy, no murmur  GI: nondistended, nontender, bowel sounds present  Lymph/Hematologic: no lymphadenopathy  Skin: dry, no  rash  Musculoskeletal: good muscle tone, strength 5/5 in upper and lower extremities  Neurologic: no focal deficits  Neuropsychiatric: appropriate affact    DATA:  Lab:   Recent Labs   Lab Test 06/30/21  1132 01/15/21  0957   CHOL 209* 189   HDL 96 79   LDL 94 85   TRIG 93 123   November 24, 2021: Potassium 3.8, creatinine 0.9    EKG, November 24, 2021: Sinus rhythm, rate 64, right bundle branch block, PACs    ASSESSMENT:  76-year-old male seen for nonocclusive CAD and PACs.  He is doing well with no concerning symptoms.  He really does not feel as PACs, therefore no treatment is warranted.  He does have a history of some lightheadedness on carvedilol.    RECOMMENDATIONS:  1.  Nonocclusive CAD  -Continue medications including pravastatin    2.  Asymptomatic PACs  -No treatment, could try diltiazem in the future if any symptoms    Follow-up in 1 year with [facility].    Timi Napier MD  Cardiology - [facility]  Pager:  [phone]  Text Page  January 11, 2022

## 2022-01-11 ENCOUNTER — OFFICE VISIT (OUTPATIENT)
Dept: CARDIOLOGY | Facility: CLINIC | Age: 77
End: 2022-01-11
Payer: COMMERCIAL

## 2022-01-11 VITALS
TEMPERATURE: 96.6 F | BODY MASS INDEX: 25.05 KG/M2 | WEIGHT: 179.6 LBS | OXYGEN SATURATION: 99 % | SYSTOLIC BLOOD PRESSURE: 161 MMHG | HEART RATE: 60 BPM | DIASTOLIC BLOOD PRESSURE: 76 MMHG

## 2022-01-11 DIAGNOSIS — I49.1 PREMATURE ATRIAL CONTRACTIONS: Primary | ICD-10-CM

## 2022-01-11 PROCEDURE — 99214 OFFICE O/P EST MOD 30 MIN: CPT | Performed by: INTERNAL MEDICINE

## 2022-01-11 NOTE — LETTER
1/11/2022    Vazquez Marquez, DO  [address] [facility] [address]    RE: Dusty Ruggiero       Dear Colleague,     I had the pleasure of seeing Dusty Ruggiero in the [facility].  CARDIOLOGY VISIT    REASON FOR VISIT: NOCAD, PAC    SUBJECTIVE:  76-year-old male seen for nonocclusive CAD.  He has hypertension and ulcerative colitis.     He recently underwent evaluation for dizziness.  ZIO monitor August 2020 through [facility] showed sinus rhythm, 12 beat run of VT, a few SVT runs up to 13 seconds.  Carvedilol was started with plans to repeat a monitor.     Echo December 2020 showed EF 60%, normal wall motion, normal RV, no valve disease.  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    Zio monitor December 2020 showed sinus rhythm, 15% PACs with runs up to 16 seconds.    Coronary angiogram January 2021 showed 70% OM 2 lesion treated medically, minimal disease elsewhere.    Holter monitor June 2021 showed sinus rhythm, 14% PACs with runs up to 11 beats.    He has been doing well recently.  He will use a treadmill at 1.8 mph, 10% incline for 10 minutes with no chest pain or shortness of breath.  He denies any lightheadedness or palpitations.  Blood pressure runs 130s or less at home.    MEDICATIONS:  Current Outpatient Medications   Medication     amLODIPine (NORVASC) 10 MG tablet     aspirin (ASA) 81 MG EC tablet     Biotin 78744 MCG TABS     Cholecalciferol (VITAMIN D3 PO)     ciclopirox (PENLAC) 8 % external solution     Coenzyme Q10 (COQ-10 PO)     ferrous sulfate 325 (65 FE) MG tablet     LORazepam (ATIVAN) 1 MG tablet     Naproxen Sodium (ALEVE PO)     omeprazole (PRILOSEC) 20 MG DR capsule     pravastatin (PRAVACHOL) 40 MG tablet     psyllium (METAMUCIL) 58.6 % packet     sertraline (ZOLOFT) 100 MG tablet     Zinc 30 MG TABS     No current facility-administered medications for this visit.        ALLERGIES:  Allergies   Allergen Reactions     Atenolol Shortness Of Breath     Other reaction(s): Unknown  Other reaction(s): Hard to breath     Atenolol Difficulty breathing     Rapid heart beat     Atorvastatin Muscle Pain (Myalgia)     Other reaction(s): Myalgias     Buspirone Difficulty breathing and Other (See Comments)     Other reaction(s): Unknown  Rapid heart beat     Coreg [Carvedilol] Dizziness     Gabapentin Other (See Comments)     Other reaction(s): Dizzy  Dizzy       Lyrica [Pregabalin] Other (See Comments)     Other reaction(s): dizziness  Dizzy       Benadryl [Diphenhydramine] Anxiety     Other reaction(s): excitablity     Diazepam Anxiety     Nausea and vomiting     Vistaril [Hydroxyzine] Anxiety     Other reaction(s): dizzy,HA, nausea         REVIEW OF SYSTEMS:  Constitutional:  No weight loss, fever, chills, weakness or fatigue.  HEENT:  Eyes:  No visual loss, blurred vision, double vision or yellow sclerae. No hearing loss, sneezing, congestion, runny nose or sore throat.  Skin:  No rash or itching.  Cardiovascular: per HPI  Respiratory: per HPI  GI:  No anorexia, nausea, vomiting or diarrhea. No abdominal pain or blood.  :  No dysurea, hematuria  Neurologic:  No headache, dizziness, syncope, paralysis, ataxia, numbness or tingling in the extremities. No change in bowel or bladder control.  Musculoskeletal:  No muscle, back pain, joint pain or stiffness.  Hematologic:  No anemia, bleeding or bruising.  Lymphatics:  No enlarged nodes. No history of splenectomy.  Psychiatric:  No history of depression or anxiety.  Endocrine:  No reports of sweating, cold or heat intolerance. No polyuria or polydipsia.  Allergies:  No history of asthma, hives, eczema or rhinitis.    PHYSICAL EXAM:  BP (!) 161/76   Pulse 60   Temp (!) 96.6  F (35.9  C)   Wt 81.5 kg (179 lb 9.6 oz)   SpO2 99%   BMI 25.05 kg/m      Constitutional: awake, alert, no distress  Eyes: PERRL, sclera nonicteric  ENT: trachea  midline  Respiratory: Lungs clear  Cardiovascular: Regular rate and rhythm, no ectopy, no murmur  GI: nondistended, nontender, bowel sounds present  Lymph/Hematologic: no lymphadenopathy  Skin: dry, no rash  Musculoskeletal: good muscle tone, strength 5/5 in upper and lower extremities  Neurologic: no focal deficits  Neuropsychiatric: appropriate affact    DATA:  Lab:   Recent Labs   Lab Test 06/30/21  1132 01/15/21  0957   CHOL 209* 189   HDL 96 79   LDL 94 85   TRIG 93 123   November 24, 2021: Potassium 3.8, creatinine 0.9    EKG, November 24, 2021: Sinus rhythm, rate 64, right bundle branch block, PACs    ASSESSMENT:  76-year-old male seen for nonocclusive CAD and PACs.  He is doing well with no concerning symptoms.  He really does not feel as PACs, therefore no treatment is warranted.  He does have a history of some lightheadedness on carvedilol.    RECOMMENDATIONS:  1.  Nonocclusive CAD  -Continue medications including pravastatin    2.  Asymptomatic PACs  -No treatment, could try diltiazem in the future if any symptoms    Follow-up in 1 year with SANTI.      Timi Napier MD  Cardiology - [facility]  Pager:  [phone]  Text Page  January 11, 2022

## 2022-02-26 ENCOUNTER — APPOINTMENT (OUTPATIENT)
Dept: MRI IMAGING | Facility: CLINIC | Age: 77
End: 2022-02-26
Attending: STUDENT IN AN ORGANIZED HEALTH CARE EDUCATION/TRAINING PROGRAM
Payer: COMMERCIAL

## 2022-02-26 ENCOUNTER — HOSPITAL ENCOUNTER (EMERGENCY)
Facility: CLINIC | Age: 77
Discharge: HOME | End: 2022-02-26
Attending: STUDENT IN AN ORGANIZED HEALTH CARE EDUCATION/TRAINING PROGRAM | Admitting: STUDENT IN AN ORGANIZED HEALTH CARE EDUCATION/TRAINING PROGRAM
Payer: COMMERCIAL

## 2022-02-26 VITALS
HEART RATE: 82 BPM | WEIGHT: 180 LBS | BODY MASS INDEX: 25.2 KG/M2 | SYSTOLIC BLOOD PRESSURE: 154 MMHG | HEIGHT: 71 IN | RESPIRATION RATE: 16 BRPM | TEMPERATURE: 97.7 F | DIASTOLIC BLOOD PRESSURE: 79 MMHG | OXYGEN SATURATION: 96 %

## 2022-02-26 DIAGNOSIS — R42 VERTIGO: ICD-10-CM

## 2022-02-26 PROBLEM — M67.911 DISORDER OF RIGHT ROTATOR CUFF: Status: ACTIVE | Noted: 2018-08-16

## 2022-02-26 LAB
ALBUMIN SERPL-MCNC: 5 G/DL (ref 3.4–5)
ALP SERPL-CCNC: 93 U/L (ref 40–150)
ALT SERPL W P-5'-P-CCNC: 22 U/L (ref 0–70)
ANION GAP SERPL CALCULATED.3IONS-SCNC: 7 MMOL/L (ref 3–14)
AST SERPL W P-5'-P-CCNC: 20 U/L (ref 0–45)
BASOPHILS # BLD AUTO: 0.1 10E3/UL (ref 0–0.2)
BASOPHILS NFR BLD AUTO: 1 %
BILIRUB SERPL-MCNC: 0.5 MG/DL (ref 0.2–1.3)
BUN SERPL-MCNC: 16 MG/DL (ref 7–30)
CALCIUM SERPL-MCNC: 9.4 MG/DL (ref 8.5–10.1)
CHLORIDE BLD-SCNC: 105 MMOL/L (ref 94–109)
CO2 SERPL-SCNC: 25 MMOL/L (ref 20–32)
CREAT SERPL-MCNC: 0.9 MG/DL (ref 0.66–1.25)
EOSINOPHIL # BLD AUTO: 0.2 10E3/UL (ref 0–0.7)
EOSINOPHIL NFR BLD AUTO: 3 %
ERYTHROCYTE [DISTWIDTH] IN BLOOD BY AUTOMATED COUNT: 12.4 % (ref 10–15)
GFR SERPL CREATININE-BSD FRML MDRD: 89 ML/MIN/1.73M2
GLUCOSE BLD-MCNC: 101 MG/DL (ref 70–99)
HCT VFR BLD AUTO: 43.7 % (ref 40–53)
HGB BLD-MCNC: 14.7 G/DL (ref 13.3–17.7)
HOLD SPECIMEN: NORMAL
IMM GRANULOCYTES # BLD: 0 10E3/UL
IMM GRANULOCYTES NFR BLD: 0 %
LYMPHOCYTES # BLD AUTO: 1.2 10E3/UL (ref 0.8–5.3)
LYMPHOCYTES NFR BLD AUTO: 15 %
MCH RBC QN AUTO: 31.2 PG (ref 26.5–33)
MCHC RBC AUTO-ENTMCNC: 33.6 G/DL (ref 31.5–36.5)
MCV RBC AUTO: 93 FL (ref 78–100)
MONOCYTES # BLD AUTO: 0.6 10E3/UL (ref 0–1.3)
MONOCYTES NFR BLD AUTO: 7 %
NEUTROPHILS # BLD AUTO: 6 10E3/UL (ref 1.6–8.3)
NEUTROPHILS NFR BLD AUTO: 74 %
NRBC # BLD AUTO: 0 10E3/UL
NRBC BLD AUTO-RTO: 0 /100
PLATELET # BLD AUTO: 199 10E3/UL (ref 150–450)
POTASSIUM BLD-SCNC: 3.7 MMOL/L (ref 3.4–5.3)
PROT SERPL-MCNC: 7.8 G/DL (ref 6.8–8.8)
RBC # BLD AUTO: 4.71 10E6/UL (ref 4.4–5.9)
SODIUM SERPL-SCNC: 137 MMOL/L (ref 133–144)
WBC # BLD AUTO: 8.1 10E3/UL (ref 4–11)

## 2022-02-26 PROCEDURE — 70544 MR ANGIOGRAPHY HEAD W/O DYE: CPT | Mod: XS

## 2022-02-26 PROCEDURE — 96360 HYDRATION IV INFUSION INIT: CPT | Mod: 59

## 2022-02-26 PROCEDURE — 96361 HYDRATE IV INFUSION ADD-ON: CPT

## 2022-02-26 PROCEDURE — 250N000013 HC RX MED GY IP 250 OP 250 PS 637: Performed by: STUDENT IN AN ORGANIZED HEALTH CARE EDUCATION/TRAINING PROGRAM

## 2022-02-26 PROCEDURE — 70553 MRI BRAIN STEM W/O & W/DYE: CPT

## 2022-02-26 PROCEDURE — 99284 EMERGENCY DEPT VISIT MOD MDM: CPT | Mod: 25 | Performed by: STUDENT IN AN ORGANIZED HEALTH CARE EDUCATION/TRAINING PROGRAM

## 2022-02-26 PROCEDURE — 255N000002 HC RX 255 OP 636: Performed by: STUDENT IN AN ORGANIZED HEALTH CARE EDUCATION/TRAINING PROGRAM

## 2022-02-26 PROCEDURE — 85025 COMPLETE CBC W/AUTO DIFF WBC: CPT | Performed by: STUDENT IN AN ORGANIZED HEALTH CARE EDUCATION/TRAINING PROGRAM

## 2022-02-26 PROCEDURE — 93005 ELECTROCARDIOGRAM TRACING: CPT

## 2022-02-26 PROCEDURE — 99285 EMERGENCY DEPT VISIT HI MDM: CPT | Mod: 25

## 2022-02-26 PROCEDURE — 82040 ASSAY OF SERUM ALBUMIN: CPT | Performed by: STUDENT IN AN ORGANIZED HEALTH CARE EDUCATION/TRAINING PROGRAM

## 2022-02-26 PROCEDURE — 36415 COLL VENOUS BLD VENIPUNCTURE: CPT | Performed by: STUDENT IN AN ORGANIZED HEALTH CARE EDUCATION/TRAINING PROGRAM

## 2022-02-26 PROCEDURE — 70549 MR ANGIOGRAPH NECK W/O&W/DYE: CPT

## 2022-02-26 PROCEDURE — 258N000003 HC RX IP 258 OP 636: Performed by: STUDENT IN AN ORGANIZED HEALTH CARE EDUCATION/TRAINING PROGRAM

## 2022-02-26 PROCEDURE — 93010 ELECTROCARDIOGRAM REPORT: CPT | Performed by: STUDENT IN AN ORGANIZED HEALTH CARE EDUCATION/TRAINING PROGRAM

## 2022-02-26 PROCEDURE — A9585 GADOBUTROL INJECTION: HCPCS | Performed by: STUDENT IN AN ORGANIZED HEALTH CARE EDUCATION/TRAINING PROGRAM

## 2022-02-26 PROCEDURE — 80053 COMPREHEN METABOLIC PANEL: CPT | Performed by: STUDENT IN AN ORGANIZED HEALTH CARE EDUCATION/TRAINING PROGRAM

## 2022-02-26 RX ORDER — GADOBUTROL 604.72 MG/ML
10 INJECTION INTRAVENOUS ONCE
Status: COMPLETED | OUTPATIENT
Start: 2022-02-26 | End: 2022-02-26

## 2022-02-26 RX ORDER — MECLIZINE HYDROCHLORIDE 25 MG/1
25 TABLET ORAL ONCE
Status: COMPLETED | OUTPATIENT
Start: 2022-02-26 | End: 2022-02-26

## 2022-02-26 RX ORDER — MECLIZINE HYDROCHLORIDE 25 MG/1
25 TABLET ORAL 3 TIMES DAILY PRN
Qty: 60 TABLET | Refills: 0 | Status: SHIPPED | OUTPATIENT
Start: 2022-02-26 | End: 2022-03-28

## 2022-02-26 RX ADMIN — SODIUM CHLORIDE 50 ML: 9 INJECTION, SOLUTION INTRAVENOUS at 15:16

## 2022-02-26 RX ADMIN — MECLIZINE HYDROCHLORIDE 25 MG: 25 TABLET ORAL at 14:24

## 2022-02-26 RX ADMIN — GADOBUTROL 10 ML: 604.72 INJECTION INTRAVENOUS at 15:16

## 2022-02-26 NOTE — ED NOTES
Pt states he woke up this morning and felt dizzy as if the room was spinning. It has continued since this morning. It is the most noticeable when standing up after having been lying down. Pt does have a history of vertigo but states this is more severe than usual.

## 2022-02-26 NOTE — ED TRIAGE NOTES
Reports awoke feeling dizzy. Dizziness worse with standing or moving head to quickly. Hx of vertigo but this is worse than usually. Is seeing cardiology on March 1st for evaluation of PVCs. Denies chest pain, HA or one sided weakness.

## 2022-02-26 NOTE — ED PROVIDER NOTES
History     Chief Complaint   Patient presents with     Dizziness     HPI  Dusty Ruggiero is a 76 year old male with past medical history which includes ulcerative colitis, anxiety, hyperlipidemia, hypertension, and nonsustained ventricular tachycardia presents department for evaluation of dizziness.  Patient explains that he has an extensive 5+ year history of dizziness previously diagnosed with vertigo.  He has tried physical therapy in the past with little improvement, does not recall medication therapy for his vertigo.  However he awoke this morning with severe dizziness which he describes as room-spinning sensation and much more severe than past experiences with dizziness.  He has had a difficult time ambulating due to the dizziness but denies fall or injury.  He is also without headache, visual changes, neck pain, word finding difficulties, extremity weakness or sensory deficits.      Allergies:  Allergies   Allergen Reactions     Atenolol Shortness Of Breath     Other reaction(s): Unknown  Other reaction(s): Hard to breath     Atenolol Difficulty breathing     Rapid heart beat     Atorvastatin Muscle Pain (Myalgia)     Other reaction(s): Myalgias     Buspirone Difficulty breathing and Other (See Comments)     Other reaction(s): Unknown  Rapid heart beat     Coreg [Carvedilol] Dizziness     Gabapentin Other (See Comments)     Other reaction(s): Dizzy  Dizzy       Lyrica [Pregabalin] Other (See Comments)     Other reaction(s): dizziness  Dizzy       Benadryl [Diphenhydramine] Anxiety     Other reaction(s): excitablity     Diazepam Anxiety     Nausea and vomiting     Vistaril [Hydroxyzine] Anxiety     Other reaction(s): dizzy,HA, nausea         Problem List:    Patient Active Problem List    Diagnosis Date Noted     Status post coronary angiogram 01/11/2021     Priority: Medium     Abnormal stress test 12/29/2020     Priority: Medium     Added automatically from request for surgery [id number]       NSVT  (nonsustained ventricular tachycardia) (H) 12/29/2020     Priority: Medium     Added automatically from request for surgery [id number]       Ventricular tachycardia (H) 11/11/2020     Priority: Medium     History of colectomy 08/21/2020     Priority: Medium     Esophageal reflux 10/17/2018     Priority: Medium     Benign hypertension 08/16/2018     Priority: Medium     Hereditary and idiopathic peripheral neuropathy 08/16/2018     Priority: Medium     Iron deficiency anemia due to chronic blood loss 08/16/2018     Priority: Medium     Major depressive disorder with single episode, in full remission (H) 08/16/2018     Priority: Medium     Started about 2017       Palpitations 08/16/2018     Priority: Medium     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     Priority: Medium     Disorder of right rotator cuff 08/16/2018     Priority: Medium     Mixed hyperlipidemia 10/02/2017     Priority: Medium     Benign prostatic hyperplasia with urinary obstruction 08/30/2013     Priority: Medium     Vitamin D deficiency 01/29/2013     Priority: Medium     Osteoarthritis 02/07/2009     Priority: Medium     DDD (degenerative disc disease), lumbar 02/07/2009     Priority: Medium     Degenerative arthritis of lumbar spine 02/07/2009     Priority: Medium     Ulcerative colitis (H) 02/07/2009     Priority: Medium     Anxiety state 12/06/2007     Priority: Medium     Knee joint replacement by other means 12/06/2007     Priority: Medium     Overview:   with Dr. Garcia 12-6-2007       Pure hypercholesterolemia 12/06/2007     Priority: Medium        Past Medical History:    Past Medical History:   Diagnosis Date     Generalized osteoarthrosis, unspecified site      Other anxiety states      Prostatitis, unspecified      Ulcerative colitis, unspecified      Unspecified essential hypertension        Past Surgical History:    Past Surgical History:   Procedure Laterality Date     ARTHROSCOPY SHOULDER, OPEN ROTATOR CUFF REPAIR, COMBINED Right 3/4/2016     Procedure: COMBINED ARTHROSCOPY SHOULDER, OPEN ROTATOR CUFF REPAIR;  Surgeon: Wily Kan MD;  Location: [facility] OR     CV LEFT HEART CATH N/A 2021    Procedure: Left Heart Cath;  Surgeon: Mike Camara MD;  Location:  HEART CARDIAC CATH LAB     FLEXIBLE SIGMOIDOSCOPY  1999     PHACOEMULSIFICATION CLEAR CORNEA WITH TORIC INTRAOCULAR LENS IMPLANT Right 2018    Procedure: PHACOEMULSIFICATION CLEAR CORNEA WITH TORIC INTRAOCULAR LENS IMPLANT;  Right Cataract Removal with Implant--Toric Lens;  Surgeon: Mike Collier MD;  Location: [facility] OR     PHACOEMULSIFICATION CLEAR CORNEA WITH TORIC INTRAOCULAR LENS IMPLANT Left 3/12/2018    Procedure: PHACOEMULSIFICATION CLEAR CORNEA WITH TORIC INTRAOCULAR LENS IMPLANT;  Left Cataract Removal with Implant--Toric lens;  Surgeon: Mike Collier MD;  Location: [facility] OR     SURGICAL HISTORY OF -       Colostomy  with ileostomy     SURGICAL HISTORY OF -   2000    Colostomy Reversal     SURGICAL HISTORY OF -       Left Shoulder Rotator Cuff Surgery       Family History:    Family History   Problem Relation Age of Onset     Skin Cancer Father      Breast Cancer Sister      Melanoma No family hx of        Social History:  Marital Status:   [2]  Social History     Tobacco Use     Smoking status: Former Smoker     Packs/day: 0.25     Years: 20.00     Pack years: 5.00     Types: Cigarettes     Quit date: 1995     Years since quittin.0     Smokeless tobacco: Never Used   Vaping Use     Vaping Use: Never used   Substance Use Topics     Alcohol use: No     Comment: stopped a couple of months     Drug use: No        Medications:    amLODIPine (NORVASC) 10 MG tablet  aspirin (ASA) 81 MG EC tablet  Biotin 96506 MCG TABS  Cholecalciferol (VITAMIN D3 PO)  ciclopirox (PENLAC) 8 % external solution  Coenzyme Q10 (COQ-10 PO)  ferrous sulfate 325 (65 FE) MG tablet  LORazepam (ATIVAN) 1 MG tablet  Naproxen Sodium (ALEVE PO)  omeprazole  "(PRILOSEC) 20 MG DR capsule  pravastatin (PRAVACHOL) 40 MG tablet  psyllium (METAMUCIL) 58.6 % packet  sertraline (ZOLOFT) 100 MG tablet  Zinc 30 MG TABS          Review of Systems  Constitutional:  Negative for fever or recent illness.  Cardiovascular:  Negative for palpitations or chest discomfort.  Respiratory:  Negative for cough cough or shortness of breath.   Gastrointestinal:  Negative for abdominal pain, nausea or vomiting.   Musculoskeletal: Negative for neck or back pain.  Denies recent injury.  Neurological: Positive for dizziness.  Negative for headache, extremity weakness or sensory deficits.    All others reviewed and are negative.      Physical Exam   BP: (!) 169/64  Pulse: 54  Temp: 97.7  F (36.5  C)  Resp: 14  Height: 180.3 cm (5' 11\")  Weight: 81.6 kg (180 lb)  SpO2: 96 %      Physical Exam  Constitutional:  Well developed, well nourished.  Appears nontoxic and in no acute distress.  Resting comfortably on the gurney.  HENT:  Normocephalic and atraumatic.  Symmetric in appearance.  Eyes:  Conjunctivae are normal.  Negative visual field deficit.  EOMI without diplopia.  PERRL.  Positive horizontal beating nystagmus.    Neck:  Neck supple.  Cardiovascular:  No cyanosis.  RRR.  No audible murmurs noted.   Respiratory:  Effort normal without sign of respiratory distress.  No audible wheezing or stridor.  Gastrointestinal:  Soft nondistended abdomen.  Nontender and without guarding.  No rigidity or rebound tenderness.  Negative Kerns's sign.  Negative McBurney's point.   Musculoskeletal:  Moves extremities spontaneously.  Finger-nose-finger intact and equal bilaterally.  Negative for dysdiadochokinesia with rapid alternating movement of fingers.  Neurological:  Patient is alert.  Skin:  Skin is warm and dry.  Psychiatric:  Normal mood and affect.      ED Course                 Procedures                EKG Interpretation:      Interpreted by: Javier Nicholson  Time reviewed: Upon completion    Symptoms at " time of EKG: Dizziness   Rhythm: Sinus  Rate: Normal  Axis: Left  Conduction: RBBB  ST Segments/ T Waves: No pathologic ST-elevations or T-wave abnormalities.  Q Waves: None  Comparison to prior: Similar morphology to previous     Clinical Impression: No sign of ischemia         Critical Care time:  none               Results for orders placed or performed during the hospital encounter of 02/26/22 (from the past 24 hour(s))   CBC with platelets differential    Narrative    The following orders were created for panel order CBC with platelets differential.  Procedure                               Abnormality         Status                     ---------                               -----------         ------                     CBC with platelets and d...[[id number]]                      Final result                 Please view results for these tests on the individual orders.   Comprehensive metabolic panel   Result Value Ref Range    Sodium 137 133 - 144 mmol/L    Potassium 3.7 3.4 - 5.3 mmol/L    Chloride 105 94 - 109 mmol/L    Carbon Dioxide (CO2) 25 20 - 32 mmol/L    Anion Gap 7 3 - 14 mmol/L    Urea Nitrogen 16 7 - 30 mg/dL    Creatinine 0.90 0.66 - 1.25 mg/dL    Calcium 9.4 8.5 - 10.1 mg/dL    Glucose 101 (H) 70 - 99 mg/dL    Alkaline Phosphatase 93 40 - 150 U/L    AST 20 0 - 45 U/L    ALT 22 0 - 70 U/L    Protein Total 7.8 6.8 - 8.8 g/dL    Albumin 5.0 3.4 - 5.0 g/dL    Bilirubin Total 0.5 0.2 - 1.3 mg/dL    GFR Estimate 89 >60 mL/min/1.73m2   CBC with platelets and differential   Result Value Ref Range    WBC Count 8.1 4.0 - 11.0 10e3/uL    RBC Count 4.71 4.40 - 5.90 10e6/uL    Hemoglobin 14.7 13.3 - 17.7 g/dL    Hematocrit 43.7 40.0 - 53.0 %    MCV 93 78 - 100 fL    MCH 31.2 26.5 - 33.0 pg    MCHC 33.6 31.5 - 36.5 g/dL    RDW 12.4 10.0 - 15.0 %    Platelet Count 199 150 - 450 10e3/uL    % Neutrophils 74 %    % Lymphocytes 15 %    % Monocytes 7 %    % Eosinophils 3 %    % Basophils 1 %    % Immature  Granulocytes 0 %    NRBCs per 100 WBC 0 <1 /100    Absolute Neutrophils 6.0 1.6 - 8.3 10e3/uL    Absolute Lymphocytes 1.2 0.8 - 5.3 10e3/uL    Absolute Monocytes 0.6 0.0 - 1.3 10e3/uL    Absolute Eosinophils 0.2 0.0 - 0.7 10e3/uL    Absolute Basophils 0.1 0.0 - 0.2 10e3/uL    Absolute Immature Granulocytes 0.0 <=0.4 10e3/uL    Absolute NRBCs 0.0 10e3/uL       Medications   gadobutrol (GADAVIST) injection 10 mL (has no administration in time range)   0.9% sodium chloride BOLUS (has no administration in time range)   meclizine (ANTIVERT) tablet 25 mg (25 mg Oral Given 2/26/22 1424)       Assessments & Plan (with Medical Decision Making)   Dusty Ruggiero is a 76 year old male who presents to the department for evaluation of dizziness described as room spinning sensation and consistent with his several year history of previously diagnosed vertigo, although today's symptoms he says are much more severe than past.  In the department he has some reproducible vertigo with laying back and turning head but not necessarily with standing or ambulation.  Examination reveals unilateral horizontal beating nystagmus with with negative test of skew, no visual field deficits or complaint of diplopia.  Negative for ataxia or dysdiadochokinesia.  Patient was given meclizine for symptoms and MRI/MRA ordered to rule out acute cause of symptoms.  Patient signed out to evening physician Dr. Varela for imaging review, reevaluation and final disposition which is likely discharged with outpatient follow-up.      Disclaimer:  This note consists of symbols derived from keyboarding, dictation, and/or voice recognition software.  As a result, there may be errors in the script that have gone undetected.  Please consider this when interpreting information found in the chart.        I have reviewed the nursing notes.    I have reviewed the findings, diagnosis, plan and need for follow up with the patient.       New Prescriptions    No medications  on file       Final diagnoses:   Vertigo       2/26/2022   [facility] EMERGENCY DEPT     Javier Nicholson DO  02/27/22 0904

## 2022-02-26 NOTE — DISCHARGE INSTRUCTIONS
Dr. Nancy Johnson [email or web address] video half somersvern maneuver    Epley's maneuvers    Meclizine    Follow-up primary care regarding physical therapy    Return here for focal neurologic change or any other concern.

## 2022-03-01 ENCOUNTER — OFFICE VISIT (OUTPATIENT)
Dept: FAMILY MEDICINE | Facility: CLINIC | Age: 77
End: 2022-03-01
Payer: COMMERCIAL

## 2022-03-01 VITALS
TEMPERATURE: 98.7 F | HEART RATE: 86 BPM | WEIGHT: 179 LBS | OXYGEN SATURATION: 99 % | SYSTOLIC BLOOD PRESSURE: 136 MMHG | HEIGHT: 71 IN | RESPIRATION RATE: 20 BRPM | DIASTOLIC BLOOD PRESSURE: 78 MMHG | BODY MASS INDEX: 25.06 KG/M2

## 2022-03-01 DIAGNOSIS — F32.5 MAJOR DEPRESSIVE DISORDER WITH SINGLE EPISODE, IN FULL REMISSION (H): ICD-10-CM

## 2022-03-01 DIAGNOSIS — F41.1 ANXIETY STATE: Primary | ICD-10-CM

## 2022-03-01 DIAGNOSIS — I47.10 PAROXYSMAL SUPRAVENTRICULAR TACHYCARDIA (H): ICD-10-CM

## 2022-03-01 DIAGNOSIS — R42 DIZZINESS: ICD-10-CM

## 2022-03-01 PROCEDURE — 99214 OFFICE O/P EST MOD 30 MIN: CPT | Performed by: FAMILY MEDICINE

## 2022-03-01 RX ORDER — LORAZEPAM 1 MG/1
0.5 TABLET ORAL 3 TIMES DAILY PRN
Qty: 45 TABLET | Refills: 2 | Status: SHIPPED | OUTPATIENT
Start: 2022-03-01 | End: 2022-05-31

## 2022-03-01 ASSESSMENT — PATIENT HEALTH QUESTIONNAIRE - PHQ9
5. POOR APPETITE OR OVEREATING: NOT AT ALL
SUM OF ALL RESPONSES TO PHQ QUESTIONS 1-9: 1

## 2022-03-01 ASSESSMENT — ANXIETY QUESTIONNAIRES
GAD7 TOTAL SCORE: 3
3. WORRYING TOO MUCH ABOUT DIFFERENT THINGS: NOT AT ALL
IF YOU CHECKED OFF ANY PROBLEMS ON THIS QUESTIONNAIRE, HOW DIFFICULT HAVE THESE PROBLEMS MADE IT FOR YOU TO DO YOUR WORK, TAKE CARE OF THINGS AT HOME, OR GET ALONG WITH OTHER PEOPLE: SOMEWHAT DIFFICULT
7. FEELING AFRAID AS IF SOMETHING AWFUL MIGHT HAPPEN: NOT AT ALL
5. BEING SO RESTLESS THAT IT IS HARD TO SIT STILL: NOT AT ALL
2. NOT BEING ABLE TO STOP OR CONTROL WORRYING: NOT AT ALL
6. BECOMING EASILY ANNOYED OR IRRITABLE: NOT AT ALL
1. FEELING NERVOUS, ANXIOUS, OR ON EDGE: NEARLY EVERY DAY

## 2022-03-01 ASSESSMENT — PAIN SCALES - GENERAL: PAINLEVEL: NO PAIN (0)

## 2022-03-01 NOTE — PROGRESS NOTES
Assessment & Plan     Anxiety state  Major depressive disorder with single episode, in full remission (H)  - LORazepam (ATIVAN) 1 MG tablet  Dispense: 45 tablet; Refill: 2  --Sertraline 50 mg in AM, 50 mg in PM.   Has been on Lorazepam for last 21 years.   Has tried numerous medications in the past but did not tolerate them.   Currently on Zoloft 50 mg BID and also  Ativan 0.5 mg TID. Risks, benefits discussed with patient. He wishes to continue on current regimen at this time.     Dizziness  Improved, almost resolved. Using meclizine which is helpful. Imaging and lab work reviewed with patient.     Paroxysmal supraventricular tachycardia (H)  Stable, follows with Cardiology.       Follow up in 3 months or sooner as needed.     Vazquez Marquez,   [facility] ANNEMARIE Montano is a 76 year old who presents for the following health issues  accompanied by his self.    HPI   76 year old male who presents to clinic for recheck of anxiety.       Anxiety Follow-Up    How are you doing with your anxiety since your last visit? No change    Are you having other symptoms that might be associated with anxiety? No    Have you had a significant life event? No     Are you feeling depressed? No    Do you have any concerns with your use of alcohol or other drugs? No     Sertraline 50 mg in AM, 50 mg in PM.   Has been on Lorazepam for last 21 years.   Has tried numerous medications in the past but did not tolerate them.   Currently on Zoloft 50 mg BID and also  Ativan 0.5 mg TID. This was decreased from 1 mg in AM, and 0.5 mg mid day and PM    This significantly helps with his symptoms. He is not interested in changing the medication. Discussed risks of the medication especially with his age.  He understands the risk would still like to continue with the medication.  Refill provided today.    Recent ER visit follow up for dizziness  This has improved and almost resolved. Using meclizine which is helping.    Reviewed lab tests and imaging with patient today in clinic.       Social History     Tobacco Use     Smoking status: Former Smoker     Packs/day: 0.25     Years: 20.00     Pack years: 5.00     Types: Cigarettes     Quit date: 1995     Years since quittin.0     Smokeless tobacco: Never Used   Vaping Use     Vaping Use: Never used   Substance Use Topics     Alcohol use: No     Comment: stopped a couple of months     Drug use: No     NICOLÁS-7 SCORE 2020   Total Score - 6 (mild anxiety) -   Total Score 10 6 3     PHQ 2020   PHQ-9 Total Score 5 1 0   Q9: Thoughts of better off dead/self-harm past 2 weeks Not at all Not at all Not at all     Last PHQ-9 2021   1.  Little interest or pleasure in doing things 0   2.  Feeling down, depressed, or hopeless 0   3.  Trouble falling or staying asleep, or sleeping too much 0   4.  Feeling tired or having little energy 0   5.  Poor appetite or overeating 0   6.  Feeling bad about yourself 0   7.  Trouble concentrating 0   8.  Moving slowly or restless 0   Q9: Thoughts of better off dead/self-harm past 2 weeks 0   PHQ-9 Total Score 0     NICOLÁS-7  2021   1. Feeling nervous, anxious, or on edge 3   2. Not being able to stop or control worrying 0   3. Worrying too much about different things 0   4. Trouble relaxing 0   5. Being so restless that it is hard to sit still 0   6. Becoming easily annoyed or irritable 0   7. Feeling afraid, as if something awful might happen 0   NICOLÁS-7 Total Score 3   If you checked any problems, how difficult have they made it for you to do your work, take care of things at home, or get along with other people? Not difficult at all         How many servings of fruits and vegetables do you eat daily?  2-3    On average, how many sweetened beverages do you drink each day (Examples: soda, juice, sweet tea, etc.  Do NOT count diet or artificially sweetened beverages)?   2    How many days per  "week do you exercise enough to make your heart beat faster? 7    How many minutes a day do you exercise enough to make your heart beat faster? 60 or more    How many days per week do you miss taking your medication? 0      Review of Systems   Constitutional, HEENT, cardiovascular, pulmonary, gi and gu systems are negative, except as otherwise noted.      Objective    /78   Pulse 86   Temp 98.7  F (37.1  C) (Tympanic)   Resp 20   Ht 1.803 m (5' 11\")   Wt 81.2 kg (179 lb)   SpO2 99%   BMI 24.97 kg/m    Body mass index is 24.97 kg/m .  Physical Exam   General: alert, cooperative, no acute distress   CV: RRR, no murmur  Resp: non-labored breathing, clear to auscultation, no wheezing or rales   Extremities: No peripheral edema, calves non-tender.   Neuro: CN II-XII grossly intact. No focal deficits. Strength and sensation in upper and lower extremities appropriate.   "

## 2022-03-02 ASSESSMENT — ANXIETY QUESTIONNAIRES: GAD7 TOTAL SCORE: 3

## 2022-03-08 ENCOUNTER — DOCUMENTATION ONLY (OUTPATIENT)
Dept: OTHER | Facility: CLINIC | Age: 77
End: 2022-03-08
Payer: COMMERCIAL

## 2022-03-25 DIAGNOSIS — R42 DIZZINESS: Primary | ICD-10-CM

## 2022-03-28 RX ORDER — MECLIZINE HYDROCHLORIDE 25 MG/1
25 TABLET ORAL 3 TIMES DAILY PRN
Qty: 60 TABLET | Refills: 0 | Status: SHIPPED | OUTPATIENT
Start: 2022-03-28 | End: 2022-08-31

## 2022-03-28 NOTE — TELEPHONE ENCOUNTER
"Routing refill request to provider for review/approval because:  Last signed by another provider.    Requested Prescriptions   Pending Prescriptions Disp Refills     meclizine (ANTIVERT) 25 MG tablet 60 tablet 0     Sig: Take 1 tablet (25 mg) by mouth 3 times daily as needed for dizziness        Antivertigo/Antiemetic Agents Passed - 3/25/2022  8:52 AM        Passed - Recent (12 mo) or future (30 days) visit within the authorizing provider's specialty     Patient has had an office visit with the authorizing provider or a provider within the authorizing providers department within the previous 12 mos or has a future within next 30 days. See \"Patient Info\" tab in inbasket, or \"Choose Columns\" in Meds & Orders section of the refill encounter.              Passed - Medication is active on med list        Passed - Patient is 18 years of age or older                   "

## 2022-03-31 ENCOUNTER — DOCUMENTATION ONLY (OUTPATIENT)
Dept: OTHER | Facility: CLINIC | Age: 77
End: 2022-03-31
Payer: COMMERCIAL

## 2022-05-31 ENCOUNTER — OFFICE VISIT (OUTPATIENT)
Dept: FAMILY MEDICINE | Facility: CLINIC | Age: 77
End: 2022-05-31
Payer: COMMERCIAL

## 2022-05-31 VITALS
WEIGHT: 178 LBS | BODY MASS INDEX: 24.92 KG/M2 | TEMPERATURE: 97.9 F | SYSTOLIC BLOOD PRESSURE: 138 MMHG | RESPIRATION RATE: 16 BRPM | HEART RATE: 74 BPM | HEIGHT: 71 IN | OXYGEN SATURATION: 97 % | DIASTOLIC BLOOD PRESSURE: 78 MMHG

## 2022-05-31 DIAGNOSIS — F41.1 ANXIETY STATE: ICD-10-CM

## 2022-05-31 DIAGNOSIS — F32.5 MAJOR DEPRESSIVE DISORDER WITH SINGLE EPISODE, IN FULL REMISSION (H): Primary | ICD-10-CM

## 2022-05-31 DIAGNOSIS — E78.2 MIXED HYPERLIPIDEMIA: ICD-10-CM

## 2022-05-31 PROCEDURE — 99213 OFFICE O/P EST LOW 20 MIN: CPT | Performed by: FAMILY MEDICINE

## 2022-05-31 RX ORDER — LORAZEPAM 1 MG/1
0.5 TABLET ORAL 3 TIMES DAILY PRN
Qty: 45 TABLET | Refills: 2 | Status: SHIPPED | OUTPATIENT
Start: 2022-05-31 | End: 2022-08-31

## 2022-05-31 RX ORDER — SERTRALINE HYDROCHLORIDE 100 MG/1
TABLET, FILM COATED ORAL
Qty: 90 TABLET | Refills: 3 | Status: SHIPPED | OUTPATIENT
Start: 2022-05-31 | End: 2023-02-27

## 2022-05-31 RX ORDER — PRAVASTATIN SODIUM 40 MG
40 TABLET ORAL EVERY EVENING
Qty: 90 TABLET | Refills: 3 | Status: SHIPPED | OUTPATIENT
Start: 2022-05-31 | End: 2023-01-27

## 2022-05-31 ASSESSMENT — PAIN SCALES - GENERAL: PAINLEVEL: NO PAIN (0)

## 2022-05-31 NOTE — PATIENT INSTRUCTIONS
Follow up in 3 months for recheck of anxiety and medication refills.     Medications refilled today.       Continue blood pressure medication. BP goal is less than 140/90.       HOW TO CHECK YOUR BLOOD PRESSURE AT HOME:      Avoid eating, smoking, and exercising for at least 30 minutes before taking a reading.     Be sure you have taken your BP medication at least 2-3 hours before you check it.      Sit quietly for 10 minutes before a reading.      Sit in a chair with your feet flat on the floor. Rest your  arm on a table so that the arm cuff is at the same level as your heart.     Remain still during the reading.     Record your blood pressure and pulse in a log and bring to your next appointment.

## 2022-05-31 NOTE — PROGRESS NOTES
Assessment & Plan     Major depressive disorder with single episode, in full remission (H)  Anxiety state  Overall, doing well. Wishes to continue on current drug regimen. Has been on ativan for over 20 years. Wishes to continue and understands the risk of the medication. 3 month supply provided today. Follow up in 3 months for refills. All questions answered.   - LORazepam (ATIVAN) 1 MG tablet  Dispense: 45 tablet; Refill: 2  - sertraline (ZOLOFT) 100 MG tablet  Dispense: 90 tablet; Refill: 3    Mixed hyperlipidemia  Refill provided. Check cholesterol and next visit.   - pravastatin (PRAVACHOL) 40 MG tablet  Dispense: 90 tablet; Refill: 3      The risks, benefits and treatment options of prescribed medications or other treatments have been discussed with the patient. The patient verbalized their understanding and should call or follow up if no improvement or if they develop further problems.    Follow up in 3 months.     Vazquez Marquez [facility] ANNEMARIE Montano is a 76 year old who presents for the following health issues     History of Present Illness       Reason for visit:  Check up    He eats 2-3 servings of fruits and vegetables daily.He exercises with enough effort to increase his heart rate 60 or more minutes per day.  He exercises with enough effort to increase his heart rate 6 days per week.   He is taking medications regularly.     76 year old male who presents to clinic for follow up of mood.       Depression and Anxiety Follow-Up    How are you doing with your depression since your last visit? No change    How are you doing with your anxiety since your last visit?  No change    Are you having other symptoms that might be associated with depression or anxiety? No    Have you had a significant life event? No     Do you have any concerns with your use of alcohol or other drugs? No      Zoloft 50 mg BID  Lorazepam 0.5 mg TID- on for the last 21 years.   Has been quite  busy with planting season.   No changes in his mood. Wishes to continue on current medications.   Understands the risks of Lorazepam and wishes to continue at this time.         Social History     Tobacco Use     Smoking status: Former Smoker     Packs/day: 0.25     Years: 20.00     Pack years: 5.00     Types: Cigarettes     Quit date: 1995     Years since quittin.3     Smokeless tobacco: Never Used   Vaping Use     Vaping Use: Never used   Substance Use Topics     Alcohol use: No     Comment: stopped a couple of months     Drug use: No     PHQ 2021 2021 3/1/2022   PHQ-9 Total Score 1 0 1   Q9: Thoughts of better off dead/self-harm past 2 weeks Not at all Not at all Not at all     NICOLÁS-7 SCORE 2021 2021 3/1/2022   Total Score 6 (mild anxiety) - -   Total Score 6 3 3     Last PHQ-9 3/1/2022   1.  Little interest or pleasure in doing things 0   2.  Feeling down, depressed, or hopeless 0   3.  Trouble falling or staying asleep, or sleeping too much 0   4.  Feeling tired or having little energy 1   5.  Poor appetite or overeating 0   6.  Feeling bad about yourself 0   7.  Trouble concentrating 0   8.  Moving slowly or restless 0   Q9: Thoughts of better off dead/self-harm past 2 weeks 0   PHQ-9 Total Score 1   Difficulty at work, home, or with people Somewhat difficult     NICOLÁS-7  3/1/2022   1. Feeling nervous, anxious, or on edge 3   2. Not being able to stop or control worrying 0   3. Worrying too much about different things 0   4. Trouble relaxing 0   5. Being so restless that it is hard to sit still 0   6. Becoming easily annoyed or irritable 0   7. Feeling afraid, as if something awful might happen 0   NICOLÁS-7 Total Score 3   If you checked any problems, how difficult have they made it for you to do your work, take care of things at home, or get along with other people? Somewhat difficult       Suicide Assessment Five-step Evaluation and Treatment (SAFE-T)            Review of Systems  "  Constitutional, HEENT, cardiovascular, pulmonary, gi and gu systems are negative, except as otherwise noted.      Objective    /78   Pulse 74   Temp 97.9  F (36.6  C) (Tympanic)   Resp 16   Ht 1.803 m (5' 11\")   Wt 80.7 kg (178 lb)   SpO2 97%   BMI 24.83 kg/m    Body mass index is 24.83 kg/m .  Physical Exam   General: alert, cooperative, no acute distress   CV: RRR, no murmur  Resp: non-labored breathing, clear to auscultation, no wheezing or rales   Abdomen: Soft, non-tender, no guarding.   Extremities: No peripheral edema, calves non-tender.   Appearance: Patient appears well groomed and appropriately dressed.   Orientation: Patient alert and oriented to person, place, and time.  Behavior: Appropriate to setting and is cooperative with mental status examination. No overactivity or catatonia.  Speech: Organized, clear, and concise, with appropriate volume and tone.  Thought: Able to think abstractly. No evidence of tangentiality or circumstantiality.  Perception: No auditory or visual hallucinations. No delusions.  Mood: No evidence of euphoria or dysphoria. No lability.  Affect: Congruent to mood. Not flattened or narrowed.  Insight/Judgement: Insight and judgment intact.        "

## 2022-07-01 ENCOUNTER — VIRTUAL VISIT (OUTPATIENT)
Dept: FAMILY MEDICINE | Facility: CLINIC | Age: 77
End: 2022-07-01
Payer: COMMERCIAL

## 2022-07-01 DIAGNOSIS — U07.1 INFECTION DUE TO 2019 NOVEL CORONAVIRUS: Primary | ICD-10-CM

## 2022-07-01 DIAGNOSIS — R05.9 COUGH: ICD-10-CM

## 2022-07-01 PROCEDURE — 99443 PR PHYSICIAN TELEPHONE EVALUATION 21-30 MIN: CPT | Mod: CS | Performed by: FAMILY MEDICINE

## 2022-07-01 RX ORDER — RAMIPRIL 10 MG/1
CAPSULE ORAL
COMMUNITY
End: 2022-08-31

## 2022-07-01 RX ORDER — MORPHINE SULFATE 15 MG/1
TABLET ORAL
COMMUNITY
End: 2022-07-01

## 2022-07-01 RX ORDER — CODEINE PHOSPHATE/GUAIFENESIN 10-100MG/5
5 LIQUID (ML) ORAL EVERY 4 HOURS PRN
Qty: 180 ML | Refills: 0 | Status: SHIPPED | OUTPATIENT
Start: 2022-07-01 | End: 2022-08-31

## 2022-07-01 RX ORDER — PRAVASTATIN SODIUM 40 MG
TABLET ORAL
COMMUNITY
End: 2022-08-31

## 2022-07-01 RX ORDER — LORAZEPAM 1 MG/1
TABLET ORAL
COMMUNITY
End: 2022-08-31

## 2022-07-01 ASSESSMENT — ENCOUNTER SYMPTOMS
COUGH: 1
ARTHRALGIAS: 0
NERVOUS/ANXIOUS: 0
MYALGIAS: 0
JOINT SWELLING: 0
SINUS PAIN: 1
FEVER: 0
SORE THROAT: 0
DIZZINESS: 0
WEAKNESS: 0
PARESTHESIAS: 0
SHORTNESS OF BREATH: 0
CHILLS: 0
RHINORRHEA: 1
PALPITATIONS: 0
HEADACHES: 0

## 2022-07-01 NOTE — PROGRESS NOTES
Dusty is a 76 year old who is being evaluated via a billable telephone visit.      What phone number would you like to be contacted at? [phone]  How would you like to obtain your AVS? MyChart    1. Infection due to 2019 novel coronavirus  Advised to hold pravastatin while on paxlovid.  Decrease amlodipine to 5 mg daily.  - nirmatrelvir and ritonavir (PAXLOVID) therapy pack; Take 3 tablets by mouth 2 times daily for 5 days Take 2 Nirmatrelvir tablets and 1 Ritonavir tablet twice daily for 5 days.  Dispense: 30 each; Refill: 0    2. Cough  - guaiFENesin-codeine (GUAIFENESIN AC) 100-10 MG/5ML syrup; Take 5 mLs by mouth every 4 hours as needed for congestion  Dispense: 180 mL; Refill: 0        Current Outpatient Medications   Medication     amLODIPine (NORVASC) 10 MG tablet     aspirin (ASA) 81 MG EC tablet     Biotin 41246 MCG TABS     Cholecalciferol (VITAMIN D3 PO)     Coenzyme Q10 (COQ-10 PO)     ferrous sulfate 325 (65 FE) MG tablet     LORazepam (ATIVAN) 1 MG tablet     Naproxen Sodium (ALEVE PO)     omeprazole (PRILOSEC) 20 MG DR capsule     pravastatin (PRAVACHOL) 40 MG tablet     psyllium (METAMUCIL) 58.6 % packet     sertraline (ZOLOFT) 100 MG tablet     Zinc 30 MG TABS     LORazepam (ATIVAN) 1 MG tablet     meclizine (ANTIVERT) 25 MG tablet     morphine (MSIR) 15 MG IR tablet     pravastatin (PRAVACHOL) 40 MG tablet     ramipril (ALTACE) 10 MG capsule     No current facility-administered medications for this visit.     Patient Active Problem List    Diagnosis Date Noted     Status post coronary angiogram 01/11/2021     Priority: Medium     Abnormal stress test 12/29/2020     Priority: Medium     Added automatically from request for surgery [id number]       NSVT (nonsustained ventricular tachycardia) (H) 12/29/2020     Priority: Medium     Added automatically from request for surgery [id number]       Ventricular tachycardia (H) 11/11/2020     Priority: Medium     History of colectomy 08/21/2020     Priority:  Medium     Esophageal reflux 10/17/2018     Priority: Medium     Benign hypertension 08/16/2018     Priority: Medium     Hereditary and idiopathic peripheral neuropathy 08/16/2018     Priority: Medium     Iron deficiency anemia due to chronic blood loss 08/16/2018     Priority: Medium     Major depressive disorder with single episode, in full remission (H) 08/16/2018     Priority: Medium     Started about 2017       Palpitations 08/16/2018     Priority: Medium     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     Priority: Medium     Disorder of right rotator cuff 08/16/2018     Priority: Medium     Mixed hyperlipidemia 10/02/2017     Priority: Medium     Benign prostatic hyperplasia with urinary obstruction 08/30/2013     Priority: Medium     Vitamin D deficiency 01/29/2013     Priority: Medium     Osteoarthritis 02/07/2009     Priority: Medium     DDD (degenerative disc disease), lumbar 02/07/2009     Priority: Medium     Degenerative arthritis of lumbar spine 02/07/2009     Priority: Medium     Ulcerative colitis (H) 02/07/2009     Priority: Medium     Anxiety state 12/06/2007     Priority: Medium     Knee joint replacement by other means 12/06/2007     Priority: Medium     Overview:   with Dr. Garcia 12-6-2007       Pure hypercholesterolemia 12/06/2007     Priority: Medium           Subjective   Dusty is a 76 year old, presenting for the following health issues:  Covid Treatment      HPI       COVID-19 Symptom Review  How many days ago did these symptoms start? 4 days    Are any of the following symptoms significant for you?    New or worsening difficulty breathing? No    Worsening cough? Yes, it's a dry cough. Dry and productive    Fever or chills? No    Headache: no    Sore throat: no    Chest pain: no. congestion    Diarrhea: no    Body aches? YES    What treatments has patient tried? Cold, cough, flu medication OTC   Does patient live in a nursing home, group home, or shelter? no  Does patient have a way to get  food/medications during quarantined? Yes, I have a friend or family member who can help me.          1. Cold symptoms: Cough, nasal congestion, sinus drainage.  No temperature.  Decreased sleep.  States of drainage and congestion.  Ongoing for the past 4 days.  Took over the counter alleve and cough medications.  Took COVID-19 on 7/28 which was negative.  Patient has been vaccinated.     Review of Systems   Constitutional: Negative for chills and fever.   HENT: Positive for congestion, rhinorrhea and sinus pain. Negative for ear pain, hearing loss and sore throat.         Sinus drainage   Respiratory: Positive for cough. Negative for shortness of breath.    Cardiovascular: Negative for chest pain, palpitations and peripheral edema.   Musculoskeletal: Negative for arthralgias, joint swelling and myalgias.   Skin: Negative for rash.   Neurological: Negative for dizziness, weakness, headaches and paresthesias.   Psychiatric/Behavioral: Negative for mood changes. The patient is not nervous/anxious.             Objective           Vitals:  No vitals were obtained today due to virtual visit.    Physical Exam   healthy, alert and no distress  PSYCH: Alert and oriented times 3; coherent speech, normal   rate and volume, able to articulate logical thoughts, able   to abstract reason, no tangential thoughts, no hallucinations   or delusions  His affect is normal  RESP: No cough, no audible wheezing, able to talk in full sentences  Remainder of exam unable to be completed due to telephone visits       Phone call duration: 30 minutes

## 2022-07-01 NOTE — PATIENT INSTRUCTIONS
Omar Montano,    Thank you for allowing [facility] to manage your care.    I sent your prescriptions to your pharmacy.    If you have any questions or concerns, please feel free to call us at [phone].    Sincerely,    Dr. Toro    Did you know?      You can schedule a video visit for follow-up appointments as well as future appointments for certain conditions.  Please see the below link.     [email or web address]    If you have not already done so,  I encourage you to sign up for [email or web address]).  This will allow you to review your results, securely communicate with a provider, and schedule virtual visits as well.      Instructions for Patients      What are the symptoms of COVID-19?  Symptoms can include fever, cough, shortness of breath, chills, headache, muscle pain sore throat, fatigue, runny or stuffy nose, and loss of taste and smell. Other less common symptoms include nausea, vomiting, or diarrhea (watery stools).    Know when to call 911. Emergency warning signs include:  Trouble breathing or shortness of breath  Pain or pressure in the chest that doesn't go away  Feeling confused like you haven't felt before, or not being able to wake up  Bluish-colored lips or face    How can I take care of myself?  Get lots of rest. Drink extra fluids (unless a doctor has told you not to).  Take Tylenol (acetaminophen) for fever or pain. If you have liver or kidney problems, ask your family doctor if it's okay to take Tylenol   Adults:   650 mg (two 325 mg pills or tablets) every 4 to 6 hours, or...   1,000 mg (two 500 mg pills or tablets) every 8 hours as needed.  Note: Don't take more than 3,000 mg in one day. Acetaminophen is found in many medicines (both prescribed and over the counter). Read all labels to be sure you don't take too much.  For children, check the Tylenol bottle for the right dose. The dose is based on the child's age or  weight.  Take over the counter medicines for your symptoms as needed. Talk to your pharmacist.  If you have other health problems (like cancer, heart failure, an organ transplant, or severe kidney disease): Call your specialty clinic if you don't feel better in the next 2 days.    These guidelines are for isolating and quarantining before returning to work, school or .   For employers, schools and day cares: This is an official notice for this person s medical guidelines for returning in-person.   For health care sites: The CDC gives different isolation and quarantine guidelines for healthcare sites, please check with these sites before arriving.     How do I self-isolate?  You isolate when you have symptoms of COVID or a test shows you have COVID, even if you don t have symptoms.   If you DO have symptoms:  Stay home and away from others  For at least 5 days after your symptoms started, AND   You are fever free for 24 hours (with no medicine that reduces fever), AND  Your other symptoms are better.  Wear a mask for 10 full days any time you are around others.  If you DON T have symptoms:  Stay at home and away from others for at least 5 days after your positive test.  Wear a mask for 10 full days any time you are around others.    How and when do I quarantine?  You quarantine when you may have been exposed to the virus and DON T have symptoms.   Stay home and away from others.   You must quarantine for 5 days after your last contact with a person who has COVID.  Note: If you are fully vaccinated, you don t need to quarantine. You should still follow the steps below.   Wear a mask for 10 full days any time you re around others.  Get tested at least 5 days after you were exposed, even if you don t have symptoms.   If you start to have symptoms, isolate right away and get tested.    Where can I get more information?  [facility] COVID-19 Resource Hub: [email or web address]   CDC Quarantine &  Isolation: https://www.cdc.gov/coronavirus/2019-ncov/your-health/quarantine-isolation.html   CDC - What to Do If You're Sick: https://www.cdc.gov/coronavirus/2019-ncov/if-you-are-sick/index.html  [facility] clinical trials (COVID-19 research studies): clinicalaffairs.[facility]/n-clinical-trials  [place] Department of Health COVID-19 Public Hotline: 1-101.310.6785

## 2022-08-21 ENCOUNTER — HEALTH MAINTENANCE LETTER (OUTPATIENT)
Age: 77
End: 2022-08-21

## 2022-08-31 ENCOUNTER — OFFICE VISIT (OUTPATIENT)
Dept: FAMILY MEDICINE | Facility: CLINIC | Age: 77
End: 2022-08-31
Payer: COMMERCIAL

## 2022-08-31 VITALS
OXYGEN SATURATION: 99 % | HEIGHT: 71 IN | HEART RATE: 77 BPM | DIASTOLIC BLOOD PRESSURE: 84 MMHG | RESPIRATION RATE: 16 BRPM | SYSTOLIC BLOOD PRESSURE: 130 MMHG | BODY MASS INDEX: 23.52 KG/M2 | WEIGHT: 168 LBS | TEMPERATURE: 97.9 F

## 2022-08-31 DIAGNOSIS — M25.512 CHRONIC LEFT SHOULDER PAIN: ICD-10-CM

## 2022-08-31 DIAGNOSIS — G89.29 CHRONIC LEFT SHOULDER PAIN: ICD-10-CM

## 2022-08-31 DIAGNOSIS — E78.2 MIXED HYPERLIPIDEMIA: ICD-10-CM

## 2022-08-31 DIAGNOSIS — F41.1 ANXIETY STATE: ICD-10-CM

## 2022-08-31 DIAGNOSIS — F32.5 MAJOR DEPRESSIVE DISORDER WITH SINGLE EPISODE, IN REMISSION (H): Primary | ICD-10-CM

## 2022-08-31 LAB
CHOLEST SERPL-MCNC: 192 MG/DL
HDLC SERPL-MCNC: 87 MG/DL
LDLC SERPL CALC-MCNC: 88 MG/DL
NONHDLC SERPL-MCNC: 105 MG/DL
TRIGL SERPL-MCNC: 85 MG/DL

## 2022-08-31 PROCEDURE — 36415 COLL VENOUS BLD VENIPUNCTURE: CPT | Performed by: FAMILY MEDICINE

## 2022-08-31 PROCEDURE — 99214 OFFICE O/P EST MOD 30 MIN: CPT | Performed by: FAMILY MEDICINE

## 2022-08-31 PROCEDURE — 80061 LIPID PANEL: CPT | Performed by: FAMILY MEDICINE

## 2022-08-31 RX ORDER — LORAZEPAM 1 MG/1
0.5 TABLET ORAL 3 TIMES DAILY PRN
Qty: 45 TABLET | Refills: 2 | Status: SHIPPED | OUTPATIENT
Start: 2022-08-31 | End: 2022-11-30

## 2022-08-31 ASSESSMENT — PATIENT HEALTH QUESTIONNAIRE - PHQ9
SUM OF ALL RESPONSES TO PHQ QUESTIONS 1-9: 5
5. POOR APPETITE OR OVEREATING: SEVERAL DAYS

## 2022-08-31 ASSESSMENT — ANXIETY QUESTIONNAIRES
6. BECOMING EASILY ANNOYED OR IRRITABLE: SEVERAL DAYS
5. BEING SO RESTLESS THAT IT IS HARD TO SIT STILL: SEVERAL DAYS
GAD7 TOTAL SCORE: 8
1. FEELING NERVOUS, ANXIOUS, OR ON EDGE: MORE THAN HALF THE DAYS
2. NOT BEING ABLE TO STOP OR CONTROL WORRYING: MORE THAN HALF THE DAYS
IF YOU CHECKED OFF ANY PROBLEMS ON THIS QUESTIONNAIRE, HOW DIFFICULT HAVE THESE PROBLEMS MADE IT FOR YOU TO DO YOUR WORK, TAKE CARE OF THINGS AT HOME, OR GET ALONG WITH OTHER PEOPLE: SOMEWHAT DIFFICULT
7. FEELING AFRAID AS IF SOMETHING AWFUL MIGHT HAPPEN: SEVERAL DAYS
GAD7 TOTAL SCORE: 8
3. WORRYING TOO MUCH ABOUT DIFFERENT THINGS: NOT AT ALL

## 2022-08-31 ASSESSMENT — PAIN SCALES - GENERAL: PAINLEVEL: MODERATE PAIN (5)

## 2022-08-31 NOTE — PROGRESS NOTES
Assessment & Plan     Major depressive disorder with single episode, in remission (H)  Anxiety state  Zoloft 50 mg BID  Lorazepam 0.5 mg TID- on for the last 21 years.    Wishes to continue on current medications.   Understands the risks of Lorazepam and wishes to continue at this time.   He tried to decrease ativan down to twice daily but did not tolerate.   - LORazepam (ATIVAN) 1 MG tablet  Dispense: 45 tablet; Refill: 2    Mixed hyperlipidemia  On pravastatin 40 mg daily. Tolerating. Doing well.   - Lipid panel reflex to direct LDL Fasting    Chronic left shoulder pain  Is going to be seeing Orthopedics in a week     The risks, benefits and treatment options of prescribed medications or other treatments have been discussed with the patient. The patient verbalized their understanding and should call or follow up if no improvement or if they develop further problems.    Follow up in 3 months.     Vazquez Marquez DO  [facility] ANNEMARIE Montano is a 77 year old, presenting for the following health issues:  Anxiety and Depression      HPI   77 year old male who presents to clinic for follow up of mood and cholesterol.    Hyperlipidemia Follow-Up      Are you regularly taking any medication or supplement to lower your cholesterol?   Yes- Pravachol    Are you having muscle aches or other side effects that you think could be caused by your cholesterol lowering medication?  No    Doing well, due for labs.     Depression and Anxiety Follow-Up    How are you doing with your depression since your last visit? No change    How are you doing with your anxiety since your last visit?  Worsened because of his shoulder    Are you having other symptoms that might be associated with depression or anxiety? Yes:  pain in his left shoulder, Sept 9th he sees ortho    Have you had a significant life event? Health Concerns     Do you have any concerns with your use of alcohol or other drugs? No       Zoloft  50 mg BID  Lorazepam 0.5 mg TID- on for the last 21 years.    Wishes to continue on current medications.   Understands the risks of Lorazepam and wishes to continue at this time.   He tried to decrease ativan down to twice daily but did not tolerate.     Hypertension   On amlodipine 10 mg daily.   BP typically between 135-140 systolic, diastolic in the 80's.       Social History     Tobacco Use     Smoking status: Former Smoker     Packs/day: 0.25     Years: 20.00     Pack years: 5.00     Types: Cigarettes     Quit date: 1995     Years since quittin.5     Smokeless tobacco: Never Used   Vaping Use     Vaping Use: Never used   Substance Use Topics     Alcohol use: No     Comment: stopped a couple of months     Drug use: No     PHQ 2021 3/1/2022 2022   PHQ-9 Total Score 0 1 5   Q9: Thoughts of better off dead/self-harm past 2 weeks Not at all Not at all Not at all     NICOLÁS-7 SCORE 2021 3/1/2022 2022   Total Score - - -   Total Score 3 3 8     Last PHQ-9 2022   1.  Little interest or pleasure in doing things 0   2.  Feeling down, depressed, or hopeless 0   3.  Trouble falling or staying asleep, or sleeping too much 3   4.  Feeling tired or having little energy 1   5.  Poor appetite or overeating 0   6.  Feeling bad about yourself 0   7.  Trouble concentrating 1   8.  Moving slowly or restless 0   Q9: Thoughts of better off dead/self-harm past 2 weeks 0   PHQ-9 Total Score 5   Difficulty at work, home, or with people Not difficult at all     NICOLÁS-7  2022   1. Feeling nervous, anxious, or on edge 2   2. Not being able to stop or control worrying 2   3. Worrying too much about different things 0   4. Trouble relaxing 1   5. Being so restless that it is hard to sit still 1   6. Becoming easily annoyed or irritable 1   7. Feeling afraid, as if something awful might happen 1   NICOLÁS-7 Total Score 8   If you checked any problems, how difficult have they made it for you to do your work,  "take care of things at home, or get along with other people? Somewhat difficult       Suicide Assessment Five-step Evaluation and Treatment (SAFE-T)      How many servings of fruits and vegetables do you eat daily?  2-3    On average, how many sweetened beverages do you drink each day (Examples: soda, juice, sweet tea, etc.  Do NOT count diet or artificially sweetened beverages)?   0    How many days per week do you exercise enough to make your heart beat faster? 3 or less    How many minutes a day do you exercise enough to make your heart beat faster? 9 or less    How many days per week do you miss taking your medication? 0      Review of Systems   Constitutional, HEENT, cardiovascular, pulmonary, gi and gu systems are negative, except as otherwise noted.      Objective    /84   Pulse 77   Temp 97.9  F (36.6  C) (Tympanic)   Resp 16   Ht 1.803 m (5' 11\")   Wt 76.2 kg (168 lb)   SpO2 99%   BMI 23.43 kg/m    Body mass index is 23.43 kg/m .  Physical Exam   General: alert, cooperative, no acute distress   CV: RRR, no murmur  Resp: non-labored breathing, clear to auscultation, no wheezing or rales   Abdomen: Soft, non-tender, no guarding.   Extremities: No peripheral edema, calves non-tender.   Appearance: Patient appears well groomed and appropriately dressed.   Orientation: Patient alert and oriented to person, place, and time.  Behavior: Appropriate to setting and is cooperative with mental status examination. No overactivity or catatonia.  Speech: Organized, clear, and concise, with appropriate volume and tone.  Thought: Able to think abstractly. No evidence of tangentiality or circumstantiality.  Perception: No auditory or visual hallucinations. No delusions.  Mood: No evidence of euphoria or dysphoria. No lability.  Affect: Congruent to mood. Not flattened or narrowed.  Insight/Judgement: Insight and judgment intact.      .  ..  "

## 2022-11-30 ENCOUNTER — OFFICE VISIT (OUTPATIENT)
Dept: FAMILY MEDICINE | Facility: CLINIC | Age: 77
End: 2022-11-30
Payer: COMMERCIAL

## 2022-11-30 VITALS
TEMPERATURE: 97.9 F | HEIGHT: 69 IN | DIASTOLIC BLOOD PRESSURE: 70 MMHG | RESPIRATION RATE: 18 BRPM | SYSTOLIC BLOOD PRESSURE: 128 MMHG | WEIGHT: 175 LBS | HEART RATE: 76 BPM | OXYGEN SATURATION: 97 % | BODY MASS INDEX: 25.92 KG/M2

## 2022-11-30 DIAGNOSIS — F32.5 MAJOR DEPRESSIVE DISORDER WITH SINGLE EPISODE, IN FULL REMISSION (H): Primary | ICD-10-CM

## 2022-11-30 DIAGNOSIS — F41.1 ANXIETY STATE: ICD-10-CM

## 2022-11-30 DIAGNOSIS — I10 BENIGN HYPERTENSION: ICD-10-CM

## 2022-11-30 PROCEDURE — 99214 OFFICE O/P EST MOD 30 MIN: CPT | Performed by: FAMILY MEDICINE

## 2022-11-30 RX ORDER — LORAZEPAM 1 MG/1
0.5 TABLET ORAL 3 TIMES DAILY PRN
Qty: 45 TABLET | Refills: 2 | Status: SHIPPED | OUTPATIENT
Start: 2022-11-30 | End: 2023-02-27

## 2022-11-30 RX ORDER — AMLODIPINE BESYLATE 10 MG/1
10 TABLET ORAL DAILY
Qty: 90 TABLET | Refills: 3 | Status: SHIPPED | OUTPATIENT
Start: 2022-11-30 | End: 2023-08-28

## 2022-11-30 ASSESSMENT — PATIENT HEALTH QUESTIONNAIRE - PHQ9
SUM OF ALL RESPONSES TO PHQ QUESTIONS 1-9: 0
SUM OF ALL RESPONSES TO PHQ QUESTIONS 1-9: 0

## 2022-11-30 ASSESSMENT — ANXIETY QUESTIONNAIRES
GAD7 TOTAL SCORE: 0
7. FEELING AFRAID AS IF SOMETHING AWFUL MIGHT HAPPEN: NOT AT ALL
1. FEELING NERVOUS, ANXIOUS, OR ON EDGE: NOT AT ALL
2. NOT BEING ABLE TO STOP OR CONTROL WORRYING: NOT AT ALL
GAD7 TOTAL SCORE: 0
GAD7 TOTAL SCORE: 0
3. WORRYING TOO MUCH ABOUT DIFFERENT THINGS: NOT AT ALL
4. TROUBLE RELAXING: NOT AT ALL
5. BEING SO RESTLESS THAT IT IS HARD TO SIT STILL: NOT AT ALL
6. BECOMING EASILY ANNOYED OR IRRITABLE: NOT AT ALL
7. FEELING AFRAID AS IF SOMETHING AWFUL MIGHT HAPPEN: NOT AT ALL

## 2022-11-30 ASSESSMENT — PAIN SCALES - GENERAL: PAINLEVEL: MILD PAIN (3)

## 2022-11-30 NOTE — PROGRESS NOTES
Assessment & Plan     Major depressive disorder with single episode, in full remission (H)  Anxiety state  Currently on Zoloft 50 mg twice daily and Ativan 0.5 mg 3 times daily as needed.  He reports doing extremely well on current regimen.  Like to continue his current regimen.  Had numerous discussions today and in the past about Ativan being a controlled substance and dangers of increased risk of falls.  He voices understanding and wishes to continue on current therapy. Refill provided today.  Patient follow-up in 3 months.  - LORazepam (ATIVAN) 1 MG tablet  Dispense: 45 tablet; Refill: 2    Benign hypertension  Blood pressure currently well controlled 120/70 today in clinic.  Currently asymptomatic.  Refill provided.  - amLODIPine (NORVASC) 10 MG tablet  Dispense: 90 tablet; Refill: 3    The risks, benefits and treatment options of prescribed medications or other treatments have been discussed with the patient. The patient verbalized their understanding and should call or follow up if no improvement or if they develop further problems.    Follow up in 3 months.     Vazquez Marquez, [facility] ANNEMARIE Montano is a 77 year old, presenting for the following health issues:  Anxiety and Depression      History of Present Illness       Mental Health Follow-up:  Patient presents to follow-up on Depression & Anxiety.Patient's depression since last visit has been:  Good  The patient is not having other symptoms associated with depression.  Patient's anxiety since last visit has been:  Good  The patient is not having other symptoms associated with anxiety.  Any significant life events: No  Patient is not feeling anxious or having panic attacks.  Patient has no concerns about alcohol or drug use.    He eats 2-3 servings of fruits and vegetables daily.He consumes 2 sweetened beverage(s) daily.He exercises with enough effort to increase his heart rate 60 or more minutes per day.  He  "exercises with enough effort to increase his heart rate 7 days per week.   He is taking medications regularly.    Today's PHQ-9         PHQ-9 Total Score: 0    PHQ-9 Q9 Thoughts of better off dead/self-harm past 2 weeks :   Not at all      Today's NICOLÁS-7 Score: 0       Depression and Anxiety Follow-Up    Zoloft 50 mg twice daily  Lorazepam 0.5 mg 3 times daily. In the past has tried to cut down to twice daily but resulted in significant anxiety.   He has tried multiple other agents in the past without success. He states this regimen is what works for him and would like to continue. Understands the risks.   Mood has been good.   Likes to work outside.       HTN  Amlodipine 10 mg daily.   /70 today in clinic.   Asymptomatic.   Not checking BP at home.       Social History     Tobacco Use     Smoking status: Former     Packs/day: 0.25     Years: 20.00     Pack years: 5.00     Types: Cigarettes     Quit date: 1995     Years since quittin.8     Smokeless tobacco: Never   Vaping Use     Vaping Use: Never used   Substance Use Topics     Alcohol use: No     Comment: stopped a couple of months     Drug use: No     PHQ 3/1/2022 2022 2022   PHQ-9 Total Score 1 5 0   Q9: Thoughts of better off dead/self-harm past 2 weeks Not at all Not at all Not at all     NICOLÁS-7 SCORE 3/1/2022 2022 2022   Total Score - - 0 (minimal anxiety)   Total Score 3 8 0       Review of Systems   Constitutional, HEENT, cardiovascular, pulmonary, gi and gu systems are negative, except as otherwise noted.      Objective    /70 (BP Location: Right arm, Patient Position: Chair, Cuff Size: Adult Regular)   Pulse 76   Temp 97.9  F (36.6  C) (Tympanic)   Resp 18   Ht 1.765 m (5' 9.49\")   Wt 79.4 kg (175 lb)   SpO2 97%   BMI 25.48 kg/m    Body mass index is 25.48 kg/m .  Physical Exam   General: alert, cooperative, no acute distress   CV: RRR, no murmur  Resp: non-labored breathing, clear to auscultation, no " wheezing or rales   Abdomen: Soft, non-tender, no guarding.   Extremities: trace edema LE, calves non-tender.   Appearance: Patient appears well groomed and appropriately dressed.   Orientation: Patient alert and oriented to person, place, and time.  Behavior: Appropriate to setting and is cooperative with mental status examination. No overactivity or catatonia.  Speech: Organized, clear, and concise, with appropriate volume and tone.  Thought: Able to think abstractly. No evidence of tangentiality or circumstantiality.  Perception: No auditory or visual hallucinations. No delusions.  Mood: No evidence of euphoria or dysphoria. No lability.  Affect: Congruent to mood. Not flattened or narrowed.  Insight/Judgement: Insight and judgment intact.

## 2022-12-02 ENCOUNTER — HOSPITAL ENCOUNTER (OUTPATIENT)
Dept: OUTPATIENT PROCEDURES | Facility: CLINIC | Age: 77
Discharge: HOME | End: 2022-12-02
Attending: OPHTHALMOLOGY | Admitting: OPHTHALMOLOGY
Payer: COMMERCIAL

## 2022-12-02 PROCEDURE — 66821 AFTER CATARACT LASER SURGERY: CPT | Mod: LT

## 2023-01-27 ENCOUNTER — OFFICE VISIT (OUTPATIENT)
Dept: CARDIOLOGY | Facility: CLINIC | Age: 78
End: 2023-01-27
Payer: COMMERCIAL

## 2023-01-27 ENCOUNTER — HOSPITAL ENCOUNTER (OUTPATIENT)
Dept: CARDIOLOGY | Facility: CLINIC | Age: 78
Discharge: HOME | End: 2023-01-27
Attending: NURSE PRACTITIONER | Admitting: NURSE PRACTITIONER
Payer: COMMERCIAL

## 2023-01-27 VITALS
BODY MASS INDEX: 26.65 KG/M2 | HEART RATE: 78 BPM | WEIGHT: 183 LBS | DIASTOLIC BLOOD PRESSURE: 68 MMHG | OXYGEN SATURATION: 99 % | SYSTOLIC BLOOD PRESSURE: 134 MMHG

## 2023-01-27 DIAGNOSIS — I10 BENIGN ESSENTIAL HYPERTENSION: ICD-10-CM

## 2023-01-27 DIAGNOSIS — I49.1 PREMATURE ATRIAL CONTRACTIONS: ICD-10-CM

## 2023-01-27 DIAGNOSIS — E78.5 HYPERLIPIDEMIA LDL GOAL <70: ICD-10-CM

## 2023-01-27 DIAGNOSIS — I47.10 PAROXYSMAL SUPRAVENTRICULAR TACHYCARDIA (H): ICD-10-CM

## 2023-01-27 DIAGNOSIS — I25.10 NONOBSTRUCTIVE ATHEROSCLEROSIS OF CORONARY ARTERY: Primary | ICD-10-CM

## 2023-01-27 DIAGNOSIS — E78.2 MIXED HYPERLIPIDEMIA: ICD-10-CM

## 2023-01-27 PROCEDURE — 99214 OFFICE O/P EST MOD 30 MIN: CPT | Performed by: NURSE PRACTITIONER

## 2023-01-27 PROCEDURE — 93005 ELECTROCARDIOGRAM TRACING: CPT | Performed by: CLINICAL EXERCISE PHYSIOLOGIST

## 2023-01-27 PROCEDURE — 93010 ELECTROCARDIOGRAM REPORT: CPT | Performed by: NURSE PRACTITIONER

## 2023-01-27 RX ORDER — PRAVASTATIN SODIUM 80 MG/1
80 TABLET ORAL EVERY EVENING
Qty: 90 TABLET | Refills: 3 | Status: SHIPPED | OUTPATIENT
Start: 2023-01-27 | End: 2023-11-20

## 2023-01-27 NOTE — PATIENT INSTRUCTIONS
Medication Changes:  INCREASE pravastatin to 80 mg daily     Recommendations:  Check blood pressure at least 1 hour after medications. Call the clinic if your blood pressure is consistently greater than 130/80.      Follow-up:  Fasting lab in 2 months (lipid/ALT)  See Dr. Napier for cardiology follow up at [facility]: January 2024.   Call 6 months prior, to schedule.     Cardiology Scheduling~[phone]  Cardiology Clinic RN~[phone] (Bijal RN, Winter RN, Jolanta RN)

## 2023-01-27 NOTE — PROGRESS NOTES
Cardiology Clinic Progress Note  Dusty Ruggiero MRN# [id number]   YOB: 1945 Age: 77 year old      Primary Cardiologist:   Dr. Napier           History of Presenting Illness:      Dusty Ruggiero is a pleasant 77 year old patient with a past cardiac history significant for   1. Nonobstructive CAD    Angio 1/2021 70% OM2-medically managed  2. Hypertension  3. Hyperlipidemia  4. PACs    Asymptomatic    15% 2021  Past medical history significant for ulcerative colitis.    Zio patch 2020 showed sinus rhythm with 12 beat run of VT, few runs of SVT up to 13 seconds.  He was started on carvedilol.  Echo December 2020 with EF 60% and no significant valvular disease.  Lexiscan showed mild linda-infarct ischemia but coronary angiogram showed no significant obstructive disease with 70% OM 2 that was treated medically.  Follow-up Zio patch December 2020 showed 15% PACs with runs up to 16 seconds.  Holter monitor June 2021 showed 14% PACs with runs up to 11 beats.    Patient was seen by Dr. Napier in January 2022.  He was able to walk on the treadmill at 1.8 mph 10% incline for 10 minutes without cardiac complaints.  He was asymptomatic with the PACs.  He was noted to have a history of some lightheadedness on carvedilol.    Pt presents today for annual follow-up. Lipid profile August 2022 showing LDL 88, otherwise controlled and ALT WNL.  BMP February 2022 with normal renal function and electrolytes.  Results reviewed today.    Blood pressure today is controlled.  He remains active walking 1 mile on the treadmill and doing 4 miles on the recumbent bike.  He also has an acre yard and does a lot of gardening and yard work.  He reports no anginal symptoms.  He has only rare palpitations or sometimes a feeling of faster heartbeats, which are not bothersome.  He is agreeable to uptitrating his statin therapy.  On exam his heart rate was irregularly irregular.  EKG today, personally reviewed by me, shows sinus rhythm  with frequent PACs unchanged from prior.  Patient reports no chest pain, shortness of breath, PND, orthopnea, presyncope, syncope, edema.       Labs:  LIPID RESULTS:  Lab Results   Component Value Date    CHOL 192 08/31/2022    CHOL 209 (H) 06/30/2021    HDL 87 08/31/2022    HDL 96 06/30/2021    LDL 88 08/31/2022    LDL 94 06/30/2021    TRIG 85 08/31/2022    TRIG 93 06/30/2021    CHOLHDLRATIO 2.6 03/21/2005       LIVER ENZYME RESULTS:  Lab Results   Component Value Date    AST 20 02/26/2022    AST 21 03/21/2005    ALT 22 02/26/2022    ALT 25 06/30/2021       CBC RESULTS:  Lab Results   Component Value Date    WBC 8.1 02/26/2022    WBC 10.1 01/11/2021    RBC 4.71 02/26/2022    RBC 4.68 01/11/2021    HGB 14.7 02/26/2022    HGB 13.5 01/11/2021    HCT 43.7 02/26/2022    HCT 41.8 01/11/2021    MCV 93 02/26/2022    MCV 89 01/11/2021    MCH 31.2 02/26/2022    MCH 28.8 01/11/2021    MCHC 33.6 02/26/2022    MCHC 32.3 01/11/2021    RDW 12.4 02/26/2022    RDW 13.2 01/11/2021     02/26/2022     01/11/2021       BMP RESULTS:  Lab Results   Component Value Date     02/26/2022     01/11/2021    POTASSIUM 3.7 02/26/2022    POTASSIUM 3.6 01/11/2021    CHLORIDE 105 02/26/2022    CHLORIDE 103 01/11/2021    CO2 25 02/26/2022    CO2 27 01/11/2021    ANIONGAP 7 02/26/2022    ANIONGAP 6 01/11/2021     (H) 02/26/2022     (H) 01/11/2021    BUN 16 02/26/2022    BUN 14 01/11/2021    CR 0.90 02/26/2022    CR 0.85 01/11/2021    GFRESTIMATED 89 02/26/2022    GFRESTIMATED 82 02/18/2021    GFRESTBLACK >90 02/18/2021    ROCIO 9.4 02/26/2022    ROCIO 9.5 01/11/2021        A1C RESULTS:  No results found for: A1C    INR RESULTS:  Lab Results   Component Value Date    INR 1.11 01/11/2021       Results reviewed today.       Current Cardiac Medications     Amlodipine 10 mg daily  Aspirin 81 mg daily  Co-Q10  Pravastatin 40 mg daily                   Assessment and Plan:       Plan    Patient Instructions   Medication  Changes:  5. INCREASE pravastatin to 80 mg daily     Recommendations:  1. Check blood pressure at least 1 hour after medications. Call the clinic if your blood pressure is consistently greater than 130/80.      Follow-up:  1. Fasting lab in 2 months (lipid/ALT)  2. See Dr. Napier for cardiology follow up at [facility]: January 2024.   Call 6 months prior, to schedule.     Cardiology Scheduling~[phone]  Cardiology Clinic RN~[phone] (Bijal RN, Winter RN, Jolanta RN)            1. Nonobstructive CAD    No angina    Continue statin, aspirin, CCB      2. Hypertension    Controlled    Continue amlodipine      3. Hyperlipidemia    Last LDL 88 on 8/2022-on pravastatin 40 mg daily    Increase pravastatin to 80 mg daily      4. PACs    Asymptomatic    History of lightheadedness and carvedilol             Thank you for allowing me to participate in this delightful patient's care.      This note was completed in part using Dragon voice recognition software. Although reviewed after completion, some word and grammatical errors may occur.    Venecia Contreras, APRN CNP, APRN, CNP           Data:   All laboratory data reviewed      Total time spent today was 30 mins, reviewing labs, testing, notes, documenting notes, and seeing patient.          Constitutional:  cooperative, alert and oriented, well developed, well nourished, in no acute distress    Skin:  warm and dry to the touch         Head:  normocephalic       Eyes:  pupils equal and round       ENT:  no pallor or cyanosis       Neck:  no stiffness       Respiratory:  clear to auscultation; normal symmetry        Cardiac: regular rate and regularly irregular rhythm; normal S1 and S2                 pulses full and equal       GI:  abdomen soft, nondistended     Extremities and Muscular Skeletal:  no edema        Neurological:  affect appropriate; no gross motor deficits       Psych:  Alert and Oriented x 3 , appropriate affact

## 2023-01-27 NOTE — LETTER
1/27/2023    Vazquez Marquez, DO  [address] [facility] [address]    RE: Dusty Ruggiero       Dear Colleague,     I had the pleasure of seeing Dusty Ruggiero in the [facility].  Cardiology Clinic Progress Note  Dusty Ruggiero MRN# [id number]   YOB: 1945 Age: 77 year old      Primary Cardiologist:   Dr. Napier           History of Presenting Illness:      Dusty Ruggiero is a pleasant 77 year old patient with a past cardiac history significant for   1. Nonobstructive CAD    Angio 1/2021 70% OM2-medically managed  2. Hypertension  3. Hyperlipidemia  4. PACs    Asymptomatic    15% 2021  Past medical history significant for ulcerative colitis.    Zio patch 2020 showed sinus rhythm with 12 beat run of VT, few runs of SVT up to 13 seconds.  He was started on carvedilol.  Echo December 2020 with EF 60% and no significant valvular disease.  Lexiscan showed mild linda-infarct ischemia but coronary angiogram showed no significant obstructive disease with 70% OM 2 that was treated medically.  Follow-up Zio patch December 2020 showed 15% PACs with runs up to 16 seconds.  Holter monitor June 2021 showed 14% PACs with runs up to 11 beats.    Patient was seen by Dr. Napier in January 2022.  He was able to walk on the treadmill at 1.8 mph 10% incline for 10 minutes without cardiac complaints.  He was asymptomatic with the PACs.  He was noted to have a history of some lightheadedness on carvedilol.    Pt presents today for annual follow-up. Lipid profile August 2022 showing LDL 88, otherwise controlled and ALT WNL.  BMP February 2022 with normal renal function and electrolytes.  Results reviewed today.    Blood pressure today is controlled.  He remains active walking 1 mile on the treadmill and doing 4 miles on the recumbent bike.  He also has an acre yard and does a lot of gardening and yard work.  He reports no anginal symptoms.  He has only rare palpitations or sometimes a feeling of  faster heartbeats, which are not bothersome.  He is agreeable to uptitrating his statin therapy.  On exam his heart rate was irregularly irregular.  EKG today, personally reviewed by me, shows sinus rhythm with frequent PACs unchanged from prior.  Patient reports no chest pain, shortness of breath, PND, orthopnea, presyncope, syncope, edema.       Labs:  LIPID RESULTS:  Lab Results   Component Value Date    CHOL 192 08/31/2022    CHOL 209 (H) 06/30/2021    HDL 87 08/31/2022    HDL 96 06/30/2021    LDL 88 08/31/2022    LDL 94 06/30/2021    TRIG 85 08/31/2022    TRIG 93 06/30/2021    CHOLHDLRATIO 2.6 03/21/2005       LIVER ENZYME RESULTS:  Lab Results   Component Value Date    AST 20 02/26/2022    AST 21 03/21/2005    ALT 22 02/26/2022    ALT 25 06/30/2021       CBC RESULTS:  Lab Results   Component Value Date    WBC 8.1 02/26/2022    WBC 10.1 01/11/2021    RBC 4.71 02/26/2022    RBC 4.68 01/11/2021    HGB 14.7 02/26/2022    HGB 13.5 01/11/2021    HCT 43.7 02/26/2022    HCT 41.8 01/11/2021    MCV 93 02/26/2022    MCV 89 01/11/2021    MCH 31.2 02/26/2022    MCH 28.8 01/11/2021    MCHC 33.6 02/26/2022    MCHC 32.3 01/11/2021    RDW 12.4 02/26/2022    RDW 13.2 01/11/2021     02/26/2022     01/11/2021       BMP RESULTS:  Lab Results   Component Value Date     02/26/2022     01/11/2021    POTASSIUM 3.7 02/26/2022    POTASSIUM 3.6 01/11/2021    CHLORIDE 105 02/26/2022    CHLORIDE 103 01/11/2021    CO2 25 02/26/2022    CO2 27 01/11/2021    ANIONGAP 7 02/26/2022    ANIONGAP 6 01/11/2021     (H) 02/26/2022     (H) 01/11/2021    BUN 16 02/26/2022    BUN 14 01/11/2021    CR 0.90 02/26/2022    CR 0.85 01/11/2021    GFRESTIMATED 89 02/26/2022    GFRESTIMATED 82 02/18/2021    GFRESTBLACK >90 02/18/2021    ROCIO 9.4 02/26/2022    ROCIO 9.5 01/11/2021        A1C RESULTS:  No results found for: A1C    INR RESULTS:  Lab Results   Component Value Date    INR 1.11 01/11/2021       Results reviewed  today.       Current Cardiac Medications     Amlodipine 10 mg daily  Aspirin 81 mg daily  Co-Q10  Pravastatin 40 mg daily                   Assessment and Plan:       Plan    Patient Instructions   Medication Changes:  5. INCREASE pravastatin to 80 mg daily     Recommendations:  1. Check blood pressure at least 1 hour after medications. Call the clinic if your blood pressure is consistently greater than 130/80.      Follow-up:  1. Fasting lab in 2 months (lipid/ALT)  2. See Dr. Napier for cardiology follow up at [facility]: January 2024.   Call 6 months prior, to schedule.     Cardiology Scheduling~[phone]  Cardiology Clinic RN~[phone] (Bijal RN, Winter RN, Jolanta RN)            1. Nonobstructive CAD    No angina    Continue statin, aspirin, CCB      2. Hypertension    Controlled    Continue amlodipine      3. Hyperlipidemia    Last LDL 88 on 8/2022-on pravastatin 40 mg daily    Increase pravastatin to 80 mg daily      4. PACs    Asymptomatic    History of lightheadedness and carvedilol             Thank you for allowing me to participate in this delightful patient's care.      This note was completed in part using Dragon voice recognition software. Although reviewed after completion, some word and grammatical errors may occur.    Venecia Contreras, APRN CNP, APRN, CNP           Data:   All laboratory data reviewed      Total time spent today was 30 mins, reviewing labs, testing, notes, documenting notes, and seeing patient.          Constitutional:  cooperative, alert and oriented, well developed, well nourished, in no acute distress    Skin:  warm and dry to the touch         Head:  normocephalic       Eyes:  pupils equal and round       ENT:  no pallor or cyanosis       Neck:  no stiffness       Respiratory:  clear to auscultation; normal symmetry        Cardiac: regular rate and regularly irregular rhythm; normal S1 and S2                 pulses full and equal       GI:  abdomen soft,  nondistended     Extremities and Muscular Skeletal:  no edema        Neurological:  affect appropriate; no gross motor deficits       Psych:  Alert and Oriented x 3 , appropriate affact    Thank you for allowing me to participate in the care of your patient.      Sincerely,     AURORA Sousa [facility]  cc:   No referring provider defined for this encounter.

## 2023-01-27 NOTE — NURSING NOTE
Chief Complaint   Patient presents with     Coronary Artery Disease     Annual SANTI Follow up NOCAD,PAC (ED 02/26/22-vertigo)         There were no vitals filed for this visit.  Wt Readings from Last 1 Encounters:   11/30/22 79.4 kg (175 lb)       Penny Corrales MA

## 2023-02-14 DIAGNOSIS — K21.00 GASTROESOPHAGEAL REFLUX DISEASE WITH ESOPHAGITIS WITHOUT HEMORRHAGE: ICD-10-CM

## 2023-02-15 NOTE — TELEPHONE ENCOUNTER
Prescription approved per [facility] Refill Protocol.    Devika Clifford RN  [facility]

## 2023-02-27 ENCOUNTER — OFFICE VISIT (OUTPATIENT)
Dept: FAMILY MEDICINE | Facility: CLINIC | Age: 78
End: 2023-02-27
Payer: COMMERCIAL

## 2023-02-27 VITALS
OXYGEN SATURATION: 98 % | HEART RATE: 63 BPM | BODY MASS INDEX: 25.48 KG/M2 | SYSTOLIC BLOOD PRESSURE: 136 MMHG | WEIGHT: 178 LBS | DIASTOLIC BLOOD PRESSURE: 70 MMHG | HEIGHT: 70 IN | TEMPERATURE: 98.1 F | RESPIRATION RATE: 18 BRPM

## 2023-02-27 DIAGNOSIS — F32.5 MAJOR DEPRESSIVE DISORDER WITH SINGLE EPISODE, IN FULL REMISSION (H): Primary | ICD-10-CM

## 2023-02-27 DIAGNOSIS — F41.1 ANXIETY STATE: ICD-10-CM

## 2023-02-27 DIAGNOSIS — K51.90 ULCERATIVE COLITIS WITHOUT COMPLICATIONS, UNSPECIFIED LOCATION (H): ICD-10-CM

## 2023-02-27 PROCEDURE — 99213 OFFICE O/P EST LOW 20 MIN: CPT | Performed by: FAMILY MEDICINE

## 2023-02-27 RX ORDER — LORAZEPAM 1 MG/1
0.5 TABLET ORAL 3 TIMES DAILY PRN
Qty: 45 TABLET | Refills: 5 | Status: SHIPPED | OUTPATIENT
Start: 2023-02-27 | End: 2023-08-28

## 2023-02-27 RX ORDER — SERTRALINE HYDROCHLORIDE 100 MG/1
TABLET, FILM COATED ORAL
Qty: 90 TABLET | Refills: 3 | Status: SHIPPED | OUTPATIENT
Start: 2023-02-27 | End: 2023-08-28

## 2023-02-27 ASSESSMENT — PATIENT HEALTH QUESTIONNAIRE - PHQ9
SUM OF ALL RESPONSES TO PHQ QUESTIONS 1-9: 0
5. POOR APPETITE OR OVEREATING: NOT AT ALL

## 2023-02-27 ASSESSMENT — ANXIETY QUESTIONNAIRES
1. FEELING NERVOUS, ANXIOUS, OR ON EDGE: SEVERAL DAYS
GAD7 TOTAL SCORE: 1
IF YOU CHECKED OFF ANY PROBLEMS ON THIS QUESTIONNAIRE, HOW DIFFICULT HAVE THESE PROBLEMS MADE IT FOR YOU TO DO YOUR WORK, TAKE CARE OF THINGS AT HOME, OR GET ALONG WITH OTHER PEOPLE: NOT DIFFICULT AT ALL
3. WORRYING TOO MUCH ABOUT DIFFERENT THINGS: NOT AT ALL
2. NOT BEING ABLE TO STOP OR CONTROL WORRYING: NOT AT ALL
7. FEELING AFRAID AS IF SOMETHING AWFUL MIGHT HAPPEN: NOT AT ALL
GAD7 TOTAL SCORE: 1
6. BECOMING EASILY ANNOYED OR IRRITABLE: NOT AT ALL
5. BEING SO RESTLESS THAT IT IS HARD TO SIT STILL: NOT AT ALL

## 2023-02-27 NOTE — PROGRESS NOTES
Assessment & Plan     Major depressive disorder with single episode, in full remission (H)  Anxiety state  Continue on zoloft 50 mg twice daily.   -- using ativan 0.5 mg three times daily. Has tried to decrease without success. Has been on medication for many years. Wishes to continue on current medications. Understands the risks of the ativan medication.   -- Follow up in 6 months or sooner as needed.   - sertraline (ZOLOFT) 100 MG tablet  Dispense: 90 tablet; Refill: 3  - LORazepam (ATIVAN) 1 MG tablet  Dispense: 45 tablet; Refill: 5    Ulcerative colitis without complications, unspecified location (H)  History of colon removal.   Known issue that I take into account for their medical decisions; no current exacerbations or new concerns.       The risks, benefits and treatment options of prescribed medications or other treatments have been discussed with the patient. The patient verbalized their understanding and should call or follow up if no improvement or if they develop further problems.    Follow up in 6 months.       Vazquez Marquez, [employer] ANNEMARIE Montano is a 77 year old, presenting for the following health issues:  Anxiety and Depression      HPI     Depression and Anxiety Follow-Up    How are you doing with your depression since your last visit? No change    How are you doing with your anxiety since your last visit?  No change    Are you having other symptoms that might be associated with depression or anxiety? No    Have you had a significant life event? No     Do you have any concerns with your use of alcohol or other drugs? No    Zoloft 50 mg twice daily  Lorazepam 0.5 mg 3 times daily. In the past has tried to cut down to twice daily but resulted in significant anxiety.   He has tried multiple other agents in the past without success. He states this regimen is what works for him and would like to continue. Understands the risks.   -- doing well. No issues.  "    History of ulcerative colitis  Had colon removal in the past. In . Reverse pouch procedure in .       Social History     Tobacco Use     Smoking status: Former     Packs/day: 0.25     Years: 20.00     Pack years: 5.00     Types: Cigarettes     Quit date: 1995     Years since quittin.0     Smokeless tobacco: Never   Vaping Use     Vaping Use: Never used   Substance Use Topics     Alcohol use: No     Comment: stopped a couple of months     Drug use: No     PHQ 3/1/2022 2022 2022   PHQ-9 Total Score 1 5 0   Q9: Thoughts of better off dead/self-harm past 2 weeks Not at all Not at all Not at all     NICOLÁS-7 SCORE 3/1/2022 2022 2022   Total Score - - 0 (minimal anxiety)   Total Score 3 8 0       Suicide Assessment Five-step Evaluation and Treatment (SAFE-T)      How many servings of fruits and vegetables do you eat daily?  2-3    On average, how many sweetened beverages do you drink each day (Examples: soda, juice, sweet tea, etc.  Do NOT count diet or artificially sweetened beverages)?   2    How many days per week do you exercise enough to make your heart beat faster? 7    How many minutes a day do you exercise enough to make your heart beat faster? 30 - 60    How many days per week do you miss taking your medication? 0      Review of Systems   Constitutional, HEENT, cardiovascular, pulmonary, gi and gu systems are negative, except as otherwise noted.      Objective    /70   Pulse 63   Temp 98.1  F (36.7  C) (Tympanic)   Resp 18   Ht 1.77 m (5' 9.69\")   Wt 80.7 kg (178 lb)   SpO2 98%   BMI 25.77 kg/m    Body mass index is 25.77 kg/m .  Physical Exam   General: alert, cooperative, no acute distress   CV: RRR, occasional ectopics.   Resp: non-labored breathing, clear to auscultation, no wheezing or rales   Abdomen: Soft, non-tender, no guarding.   Extremities: No peripheral edema, calves non-tender.   Appearance: Patient appears well groomed and appropriately dressed. "   Orientation: Patient alert and oriented to person, place, and time.  Behavior: Appropriate to setting and is cooperative with mental status examination. No overactivity or catatonia.  Speech: Organized, clear, and concise, with appropriate volume and tone.  Thought: Able to think abstractly. No evidence of tangentiality or circumstantiality.  Perception: No auditory or visual hallucinations. No delusions.  Mood: No evidence of euphoria or dysphoria. No lability.  Affect: Congruent to mood. Not flattened or narrowed.  Insight/Judgement: Insight and judgment intact.

## 2023-03-27 ENCOUNTER — TELEPHONE (OUTPATIENT)
Dept: FAMILY MEDICINE | Facility: CLINIC | Age: 78
End: 2023-03-27

## 2023-03-27 ENCOUNTER — LAB (OUTPATIENT)
Dept: LAB | Facility: CLINIC | Age: 78
End: 2023-03-27
Payer: COMMERCIAL

## 2023-03-27 DIAGNOSIS — E78.5 HYPERLIPIDEMIA LDL GOAL <70: ICD-10-CM

## 2023-03-27 DIAGNOSIS — E29.1 TESTICULAR HYPOFUNCTION: Primary | ICD-10-CM

## 2023-03-27 LAB
ALT SERPL W P-5'-P-CCNC: 13 U/L (ref 10–50)
CHOLEST SERPL-MCNC: 204 MG/DL
HDLC SERPL-MCNC: 67 MG/DL
LDLC SERPL CALC-MCNC: 109 MG/DL
NONHDLC SERPL-MCNC: 137 MG/DL
TRIGL SERPL-MCNC: 138 MG/DL

## 2023-03-27 PROCEDURE — 84460 ALANINE AMINO (ALT) (SGPT): CPT | Performed by: NURSE PRACTITIONER

## 2023-03-27 PROCEDURE — 36415 COLL VENOUS BLD VENIPUNCTURE: CPT | Performed by: NURSE PRACTITIONER

## 2023-03-27 PROCEDURE — 80061 LIPID PANEL: CPT | Performed by: NURSE PRACTITIONER

## 2023-03-27 NOTE — TELEPHONE ENCOUNTER
Dr. Marquez,    Looks like the patient had labs done today.  Wanting a PSA order added. Misti HAYWOOD RN

## 2023-03-27 NOTE — RESULT ENCOUNTER NOTE
Lipids worsened from previous; ALT WNL. Done after increasing pravastatin to 80 mg every day on 1/27/23. Pt not at home when I called. Will call later to see if pt is taking higher dose pravastatin.

## 2023-03-27 NOTE — TELEPHONE ENCOUNTER
Order/Referral Request    Who is requesting: Patient    Orders being requested: PSA    Reason service is needed/diagnosis: Wants his PSA level    When are orders needed by: asap    Has this been discussed with Provider: No    Does patient have a preference on a Group/Provider/Facility? [facility]    Does patient have an appointment scheduled?: No    Where to send orders: Place orders within Epic    Okay to leave a detailed message?: Yes at Home number on file [phone] (home)

## 2023-03-28 NOTE — TELEPHONE ENCOUNTER
Talked with Pt's wife. In good understanding to schedule the appt. To get his PSA checked.    .Natalie Peng PSC

## 2023-03-30 DIAGNOSIS — I25.10 CORONARY ARTERY DISEASE INVOLVING NATIVE CORONARY ARTERY OF NATIVE HEART WITHOUT ANGINA PECTORIS: ICD-10-CM

## 2023-03-30 DIAGNOSIS — E78.5 HYPERLIPIDEMIA LDL GOAL <70: ICD-10-CM

## 2023-03-30 DIAGNOSIS — I25.10 NONOBSTRUCTIVE ATHEROSCLEROSIS OF CORONARY ARTERY: Primary | ICD-10-CM

## 2023-03-30 RX ORDER — EZETIMIBE 10 MG/1
10 TABLET ORAL DAILY
Qty: 30 TABLET | Refills: 11 | Status: SHIPPED | OUTPATIENT
Start: 2023-03-30 | End: 2024-03-27

## 2023-05-30 ENCOUNTER — LAB (OUTPATIENT)
Dept: LAB | Facility: CLINIC | Age: 78
End: 2023-05-30
Payer: COMMERCIAL

## 2023-05-30 DIAGNOSIS — I25.10 CORONARY ARTERY DISEASE INVOLVING NATIVE CORONARY ARTERY OF NATIVE HEART WITHOUT ANGINA PECTORIS: ICD-10-CM

## 2023-05-30 DIAGNOSIS — E78.5 HYPERLIPIDEMIA LDL GOAL <70: ICD-10-CM

## 2023-05-30 DIAGNOSIS — I25.10 NONOBSTRUCTIVE ATHEROSCLEROSIS OF CORONARY ARTERY: ICD-10-CM

## 2023-05-30 LAB
ALT SERPL W P-5'-P-CCNC: 27 U/L (ref 10–50)
CHOLEST SERPL-MCNC: 160 MG/DL
HDLC SERPL-MCNC: 73 MG/DL
LDLC SERPL CALC-MCNC: 68 MG/DL
NONHDLC SERPL-MCNC: 87 MG/DL
TRIGL SERPL-MCNC: 97 MG/DL

## 2023-05-30 PROCEDURE — 84460 ALANINE AMINO (ALT) (SGPT): CPT | Performed by: NURSE PRACTITIONER

## 2023-05-30 PROCEDURE — 80061 LIPID PANEL: CPT | Performed by: NURSE PRACTITIONER

## 2023-05-30 PROCEDURE — 36415 COLL VENOUS BLD VENIPUNCTURE: CPT | Performed by: NURSE PRACTITIONER

## 2023-06-29 ENCOUNTER — HOSPITAL ENCOUNTER (EMERGENCY)
Facility: CLINIC | Age: 78
Discharge: HOME | End: 2023-06-29
Attending: PHYSICIAN ASSISTANT | Admitting: PHYSICIAN ASSISTANT
Payer: COMMERCIAL

## 2023-06-29 ENCOUNTER — APPOINTMENT (OUTPATIENT)
Dept: ULTRASOUND IMAGING | Facility: CLINIC | Age: 78
End: 2023-06-29
Attending: PHYSICIAN ASSISTANT
Payer: COMMERCIAL

## 2023-06-29 ENCOUNTER — APPOINTMENT (OUTPATIENT)
Dept: GENERAL RADIOLOGY | Facility: CLINIC | Age: 78
End: 2023-06-29
Attending: PHYSICIAN ASSISTANT
Payer: COMMERCIAL

## 2023-06-29 VITALS
HEART RATE: 40 BPM | RESPIRATION RATE: 16 BRPM | TEMPERATURE: 98.1 F | OXYGEN SATURATION: 97 % | SYSTOLIC BLOOD PRESSURE: 150 MMHG | DIASTOLIC BLOOD PRESSURE: 62 MMHG

## 2023-06-29 DIAGNOSIS — R22.42 LOWER LEG MASS, LEFT: ICD-10-CM

## 2023-06-29 PROCEDURE — 73590 X-RAY EXAM OF LOWER LEG: CPT | Mod: LT

## 2023-06-29 PROCEDURE — 76882 US LMTD JT/FCL EVL NVASC XTR: CPT | Mod: LT

## 2023-06-29 PROCEDURE — 99212 OFFICE O/P EST SF 10 MIN: CPT | Performed by: PHYSICIAN ASSISTANT

## 2023-06-29 PROCEDURE — G0463 HOSPITAL OUTPT CLINIC VISIT: HCPCS | Mod: 25 | Performed by: PHYSICIAN ASSISTANT

## 2023-06-29 ASSESSMENT — ENCOUNTER SYMPTOMS
WOUND: 0
FEVER: 0
COLOR CHANGE: 0
CHILLS: 0

## 2023-06-29 ASSESSMENT — ACTIVITIES OF DAILY LIVING (ADL): ADLS_ACUITY_SCORE: 35

## 2023-06-29 NOTE — ED PROVIDER NOTES
History     Chief Complaint   Patient presents with     Leg Problem     HPI  Dusty Ruggiero is a 77 year old male who presents with a cyst on his left shin. Patient reports the bump appeared on 06/11/2023 (18 days ago) and has never grown in size. No known injury to the leg. No bug bites that he is aware of. Patient is not on blood thinners. Denies any new soaps, detergents, lotions, or medications. Denies any pain, redness, fevers, chills, or new rashes at this time.  Pt states he woke-up with more diffuse swelling to his left lateral leg with associated ecchymosis.  The swelling has decreased in size and is now more localized to a mass/cystic type size.  Pt denies any pain.  He is ambulating without difficulties.      Allergies:  Allergies   Allergen Reactions     Atenolol Shortness Of Breath     Other reaction(s): Unknown  Other reaction(s): Hard to breath     Atenolol Difficulty breathing     Rapid heart beat     Atorvastatin Muscle Pain (Myalgia)     Other reaction(s): Myalgias     Buspirone Difficulty breathing and Other (See Comments)     Other reaction(s): Unknown  Rapid heart beat     Coreg [Carvedilol] Dizziness     Gabapentin Other (See Comments)     Other reaction(s): Dizzy  Dizzy       Lyrica [Pregabalin] Other (See Comments)     Other reaction(s): dizziness  Dizzy       Benadryl [Diphenhydramine] Anxiety     Other reaction(s): excitablity     Diazepam Anxiety     Nausea and vomiting     Vistaril [Hydroxyzine] Anxiety     Other reaction(s): dizzy,HA, nausea         Problem List:    Patient Active Problem List    Diagnosis Date Noted     Nonobstructive atherosclerosis of coronary artery 01/27/2023     Priority: Medium     Premature atrial contractions 01/27/2023     Priority: Medium     Paroxysmal supraventricular tachycardia (H) 01/27/2023     Priority: Medium     Status post coronary angiogram 01/11/2021     Priority: Medium     Abnormal stress test 12/29/2020     Priority: Medium     Added  automatically from request for surgery [id number]       NSVT (nonsustained ventricular tachycardia) (H) 12/29/2020     Priority: Medium     Added automatically from request for surgery [id number]       Ventricular tachycardia (H) 11/11/2020     Priority: Medium     History of colectomy 08/21/2020     Priority: Medium     Esophageal reflux 10/17/2018     Priority: Medium     Benign essential hypertension 08/16/2018     Priority: Medium     Hereditary and idiopathic peripheral neuropathy 08/16/2018     Priority: Medium     Iron deficiency anemia due to chronic blood loss 08/16/2018     Priority: Medium     Major depressive disorder with single episode, in full remission (H) 08/16/2018     Priority: Medium     Started about 2017       Palpitations 08/16/2018     Priority: Medium     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     Priority: Medium     Disorder of right rotator cuff 08/16/2018     Priority: Medium     Hyperlipidemia LDL goal <70 10/02/2017     Priority: Medium     Benign prostatic hyperplasia with urinary obstruction 08/30/2013     Priority: Medium     Vitamin D deficiency 01/29/2013     Priority: Medium     Osteoarthritis 02/07/2009     Priority: Medium     DDD (degenerative disc disease), lumbar 02/07/2009     Priority: Medium     Degenerative arthritis of lumbar spine 02/07/2009     Priority: Medium     Ulcerative colitis (H) 02/07/2009     Priority: Medium     Anxiety state 12/06/2007     Priority: Medium     Knee joint replacement by other means 12/06/2007     Priority: Medium     Overview:   with Dr. Garcia 12-6-2007       Pure hypercholesterolemia 12/06/2007     Priority: Medium        Past Medical History:    Past Medical History:   Diagnosis Date     Generalized osteoarthrosis, unspecified site      Other anxiety states      Prostatitis, unspecified      Ulcerative colitis, unspecified      Unspecified essential hypertension        Past Surgical History:    Past Surgical History:   Procedure Laterality Date      ARTHROSCOPY SHOULDER, OPEN ROTATOR CUFF REPAIR, COMBINED Right 3/4/2016    Procedure: COMBINED ARTHROSCOPY SHOULDER, OPEN ROTATOR CUFF REPAIR;  Surgeon: Wily Kan MD;  Location: [facility] OR     CV LEFT HEART CATH N/A 2021    Procedure: Left Heart Cath;  Surgeon: Mike Camara MD;  Location:  HEART CARDIAC CATH LAB     FLEXIBLE SIGMOIDOSCOPY  1999     PHACOEMULSIFICATION CLEAR CORNEA WITH TORIC INTRAOCULAR LENS IMPLANT Right 2018    Procedure: PHACOEMULSIFICATION CLEAR CORNEA WITH TORIC INTRAOCULAR LENS IMPLANT;  Right Cataract Removal with Implant--Toric Lens;  Surgeon: Mike Collier MD;  Location: [facility] OR     PHACOEMULSIFICATION CLEAR CORNEA WITH TORIC INTRAOCULAR LENS IMPLANT Left 3/12/2018    Procedure: PHACOEMULSIFICATION CLEAR CORNEA WITH TORIC INTRAOCULAR LENS IMPLANT;  Left Cataract Removal with Implant--Toric lens;  Surgeon: Mike Collier MD;  Location: [facility] OR     SURGICAL HISTORY OF -       Colostomy  with ileostomy     SURGICAL HISTORY OF -   2000    Colostomy Reversal     SURGICAL HISTORY OF -       Left Shoulder Rotator Cuff Surgery       Family History:    Family History   Problem Relation Age of Onset     Skin Cancer Father      Breast Cancer Sister      Melanoma No family hx of        Social History:  Marital Status:   [2]  Social History     Tobacco Use     Smoking status: Former     Packs/day: 0.25     Years: 20.00     Pack years: 5.00     Types: Cigarettes     Quit date: 1995     Years since quittin.4     Smokeless tobacco: Never   Vaping Use     Vaping Use: Never used   Substance Use Topics     Alcohol use: No     Comment: stopped a couple of months     Drug use: No        Medications:    amLODIPine (NORVASC) 10 MG tablet  aspirin (ASA) 81 MG EC tablet  Biotin 32859 MCG TABS  Cholecalciferol (VITAMIN D3 PO)  Coenzyme Q10 (COQ-10 PO)  ezetimibe (ZETIA) 10 MG tablet  ferrous sulfate 325 (65 FE) MG tablet  LORazepam (ATIVAN) 1 MG  tablet  Naproxen Sodium (ALEVE PO)  omeprazole (PRILOSEC) 20 MG DR capsule  pravastatin (PRAVACHOL) 80 MG tablet  psyllium (METAMUCIL) 58.6 % packet  sertraline (ZOLOFT) 100 MG tablet  Zinc 30 MG TABS        Review of Systems   Constitutional: Negative for chills and fever.   Skin: Negative for color change, pallor, rash and wound.        Cyst on left shin   All other systems reviewed and are negative.      Physical Exam   BP: (!) 150/62  Pulse: (!) 40 (pt states this is his ave pulse 40-50)  Temp: 98.1  F (36.7  C)  Resp: 16  SpO2: 97 %      Physical Exam  Constitutional:       General: He is not in acute distress.     Appearance: He is well-developed. He is not ill-appearing, toxic-appearing or diaphoretic.   HENT:      Head: Normocephalic and atraumatic.   Eyes:      Conjunctiva/sclera: Conjunctivae normal.   Cardiovascular:      Pulses: Normal pulses.   Pulmonary:      Effort: Pulmonary effort is normal.   Musculoskeletal:         General: Normal range of motion.      Cervical back: Neck supple.      Left knee: Normal. No swelling or bony tenderness. Normal range of motion.      Right lower leg: Normal. No tenderness.      Left lower leg: Normal. No swelling, tenderness or bony tenderness. No edema.      Right ankle: Normal.      Left ankle: Normal. No swelling. No tenderness. Normal range of motion.      Left foot: Normal. Normal range of motion. No swelling or tenderness.      Comments: Localized soft tissue mobile mass to left lateral lower leg without overlying erythema or skin changes.  Area is nontender to palpation.  No fluctuance or drainage. No ecchymosis.  No associated swelling or edema of the lower leg.  No calf tenderness to palpation.     Skin:     General: Skin is warm and dry.      Capillary Refill: Capillary refill takes less than 2 seconds.      Findings: No abscess, bruising, ecchymosis, erythema or rash.   Neurological:      General: No focal deficit present.      Mental Status: He is alert.       Sensory: Sensation is intact.      Motor: Motor function is intact.      Gait: Gait is intact.         ED Course                 Procedures              Results for orders placed or performed during the hospital encounter of 06/29/23 (from the past 24 hour(s))   XR Tibia and Fibula Left 2 Views    Narrative    TIBIA AND FIBULA LEFT TWO VIEWS June 29, 2023 12:50 PM     INDICATION: Left lower leg mass after trauma.     COMPARISON: None.      Impression    IMPRESSION:  1.  No fracture or joint malalignment.  2.  Left knee degenerative arthrosis with medial compartment  narrowing.  3.  Degenerative changes in the midfoot noted.  4.  No focal soft tissue swelling is evident radiographically.    GARRICK SEAMAN MD         SYSTEM ID:  [id number]   US Lower Extremity Non Vascular Left    Narrative    US LOWER EXTREMITY NON VASCULAR LEFT  6/29/2023 1:46 PM    HISTORY:  mass to left lateral leg, unknown trauma, not on blood  thinners, no overlying skin changes, hematoma?    COMPARISON: 6/29/2023 radiographs.    TECHNIQUE: Ultrasound examination of the left tib-fib region in the  area of palpable abnormality.      Impression    IMPRESSION: Complex cystic masslike area in the subcutaneous tissues  of the lateral mid leg measures 4.3 x 0.9 x 3.6 cm in size. Findings  would be consistent with a hematoma/seroma. Infection could have this  appearance but is considered less likely given the lack of adjacent  subcutaneous edema. No intramuscular abnormality.    CINDY JACKSON MD         SYSTEM ID:  [id number]       Medications - No data to display    Assessments & Plan (with Medical Decision Making)     I have reviewed the nursing notes.    I have reviewed the findings, diagnosis, plan and need for follow up with the patient.  Pt is a 77 year old male who presents with a cyst on his left shin. Patient reports the bump appeared on 06/11/2023 (18 days ago) and has never grown in size. No known injury to the leg. Patient is not on  blood thinners. Pt states he woke-up with more diffuse swelling to his left lateral leg with associated ecchymosis.  The swelling has decreased in size and is now more localized to a mass/cystic type size.  Pt denies any pain.  He is ambulating without difficulties.    Pt is afebrile on arrival.  Exam as above.  X-rays of left tib-fib were negative for fracture or malalignment.  No abnormal bony mass noted.  Ultrasound of left lower extremity shows a complex cystic masslike area in the subcutaneous tissue of the left lateral mid leg consistent with a hematoma versus seroma.  Patient has no overlying erythema or surrounding swelling and lacks adjacent subcutaneous edema making suspicion for abscess less likely.  No intramuscular abnormality noted.  Discussed results with patient.  Encouraged symptomatic treatments at home.  Return precautions were reviewed.  Hand-outs were provided.    Patient was instructed to follow-up with PCP for continued care and management.  He is to return to the ED for persistent and/or worsening symptoms.  Patient expressed understanding of the diagnosis and plan and was discharged home in good condition.    I, Tammie Meyers, have reviewed and discussed with the advanced practice provider student, Penny SHAW, their history, physical, and plan for Dusty Ruggiero. I participated in a shared visit by interviewing and examining the patient as well.  I have reviewed, added to, and edited the note as necessary.    Tammie Meyers PA-C    Date of Service (when I saw the patient): 06/29/23  I personally evaluated this patient today.      Patient is noted to be bradycardic in the department.  Patient states that this is not new for him.  He is asymptomatic from this standpoint and is currently following with primary care and cardiology for this.      Discharge Medication List as of 6/29/2023  2:03 PM          Final diagnoses:   Lower leg mass, left - hematoma versus seroma       6/29/2023    [facility] EMERGENCY DEPT      Disclaimer:  This note consists of symbols derived from keyboarding, dictation and/or voice recognition software.  As a result, there may be errors in the script that have gone undetected.  Please consider this when interpreting information found in this chart.     Tammie Meyers PA-C  06/29/23 2037       Tammie Meyers PA-C  06/29/23 2039

## 2023-06-29 NOTE — ED TRIAGE NOTES
Pt reports a hard lump on the left leg that appeared on 6/11/23. Has not seen a provider yet in regards to it. Denies injury, bug bite. Denies taking blood thinning medication.

## 2023-08-28 ENCOUNTER — OFFICE VISIT (OUTPATIENT)
Dept: FAMILY MEDICINE | Facility: CLINIC | Age: 78
End: 2023-08-28
Payer: COMMERCIAL

## 2023-08-28 VITALS
SYSTOLIC BLOOD PRESSURE: 138 MMHG | RESPIRATION RATE: 20 BRPM | WEIGHT: 168 LBS | BODY MASS INDEX: 24.88 KG/M2 | HEART RATE: 41 BPM | TEMPERATURE: 98.2 F | HEIGHT: 69 IN | DIASTOLIC BLOOD PRESSURE: 74 MMHG | OXYGEN SATURATION: 98 %

## 2023-08-28 DIAGNOSIS — I10 BENIGN HYPERTENSION: ICD-10-CM

## 2023-08-28 DIAGNOSIS — F32.5 MAJOR DEPRESSIVE DISORDER WITH SINGLE EPISODE, IN FULL REMISSION (H): Primary | ICD-10-CM

## 2023-08-28 DIAGNOSIS — F41.1 ANXIETY STATE: ICD-10-CM

## 2023-08-28 DIAGNOSIS — Z23 NEED FOR TDAP VACCINATION: ICD-10-CM

## 2023-08-28 PROCEDURE — 99214 OFFICE O/P EST MOD 30 MIN: CPT | Performed by: FAMILY MEDICINE

## 2023-08-28 RX ORDER — SERTRALINE HYDROCHLORIDE 100 MG/1
TABLET, FILM COATED ORAL
Qty: 90 TABLET | Refills: 3 | Status: SHIPPED | OUTPATIENT
Start: 2023-08-28 | End: 2024-08-28

## 2023-08-28 RX ORDER — AMLODIPINE BESYLATE 10 MG/1
10 TABLET ORAL DAILY
Qty: 90 TABLET | Refills: 3 | Status: SHIPPED | OUTPATIENT
Start: 2023-08-28 | End: 2024-08-28

## 2023-08-28 RX ORDER — SERTRALINE HYDROCHLORIDE 100 MG/1
TABLET, FILM COATED ORAL
Qty: 180 TABLET | Refills: 3 | Status: SHIPPED | OUTPATIENT
Start: 2023-08-28 | End: 2023-08-28

## 2023-08-28 RX ORDER — LORAZEPAM 1 MG/1
0.5 TABLET ORAL 3 TIMES DAILY PRN
Qty: 45 TABLET | Refills: 5 | Status: SHIPPED | OUTPATIENT
Start: 2023-08-28 | End: 2024-02-28

## 2023-08-28 ASSESSMENT — ANXIETY QUESTIONNAIRES
7. FEELING AFRAID AS IF SOMETHING AWFUL MIGHT HAPPEN: NOT AT ALL
GAD7 TOTAL SCORE: 6
2. NOT BEING ABLE TO STOP OR CONTROL WORRYING: SEVERAL DAYS
GAD7 TOTAL SCORE: 6
IF YOU CHECKED OFF ANY PROBLEMS ON THIS QUESTIONNAIRE, HOW DIFFICULT HAVE THESE PROBLEMS MADE IT FOR YOU TO DO YOUR WORK, TAKE CARE OF THINGS AT HOME, OR GET ALONG WITH OTHER PEOPLE: NOT DIFFICULT AT ALL
4. TROUBLE RELAXING: SEVERAL DAYS
3. WORRYING TOO MUCH ABOUT DIFFERENT THINGS: SEVERAL DAYS
1. FEELING NERVOUS, ANXIOUS, OR ON EDGE: NEARLY EVERY DAY
6. BECOMING EASILY ANNOYED OR IRRITABLE: NOT AT ALL
5. BEING SO RESTLESS THAT IT IS HARD TO SIT STILL: NOT AT ALL

## 2023-08-28 ASSESSMENT — PATIENT HEALTH QUESTIONNAIRE - PHQ9
10. IF YOU CHECKED OFF ANY PROBLEMS, HOW DIFFICULT HAVE THESE PROBLEMS MADE IT FOR YOU TO DO YOUR WORK, TAKE CARE OF THINGS AT HOME, OR GET ALONG WITH OTHER PEOPLE: NOT DIFFICULT AT ALL
SUM OF ALL RESPONSES TO PHQ QUESTIONS 1-9: 4
SUM OF ALL RESPONSES TO PHQ QUESTIONS 1-9: 4

## 2023-08-28 ASSESSMENT — PAIN SCALES - GENERAL: PAINLEVEL: MILD PAIN (3)

## 2023-08-28 NOTE — PROGRESS NOTES
Assessment & Plan     Major depressive disorder with single episode, in full remission (H)  Anxiety state  -- Sertraline 50 mg twice daily  -- Lorazepam as needed. Using 3 times daily. Wishes to continue on current medication regimen. Understands risks of lorazepam and wishes to continue.   - LORazepam (ATIVAN) 1 MG tablet  Dispense: 45 tablet; Refill: 5  - sertraline (ZOLOFT) 100 MG tablet  Dispense: 180 tablet; Refill: 3    Benign hypertension  Stable, refill provided.   - amLODIPine (NORVASC) 10 MG tablet  Dispense: 90 tablet; Refill: 3    Need for Tdap vaccination  - Tdap, tetanus-diptheria-acell pertussis, (BOOSTRIX) 5-2.5-18.5 LF-MCG/0.5 ANDREAS injection  Dispense: 0.5 mL; Refill: 0      The risks, benefits and treatment options of prescribed medications or other treatments have been discussed with the patient. The patient verbalized their understanding and should call or follow up if no improvement or if they develop further problems.    Follow up in 6 months or sooner if needed.     Vazquez Marquez, [facility] ANNEMARIE Montano is a 78 year old, presenting for the following health issues:  Depression and Anxiety        8/28/2023     9:07 AM   Additional Questions   Roomed by Clarice REINA       History of Present Illness       Mental Health Follow-up:  Patient presents to follow-up on Depression & Anxiety.Patient's depression since last visit has been:  No change  The patient is not having other symptoms associated with depression.  Patient's anxiety since last visit has been:  No change  The patient is having other symptoms associated with anxiety.  Any significant life events: No  Patient is feeling anxious or having panic attacks.  Patient has no concerns about alcohol or drug use.    He eats 2-3 servings of fruits and vegetables daily.He consumes 2 sweetened beverage(s) daily.He exercises with enough effort to increase his heart rate 9 or less minutes per day.  He exercises with  "enough effort to increase his heart rate 3 or less days per week.   He is taking medications regularly.       78 year old male who presents to clinic for follow up regarding depression and anxiety.       Sertraline 50 mg in the a.m., 50 mg in the p.m.  Lorazepam  use this 3 times per day.   Reports this helps quite a bit.   Wishes to continue these current medications.   Tolerating without side effects.     HTN  CAD  Follows with Cardiology.   Amlodipine 10 mg daily.         Social History     Tobacco Use    Smoking status: Former     Packs/day: 0.25     Years: 20.00     Pack years: 5.00     Types: Cigarettes     Quit date: 1995     Years since quittin.5    Smokeless tobacco: Never   Vaping Use    Vaping Use: Never used   Substance Use Topics    Alcohol use: No     Comment: stopped a couple of months    Drug use: No         2022     9:03 AM 2023    10:02 AM 2023     9:04 AM   PHQ   PHQ-9 Total Score 0 0 4   Q9: Thoughts of better off dead/self-harm past 2 weeks Not at all Not at all Not at all         2022     9:03 AM 2023    10:02 AM 2023     9:07 AM   NICOLÁS-7 SCORE   Total Score 0 (minimal anxiety)  6 (mild anxiety)   Total Score 0 1 6       Review of Systems   Constitutional, HEENT, cardiovascular, pulmonary, gi and gu systems are negative, except as otherwise noted.      Objective    /74   Pulse (!) 41   Temp 98.2  F (36.8  C) (Tympanic)   Resp 20   Ht 1.759 m (5' 9.25\")   Wt 76.2 kg (168 lb)   SpO2 98%   BMI 24.63 kg/m    Body mass index is 24.63 kg/m .  Physical Exam   General: alert, cooperative, no acute distress   CV: bradycardia  Resp: non-labored breathing, clear to auscultation, no wheezing or rales   Extremities: No peripheral edema, calves non-tender.   Appearance: Patient appears well groomed and appropriately dressed.   Orientation: Patient alert and oriented to person, place, and time.  Behavior: Appropriate to setting and is cooperative with " mental status examination. No overactivity or catatonia.  Speech: Organized, clear, and concise, with appropriate volume and tone.  Thought: Able to think abstractly. No evidence of tangentiality or circumstantiality.  Perception: No auditory or visual hallucinations. No delusions.  Mood: No evidence of euphoria or dysphoria. No lability.  Affect: Congruent to mood. Not flattened or narrowed.  Insight/Judgement: Insight and judgment intact.

## 2023-11-08 DIAGNOSIS — K21.00 GASTROESOPHAGEAL REFLUX DISEASE WITH ESOPHAGITIS WITHOUT HEMORRHAGE: ICD-10-CM

## 2023-11-15 ENCOUNTER — TRANSCRIBE ORDERS (OUTPATIENT)
Dept: OTHER | Age: 78
End: 2023-11-15

## 2023-11-15 DIAGNOSIS — M54.50 CHRONIC RIGHT-SIDED LOW BACK PAIN: Primary | ICD-10-CM

## 2023-11-15 DIAGNOSIS — G89.29 CHRONIC RIGHT-SIDED LOW BACK PAIN: Primary | ICD-10-CM

## 2023-11-15 DIAGNOSIS — M47.816 LUMBAR FACET ARTHROPATHY: ICD-10-CM

## 2023-11-20 DIAGNOSIS — E78.2 MIXED HYPERLIPIDEMIA: ICD-10-CM

## 2023-11-20 RX ORDER — PRAVASTATIN SODIUM 80 MG/1
80 TABLET ORAL EVERY EVENING
Qty: 90 TABLET | Refills: 1 | Status: SHIPPED | OUTPATIENT
Start: 2023-11-20 | End: 2024-03-27

## 2023-12-01 ENCOUNTER — THERAPY VISIT (OUTPATIENT)
Dept: PHYSICAL THERAPY | Facility: CLINIC | Age: 78
End: 2023-12-01
Attending: STUDENT IN AN ORGANIZED HEALTH CARE EDUCATION/TRAINING PROGRAM
Payer: COMMERCIAL

## 2023-12-01 DIAGNOSIS — M54.50 CHRONIC RIGHT-SIDED LOW BACK PAIN: ICD-10-CM

## 2023-12-01 DIAGNOSIS — G89.29 CHRONIC RIGHT-SIDED LOW BACK PAIN: ICD-10-CM

## 2023-12-01 DIAGNOSIS — M47.816 LUMBAR FACET ARTHROPATHY: ICD-10-CM

## 2023-12-01 PROCEDURE — 97161 PT EVAL LOW COMPLEX 20 MIN: CPT | Mod: GP | Performed by: PHYSICAL MEDICINE & REHABILITATION

## 2023-12-01 PROCEDURE — 97110 THERAPEUTIC EXERCISES: CPT | Mod: GP | Performed by: PHYSICAL MEDICINE & REHABILITATION

## 2023-12-01 NOTE — PROGRESS NOTES
PHYSICAL THERAPY EVALUATION  Type of Visit: Evaluation    See electronic medical record for Abuse and Falls Screening details.    Subjective       Presenting condition or subjective complaint:   Patient reports ongoing right sided LBP for over 20 years now. Overall he is typically very active with gardening and yard work. States as the day progresses the pain increases. Takes Alieve 3x/day for pain relief and ices daily. Has to be done by around 5pm due to pain. Has had several cortisone shots in his lower back on both sides. Denies radiating pain and n/t symptoms.   Date of onset: 12/01/03    Relevant medical history:   Arthritis; Dizziness; HTN; OA; sleep apnea  Dates & types of surgery:   RTC repairs 2000 and 2016; R RTKA 2007    Prior diagnostic imaging/testing results:     yes, MRI/XR   Prior therapy history for the same diagnosis, illness or injury:     no    Prior Level of Function  Transfers: Independent  Ambulation: Independent  ADL: Independent    Employment:     retired  Hobbies/Interests:   gardening    Patient goals for therapy:   remain active with less pain       Objective   LUMBAR SPINE EVALUATION  PAIN: Pain Level at Rest: 2/10  Pain Level with Use: 8/10  Pain Location: lumbar spine  Pain Quality: Aching, Burning, and Dull  Pain Frequency: intermittent or daily  Pain is Exacerbated By: bending, lifting/carrying, walking  Pain is Relieved By: NSAIDs, otc medications, and rest  INTEGUMENTARY (edema, incisions): WFL  POSTURE: Standing Posture: Lordosis increased, Thoracic kyphosis increased, Lateral shift  GAIT:   Weightbearing Status: WBAT  Assistive Device(s): None  Gait Deviations: Decreased pelvic rotation  BALANCE/PROPRIOCEPTION: WFL  WEIGHTBEARING ALIGNMENT: Lumbar/Pelvic WB Alignment:Convex scoliosis L  ROM:   (Degrees) Left AROM Left PROM  Right AROM Right PROM   Hip Flexion       Hip Extension       Hip Abduction       Hip Adduction       Hip Internal Rotation 25*  15*    Hip External Rotation  40*  35*    Knee Flexion       Knee Extension       Lumbar Side glide 40% limited 20% limited   Lumbar Flexion WNL   Lumbar Extension 50% limited     PELVIC/SI SCREEN: WFL  STRENGTH:  4-/5 R hip ABD; 4/5 R hip extension; 4+/5 R hip flexion; full R knee strength; fair (-) core  MYOTOMES: WNL  DTR S: WNL  CORD SIGNS: WNL  DERMATOMES: WNL  NEURAL TENSION: Lumbar WNL  FLEXIBILITY: Decreased hip IR L, Decreased piriformis L, Decreased hip IR R, Decreased hip ER R, Decreased piriformis R, Decreased hamstrings R  LUMBAR/HIP Special Tests:  negative SLR and Slump tests; positive R quadrant test    FUNCTIONAL TESTS: Double Leg Squat: Anterior knee translation, Knee valgus, Hip internal rotation, Contralateral hip drop, Increased trunk lean, and Improper use of glutes/hips  PALPATION:  R iliolumbar ligament TTP  SPINAL SEGMENTAL CONCLUSIONS: FRS R L4-S1; FRS L L3-4    Assessment & Plan   CLINICAL IMPRESSIONS  Medical Diagnosis: Chronic right-sided low back pain (M54.50, G89.29)  - Primary; Lumbar facet arthropathy (M47.816)    Treatment Diagnosis: low back pain   Impression/Assessment: Patient is a 78 year old male with low back pain complaints.  The following significant findings have been identified: Pain, Decreased ROM/flexibility, Decreased joint mobility, Decreased strength, Impaired muscle performance, and Decreased activity tolerance. These impairments interfere with their ability to perform self care tasks, recreational activities, household chores, and community mobility as compared to previous level of function.     Clinical Decision Making (Complexity):  Clinical Presentation: Stable/Uncomplicated  Clinical Presentation Rationale: based on medical and personal factors listed in PT evaluation  Clinical Decision Making (Complexity): Low complexity    PLAN OF CARE  Treatment Interventions:  Modalities: Biofeedback  Interventions: Gait Training, Manual Therapy, Neuromuscular Re-education, Therapeutic Activity,  Therapeutic Exercise    Long Term Goals     PT Goal 1  Goal Identifier: STG  Goal Description: Patient to report lifting objects of 10 pounds from floor to countertop height with <3/10 LBP.  Target Date: 12/29/23  PT Goal 2  Goal Identifier: STG  Goal Description: Patient to walk one mile with <3/10 LBP to improve community mobility.  Target Date: 01/05/24  PT Goal 3  Goal Identifier: LTG  Goal Description: Patient to be IND with HEP to aid functional recovery and reduce future episode occurrance of LBP.  Target Date: 02/09/24      Frequency of Treatment: 1x/week  Duration of Treatment: 10 weeks    Education Assessment:   Learner/Method: Patient;Listening;Reading;Demonstration    Risks and benefits of evaluation/treatment have been explained.   Patient/Family/caregiver agrees with Plan of Care.     Evaluation Time:     PT Eval, Low Complexity Minutes (86848): 15     Signing Clinician: Puma Coyne, PT      [facility]                                                                                   OUTPATIENT PHYSICAL THERAPY      PLAN OF TREATMENT FOR OUTPATIENT REHABILITATION   Patient's Last Name, First Name, Dusty Bedoya YOB: 1945   Provider's Name   [facility]   Medical Record No.  [id number]     Onset Date: 12/01/03  Start of Care Date: 12/01/23     Medical Diagnosis:  Chronic right-sided low back pain (M54.50, G89.29)  - Primary; Lumbar facet arthropathy (M47.816)      PT Treatment Diagnosis:  low back pain Plan of Treatment  Frequency/Duration: 1x/week/ 10 weeks    Certification date from 12/01/23 to 02/09/24         See note for plan of treatment details and functional goals     Puma Coyne, PT                         I CERTIFY THE NEED FOR THESE SERVICES FURNISHED UNDER        THIS PLAN OF TREATMENT AND WHILE UNDER MY CARE     (Physician attestation of this document indicates review and certification of the  therapy plan).              Referring Provider:  Jose Chow    Initial Assessment  See Epic Evaluation- Start of Care Date: 12/01/23

## 2023-12-07 ENCOUNTER — THERAPY VISIT (OUTPATIENT)
Dept: PHYSICAL THERAPY | Facility: CLINIC | Age: 78
End: 2023-12-07
Attending: STUDENT IN AN ORGANIZED HEALTH CARE EDUCATION/TRAINING PROGRAM
Payer: COMMERCIAL

## 2023-12-07 DIAGNOSIS — M54.50 CHRONIC RIGHT-SIDED LOW BACK PAIN: Primary | ICD-10-CM

## 2023-12-07 DIAGNOSIS — G89.29 CHRONIC RIGHT-SIDED LOW BACK PAIN: Primary | ICD-10-CM

## 2023-12-07 PROCEDURE — 97110 THERAPEUTIC EXERCISES: CPT | Mod: GP

## 2023-12-14 ENCOUNTER — THERAPY VISIT (OUTPATIENT)
Dept: PHYSICAL THERAPY | Facility: CLINIC | Age: 78
End: 2023-12-14
Attending: STUDENT IN AN ORGANIZED HEALTH CARE EDUCATION/TRAINING PROGRAM
Payer: COMMERCIAL

## 2023-12-14 DIAGNOSIS — M47.816 LUMBAR FACET ARTHROPATHY: Primary | ICD-10-CM

## 2023-12-14 PROCEDURE — 97110 THERAPEUTIC EXERCISES: CPT | Mod: GP | Performed by: PHYSICAL MEDICINE & REHABILITATION

## 2023-12-21 ENCOUNTER — THERAPY VISIT (OUTPATIENT)
Dept: PHYSICAL THERAPY | Facility: CLINIC | Age: 78
End: 2023-12-21
Attending: STUDENT IN AN ORGANIZED HEALTH CARE EDUCATION/TRAINING PROGRAM
Payer: COMMERCIAL

## 2023-12-21 DIAGNOSIS — G89.29 CHRONIC RIGHT-SIDED LOW BACK PAIN: ICD-10-CM

## 2023-12-21 DIAGNOSIS — M47.816 LUMBAR FACET ARTHROPATHY: Primary | ICD-10-CM

## 2023-12-21 DIAGNOSIS — M54.50 CHRONIC RIGHT-SIDED LOW BACK PAIN: ICD-10-CM

## 2023-12-21 PROCEDURE — 97110 THERAPEUTIC EXERCISES: CPT | Mod: GP | Performed by: PHYSICAL MEDICINE & REHABILITATION

## 2023-12-28 ENCOUNTER — HOSPITAL ENCOUNTER (EMERGENCY)
Facility: CLINIC | Age: 78
Discharge: HOME | End: 2023-12-28
Payer: COMMERCIAL

## 2023-12-28 VITALS
HEART RATE: 90 BPM | TEMPERATURE: 98.4 F | WEIGHT: 175 LBS | DIASTOLIC BLOOD PRESSURE: 83 MMHG | HEIGHT: 69 IN | RESPIRATION RATE: 15 BRPM | OXYGEN SATURATION: 94 % | SYSTOLIC BLOOD PRESSURE: 156 MMHG | BODY MASS INDEX: 25.92 KG/M2

## 2023-12-28 DIAGNOSIS — B33.8 RSV INFECTION: ICD-10-CM

## 2023-12-28 LAB
FLUAV RNA SPEC QL NAA+PROBE: NEGATIVE
FLUBV RNA RESP QL NAA+PROBE: NEGATIVE
RSV RNA SPEC NAA+PROBE: POSITIVE
SARS-COV-2 RNA RESP QL NAA+PROBE: NEGATIVE

## 2023-12-28 PROCEDURE — G0463 HOSPITAL OUTPT CLINIC VISIT: HCPCS

## 2023-12-28 PROCEDURE — 87637 SARSCOV2&INF A&B&RSV AMP PRB: CPT | Performed by: PHYSICIAN ASSISTANT

## 2023-12-28 PROCEDURE — 99214 OFFICE O/P EST MOD 30 MIN: CPT

## 2023-12-28 RX ORDER — BENZONATATE 200 MG/1
200 CAPSULE ORAL 3 TIMES DAILY PRN
Qty: 30 CAPSULE | Refills: 0 | Status: SHIPPED | OUTPATIENT
Start: 2023-12-28 | End: 2024-08-28

## 2023-12-28 ASSESSMENT — ACTIVITIES OF DAILY LIVING (ADL): ADLS_ACUITY_SCORE: 35

## 2023-12-28 NOTE — ED PROVIDER NOTES
History   No chief complaint on file.    HPI  Dusty Ruggiero is a 78 year old male who presents urgent care for concern of cough, sinus congestion, loss of taste and smell.  Patient reports symptoms began about 5 days ago.  Patient's wife had had similar symptoms about 2 weeks ago tested negative for COVID, influenza, and RSV.  Patient denies any shortness of breath.  He has not noted any wheezing.  He denies a sore throat or significant ear pain.  He has not had any nausea, vomiting, diarrhea, or abdominal pain.  He has been using over-the-counter cough and cold medications without any significant relief in his symptoms.  He denies any other significant concerns today.    Allergies:  Allergies   Allergen Reactions    Atenolol Shortness Of Breath     Other reaction(s): Unknown  Other reaction(s): Hard to breath    Atenolol Difficulty breathing     Rapid heart beat    Atorvastatin Muscle Pain (Myalgia)     Other reaction(s): Myalgias    Buspirone Difficulty breathing and Other (See Comments)     Other reaction(s): Unknown  Rapid heart beat    Coreg [Carvedilol] Dizziness    Gabapentin Other (See Comments)     Other reaction(s): Dizzy  Dizzy      Lyrica [Pregabalin] Other (See Comments)     Other reaction(s): dizziness  Dizzy      Benadryl [Diphenhydramine] Anxiety     Other reaction(s): excitablity    Diazepam Anxiety     Nausea and vomiting    Vistaril [Hydroxyzine] Anxiety     Other reaction(s): dizzy,HA, nausea         Problem List:    Patient Active Problem List    Diagnosis Date Noted    Chronic right-sided low back pain 12/01/2023     Priority: Medium    Nonobstructive atherosclerosis of coronary artery 01/27/2023     Priority: Medium    Premature atrial contractions 01/27/2023     Priority: Medium    Paroxysmal supraventricular tachycardia 01/27/2023     Priority: Medium    Status post coronary angiogram 01/11/2021     Priority: Medium    Abnormal stress test 12/29/2020     Priority: Medium     Added  automatically from request for surgery [id number]      NSVT (nonsustained ventricular tachycardia) (H) 12/29/2020     Priority: Medium     Added automatically from request for surgery [id number]      Ventricular tachycardia (H) 11/11/2020     Priority: Medium    History of colectomy 08/21/2020     Priority: Medium    Esophageal reflux 10/17/2018     Priority: Medium    Benign essential hypertension 08/16/2018     Priority: Medium    Hereditary and idiopathic peripheral neuropathy 08/16/2018     Priority: Medium    Iron deficiency anemia due to chronic blood loss 08/16/2018     Priority: Medium    Major depressive disorder with single episode, in full remission (H24) 08/16/2018     Priority: Medium     Started about 2017      Palpitations 08/16/2018     Priority: Medium    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     Priority: Medium    Disorder of right rotator cuff 08/16/2018     Priority: Medium    Hyperlipidemia LDL goal <70 10/02/2017     Priority: Medium    Benign prostatic hyperplasia with urinary obstruction 08/30/2013     Priority: Medium    Vitamin D deficiency 01/29/2013     Priority: Medium    Osteoarthritis 02/07/2009     Priority: Medium    DDD (degenerative disc disease), lumbar 02/07/2009     Priority: Medium    Lumbar facet arthropathy 02/07/2009     Priority: Medium    Ulcerative colitis (H) 02/07/2009     Priority: Medium    Anxiety state 12/06/2007     Priority: Medium    Knee joint replacement by other means 12/06/2007     Priority: Medium     Overview:   with Dr. Garcia 12-6-2007      Pure hypercholesterolemia 12/06/2007     Priority: Medium        Past Medical History:    Past Medical History:   Diagnosis Date    Generalized osteoarthrosis, unspecified site     Other anxiety states     Prostatitis, unspecified     Ulcerative colitis, unspecified     Unspecified essential hypertension        Past Surgical History:    Past Surgical History:   Procedure Laterality Date    ARTHROSCOPY SHOULDER, OPEN ROTATOR  CUFF REPAIR, COMBINED Right 3/4/2016    Procedure: COMBINED ARTHROSCOPY SHOULDER, OPEN ROTATOR CUFF REPAIR;  Surgeon: Wily Kan MD;  Location: [facility]    CV LEFT HEART CATH N/A 2021    Procedure: Left Heart Cath;  Surgeon: Mike Camara MD;  Location:  HEART CARDIAC CATH LAB    FLEXIBLE SIGMOIDOSCOPY  1999    PHACOEMULSIFICATION CLEAR CORNEA WITH TORIC INTRAOCULAR LENS IMPLANT Right 2018    Procedure: PHACOEMULSIFICATION CLEAR CORNEA WITH TORIC INTRAOCULAR LENS IMPLANT;  Right Cataract Removal with Implant--Toric Lens;  Surgeon: Mike Collier MD;  Location: [facility] OR    PHACOEMULSIFICATION CLEAR CORNEA WITH TORIC INTRAOCULAR LENS IMPLANT Left 3/12/2018    Procedure: PHACOEMULSIFICATION CLEAR CORNEA WITH TORIC INTRAOCULAR LENS IMPLANT;  Left Cataract Removal with Implant--Toric lens;  Surgeon: Mike Collier MD;  Location: [facility] OR    SURGICAL HISTORY OF -       Colostomy  with ileostomy    SURGICAL HISTORY OF -       Colostomy Reversal    SURGICAL HISTORY OF -       Left Shoulder Rotator Cuff Surgery       Family History:    Family History   Problem Relation Age of Onset    Skin Cancer Father     Breast Cancer Sister     Melanoma No family hx of        Social History:  Marital Status:   [2]  Social History     Tobacco Use    Smoking status: Former     Packs/day: 0.25     Years: 20.00     Additional pack years: 0.00     Total pack years: 5.00     Types: Cigarettes     Quit date: 1995     Years since quittin.9    Smokeless tobacco: Never   Vaping Use    Vaping Use: Never used   Substance Use Topics    Alcohol use: No     Comment: stopped a couple of months    Drug use: No        Medications:    benzonatate (TESSALON) 200 MG capsule  amLODIPine (NORVASC) 10 MG tablet  aspirin (ASA) 81 MG EC tablet  Biotin 10454 MCG TABS  Cholecalciferol (VITAMIN D3 PO)  Coenzyme Q10 (COQ-10 PO)  ezetimibe (ZETIA) 10 MG tablet  ferrous sulfate 325 (65 FE) MG  "tablet  LORazepam (ATIVAN) 1 MG tablet  Naproxen Sodium (ALEVE PO)  omeprazole (PRILOSEC) 20 MG DR capsule  pravastatin (PRAVACHOL) 80 MG tablet  psyllium (METAMUCIL) 58.6 % packet  sertraline (ZOLOFT) 100 MG tablet  Zinc 30 MG TABS          Review of Systems   All other systems reviewed and are negative.  See HPI    Physical Exam   BP: (!) 156/83  Pulse: 90  Temp: 98.4  F (36.9  C)  Resp: 15  Height: 175.9 cm (5' 9.24\")  Weight: 79.4 kg (175 lb)  SpO2: 94 %      Physical Exam  Constitutional:       General: He is not in acute distress.     Appearance: Normal appearance. He is not toxic-appearing.   HENT:      Head: Normocephalic and atraumatic.      Right Ear: Tympanic membrane and ear canal normal.      Left Ear: Tympanic membrane and ear canal normal.      Nose: Congestion present. No rhinorrhea.      Mouth/Throat:      Mouth: Mucous membranes are moist.      Pharynx: Oropharynx is clear. No oropharyngeal exudate.   Eyes:      General: No scleral icterus.     Conjunctiva/sclera: Conjunctivae normal.   Cardiovascular:      Rate and Rhythm: Normal rate.      Heart sounds: Normal heart sounds. No murmur heard.  Pulmonary:      Effort: Pulmonary effort is normal. No respiratory distress.      Breath sounds: No stridor. Examination of the right-upper field reveals wheezing. Examination of the left-upper field reveals wheezing. Wheezing present. No decreased breath sounds, rhonchi or rales.   Abdominal:      General: Bowel sounds are normal.      Palpations: Abdomen is soft.      Tenderness: There is no abdominal tenderness.   Musculoskeletal:         General: No deformity.      Cervical back: Neck supple. No rigidity.   Lymphadenopathy:      Cervical: No cervical adenopathy.   Skin:     General: Skin is warm.      Capillary Refill: Capillary refill takes less than 2 seconds.      Coloration: Skin is not jaundiced.   Neurological:      Mental Status: He is alert.         ED Course                 Procedures     "     Results for orders placed or performed during the hospital encounter of 12/28/23 (from the past 24 hour(s))   Symptomatic Influenza A/B, RSV, & SARS-CoV2 PCR (COVID-19) Nasopharyngeal    Specimen: Nasopharyngeal; Swab   Result Value Ref Range    Influenza A PCR Negative Negative    Influenza B PCR Negative Negative    RSV PCR Positive (A) Negative    SARS CoV2 PCR Negative Negative    Narrative    Testing was performed using the Xpert Xpress CoV2/Flu/RSV Assay on the [employer] GeneXpert Instrument. This test should be ordered for the detection of SARS-CoV-2, influenza, and RSV viruses in individuals who meet clinical and/or epidemiological criteria. Test performance is unknown in asymptomatic patients. This test is for in vitro diagnostic use under the FDA EUA for laboratories certified under CLIA to perform high or moderate complexity testing. This test has not been FDA cleared or approved. A negative result does not rule out the presence of PCR inhibitors in the specimen or target RNA in concentration below the limit of detection for the assay. If only one viral target is positive but coinfection with multiple targets is suspected, the sample should be re-tested with another FDA cleared, approved, or authorized test, if coinfection would change clinical management. This test was validated by the [facility]. These laboratories are certified under the Clinical Laboratory Improvement Amendments of 1988 (CLIA-88) as qualified to perform high complexity laboratory testing.       Medications - No data to display    Assessments & Plan (with Medical Decision Making)   Patient presents to urgent care for concern of cough and URI symptoms.  The patient is afebrile on arrival and vital signs otherwise reassuring at this time.  He denies any shortness of breath and is not in any acute respiratory distress.  Some mild wheezing is heard in the upper lobes bilaterally, but lung sounds are otherwise normal  and clear throughout.  There is no indication for chest x-ray at this time.  I have low concern for a pneumonia and I am not concerned for pneumothorax, PE, or pleural effusions today.  Patient was revealed to be RSV positive.  Influenza and COVID are negative.  Patient is stable for discharge at this time.  Discussed symptomatic treatments and home cares with him.  Tessalon Perles as needed for symptomatic cough relief.  He can continue with over-the-counter medications as needed.  Flonase nasal spray also recommended.  Return precautions were reviewed.  The patient was discharged in good condition and he and his wife are agreeable to the above plan.    I have reviewed the nursing notes.    I have reviewed the findings, diagnosis, plan and need for follow up with the patient.      New Prescriptions    BENZONATATE (TESSALON) 200 MG CAPSULE    Take 1 capsule (200 mg) by mouth 3 times daily as needed for cough       Final diagnoses:   RSV infection       12/28/2023   [facility] EMERGENCY DEPT    Disclaimer:  This note consists of symbols derived from keyboarding, dictation and/or voice recognition software.  As a result, there may be errors in the script that have gone undetected.  Please consider this when interpreting information found in this chart.         Kwasi Abbasi, AURORA CNP  12/28/23 3725

## 2023-12-28 NOTE — DISCHARGE INSTRUCTIONS
Please return for new or worsening symptoms or if not improving.  You can use Flonase nasal spray and I did also prescribe some Tessalon Perles for your cough.  You can continue with over-the-counter medications as needed as well.  Expect that you should start to feel better in a few days.

## 2024-01-06 ENCOUNTER — HOSPITAL ENCOUNTER (EMERGENCY)
Facility: CLINIC | Age: 79
Discharge: HOME | End: 2024-01-06
Attending: PHYSICIAN ASSISTANT | Admitting: PHYSICIAN ASSISTANT
Payer: COMMERCIAL

## 2024-01-06 ENCOUNTER — APPOINTMENT (OUTPATIENT)
Dept: GENERAL RADIOLOGY | Facility: CLINIC | Age: 79
End: 2024-01-06
Attending: PHYSICIAN ASSISTANT
Payer: COMMERCIAL

## 2024-01-06 VITALS
HEART RATE: 115 BPM | DIASTOLIC BLOOD PRESSURE: 80 MMHG | RESPIRATION RATE: 22 BRPM | OXYGEN SATURATION: 96 % | TEMPERATURE: 99.3 F | SYSTOLIC BLOOD PRESSURE: 136 MMHG

## 2024-01-06 DIAGNOSIS — J20.5 ACUTE BRONCHITIS DUE TO RESPIRATORY SYNCYTIAL VIRUS (RSV): ICD-10-CM

## 2024-01-06 PROCEDURE — 99213 OFFICE O/P EST LOW 20 MIN: CPT | Performed by: PHYSICIAN ASSISTANT

## 2024-01-06 PROCEDURE — 71046 X-RAY EXAM CHEST 2 VIEWS: CPT

## 2024-01-06 PROCEDURE — G0463 HOSPITAL OUTPT CLINIC VISIT: HCPCS | Mod: 25 | Performed by: PHYSICIAN ASSISTANT

## 2024-01-06 RX ORDER — BENZONATATE 100 MG/1
100-200 CAPSULE ORAL 3 TIMES DAILY PRN
Qty: 42 CAPSULE | Refills: 0 | Status: SHIPPED | OUTPATIENT
Start: 2024-01-06 | End: 2024-02-05

## 2024-01-06 ASSESSMENT — ACTIVITIES OF DAILY LIVING (ADL): ADLS_ACUITY_SCORE: 35

## 2024-01-06 NOTE — ED PROVIDER NOTES
History     Chief Complaint   Patient presents with    Cough     Patient dx RSV 12/28/2023.Patient states he isn't improving , feels very congested in his chest.   Patient was prescribed albuterol inhaler at his last visit on 12/28/2023, he states the inhaler provides some relief but not much.      JUDY Ruggiero is a 78 year old male who presents to urgent care with concern over persistent cough after being diagnosed with RSV infection 12/28/2023.  He continues to complain of nasal congestion, postnasal drainage, chest congestion, cough.  He is unaware of any significant wheezing, shortness of breath, fever, chills, myalgias, nausea, vomiting, diarrhea or abdominal pain.  He denies any prior history of asthma, COPD or other breathing related disorders.  He is a former smoker who quit almost 20 years ago.      Allergies:  Allergies   Allergen Reactions    Atenolol Shortness Of Breath     Other reaction(s): Unknown  Other reaction(s): Hard to breath    Atenolol Difficulty breathing     Rapid heart beat    Atorvastatin Muscle Pain (Myalgia)     Other reaction(s): Myalgias    Buspirone Difficulty breathing and Other (See Comments)     Other reaction(s): Unknown  Rapid heart beat    Coreg [Carvedilol] Dizziness    Gabapentin Other (See Comments)     Other reaction(s): Dizzy  Dizzy      Lyrica [Pregabalin] Other (See Comments)     Other reaction(s): dizziness  Dizzy      Benadryl [Diphenhydramine] Anxiety     Other reaction(s): excitablity    Diazepam Anxiety     Nausea and vomiting    Vistaril [Hydroxyzine] Anxiety     Other reaction(s): dizzy,HA, nausea         Problem List:    Patient Active Problem List    Diagnosis Date Noted    Chronic right-sided low back pain 12/01/2023     Priority: Medium    Nonobstructive atherosclerosis of coronary artery 01/27/2023     Priority: Medium    Premature atrial contractions 01/27/2023     Priority: Medium    Paroxysmal supraventricular tachycardia 01/27/2023     Priority:  Medium    Status post coronary angiogram 01/11/2021     Priority: Medium    Abnormal stress test 12/29/2020     Priority: Medium     Added automatically from request for surgery [id number]      NSVT (nonsustained ventricular tachycardia) (H) 12/29/2020     Priority: Medium     Added automatically from request for surgery [id number]      Ventricular tachycardia (H) 11/11/2020     Priority: Medium    History of colectomy 08/21/2020     Priority: Medium    Esophageal reflux 10/17/2018     Priority: Medium    Benign essential hypertension 08/16/2018     Priority: Medium    Hereditary and idiopathic peripheral neuropathy 08/16/2018     Priority: Medium    Iron deficiency anemia due to chronic blood loss 08/16/2018     Priority: Medium    Major depressive disorder with single episode, in full remission (H24) 08/16/2018     Priority: Medium     Started about 2017      Palpitations 08/16/2018     Priority: Medium    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     Priority: Medium    Disorder of right rotator cuff 08/16/2018     Priority: Medium    Hyperlipidemia LDL goal <70 10/02/2017     Priority: Medium    Benign prostatic hyperplasia with urinary obstruction 08/30/2013     Priority: Medium    Vitamin D deficiency 01/29/2013     Priority: Medium    Osteoarthritis 02/07/2009     Priority: Medium    DDD (degenerative disc disease), lumbar 02/07/2009     Priority: Medium    Lumbar facet arthropathy 02/07/2009     Priority: Medium    Ulcerative colitis (H) 02/07/2009     Priority: Medium    Anxiety state 12/06/2007     Priority: Medium    Knee joint replacement by other means 12/06/2007     Priority: Medium     Overview:   with Dr. Garcia 12-6-2007      Pure hypercholesterolemia 12/06/2007     Priority: Medium        Past Medical History:    Past Medical History:   Diagnosis Date    Generalized osteoarthrosis, unspecified site     Other anxiety states     Prostatitis, unspecified     Ulcerative colitis, unspecified     Unspecified  essential hypertension        Past Surgical History:    Past Surgical History:   Procedure Laterality Date    ARTHROSCOPY SHOULDER, OPEN ROTATOR CUFF REPAIR, COMBINED Right 3/4/2016    Procedure: COMBINED ARTHROSCOPY SHOULDER, OPEN ROTATOR CUFF REPAIR;  Surgeon: Wily Kan MD;  Location: [facility] OR    CV LEFT HEART CATH N/A 2021    Procedure: Left Heart Cath;  Surgeon: Mike Camara MD;  Location:  HEART CARDIAC CATH LAB    FLEXIBLE SIGMOIDOSCOPY  1999    PHACOEMULSIFICATION CLEAR CORNEA WITH TORIC INTRAOCULAR LENS IMPLANT Right 2018    Procedure: PHACOEMULSIFICATION CLEAR CORNEA WITH TORIC INTRAOCULAR LENS IMPLANT;  Right Cataract Removal with Implant--Toric Lens;  Surgeon: Mike Collier MD;  Location: [facility] OR    PHACOEMULSIFICATION CLEAR CORNEA WITH TORIC INTRAOCULAR LENS IMPLANT Left 3/12/2018    Procedure: PHACOEMULSIFICATION CLEAR CORNEA WITH TORIC INTRAOCULAR LENS IMPLANT;  Left Cataract Removal with Implant--Toric lens;  Surgeon: Mike Collier MD;  Location: [facility] OR    SURGICAL HISTORY OF -       Colostomy  with ileostomy    SURGICAL HISTORY OF -       Colostomy Reversal    SURGICAL HISTORY OF -       Left Shoulder Rotator Cuff Surgery       Family History:    Family History   Problem Relation Age of Onset    Skin Cancer Father     Breast Cancer Sister     Melanoma No family hx of        Social History:  Marital Status:   [2]  Social History     Tobacco Use    Smoking status: Former     Packs/day: 0.25     Years: 20.00     Additional pack years: 0.00     Total pack years: 5.00     Types: Cigarettes     Quit date: 1995     Years since quittin.9    Smokeless tobacco: Never   Vaping Use    Vaping Use: Never used   Substance Use Topics    Alcohol use: No     Comment: stopped a couple of months    Drug use: No        Medications:    amLODIPine (NORVASC) 10 MG tablet  aspirin (ASA) 81 MG EC tablet  benzonatate (TESSALON) 200 MG capsule  Biotin 94798  MCG TABS  Cholecalciferol (VITAMIN D3 PO)  Coenzyme Q10 (COQ-10 PO)  ezetimibe (ZETIA) 10 MG tablet  ferrous sulfate 325 (65 FE) MG tablet  LORazepam (ATIVAN) 1 MG tablet  Naproxen Sodium (ALEVE PO)  omeprazole (PRILOSEC) 20 MG DR capsule  pravastatin (PRAVACHOL) 80 MG tablet  psyllium (METAMUCIL) 58.6 % packet  sertraline (ZOLOFT) 100 MG tablet  Zinc 30 MG TABS      Review of Systems  CONSTITUTIONAL:NEGATIVE for fever, chills, change in weight  INTEGUMENTARY/SKIN: NEGATIVE for worrisome rashes, moles or lesions  EYES: NEGATIVE for vision changes or irritation  ENT/MOUTH: POSITIVE for nasal congestion, post-nasal drainage and NEGATIVE for ear pain   RESP:POSITIVE for cough and NEGATIVE for SOB/dyspnea and wheezing  GI: NEGATIVE for abdominal pain, diarrhea, nausea, and vomiting  Physical Exam   BP: 136/80  Pulse: 115  Temp: 99.3  F (37.4  C)  Resp: 22  SpO2: 96 %  Physical Exam  GENERAL APPEARANCE: healthy, alert and no distress  EYES: EOMI,  PERRL, conjunctiva clear  HENT: ear canals and TM's normal.  Nose and mouth without ulcers, erythema or lesions  NECK: supple, nontender, no lymphadenopathy  RESP: intermittent coarse breath sounds - no rales, or wheezes  CV: regular rates and rhythm, normal S1 S2, no murmur noted  SKIN: no suspicious lesions or rashes  ED Course             Procedures       Critical Care time:  none        Results for orders placed or performed during the hospital encounter of 01/06/24   Chest XR,  PA & LAT     Status: None    Narrative    EXAM: XR CHEST 2 VIEWS  LOCATION: [facility]  DATE: 1/6/2024    INDICATION: Cough, RSV positive december 28, assess for pneumonia.  COMPARISON: None.      Impression    IMPRESSION: Lungs clear.  Pulmonary vascularity normal.  No effusion. Degenerative change thoracic spine.    CONCLUSION: Negative chest.       Medications - No data to display    Assessments & Plan (with Medical Decision Making)     I have reviewed the nursing  notes.  I have reviewed the findings, diagnosis, plan and need for follow up with the patient.       Discharge Medication List as of 1/6/2024 11:00 AM          Final diagnoses:   Acute bronchitis due to respiratory syncytial virus (RSV)     78-year-old male presents to urgent care with concern over persistent cough after being diagnosed with RSV 12/28/2023.  He had elevated heart rate upon arrival, remainder vital signs stable.  Physical exam findings significant for intermittently coarse breath sounds which did clear somewhat with coughing.  He had chest x-ray which was negative for evidence of acute infiltrate, pneumothorax, pleural effusion or change in cardiopulmonary vasculature.  Symptoms are consistent with RSV bronchitis.  He was discharged home with refill of Tessalon Perles.  Follow-up if no improvement within the next week.  Worrisome reasons to return to the ER/UC sooner discussed.    Disclaimer: This note consists of symbols derived from keyboarding, dictation, and/or voice recognition software. As a result, there may be errors in the script that have gone undetected.  Please consider this when interpreting information found in the chart.      1/6/2024   [facility] EMERGENCY DEPT       Janis Barrera PA-C  01/08/24 1047

## 2024-02-07 DIAGNOSIS — K21.00 GASTROESOPHAGEAL REFLUX DISEASE WITH ESOPHAGITIS WITHOUT HEMORRHAGE: ICD-10-CM

## 2024-02-22 NOTE — PROGRESS NOTES
CARDIOLOGY VISIT    REASON FOR VISIT: Nonocclusive CAD, PACs    SUBJECTIVE:  78-year-old male seen for nonocclusive CAD, PACs.  He has hypertension and ulcerative colitis.     He recently underwent evaluation for dizziness.  ZIO monitor August 2020 through [facility] showed sinus rhythm, 12 beat run of VT, a few SVT runs up to 13 seconds.  Carvedilol was started with plans to repeat a monitor.     Echo December 2020 showed EF 60%, normal wall motion, normal RV, no valve disease.  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     Zio monitor December 2020 showed sinus rhythm, 15% PACs with runs up to 16 seconds.     Coronary angiogram January 2021 showed 70% OM 2 lesion treated medically, minimal disease elsewhere.     Holter monitor June 2021 showed sinus rhythm, 14% PACs with runs up to 11 beats.    He has been feeling the same recently.  He will exercise 1 hour in the morning doing 30 minutes of cardio and then some strength work.  He will walk at 2.0 mph on the treadmill.  He denies any chest pain or shortness of breath.  He denies any palpitations.    MEDICATIONS:  Current Outpatient Medications   Medication    amLODIPine (NORVASC) 10 MG tablet    aspirin (ASA) 81 MG EC tablet    benzonatate (TESSALON) 200 MG capsule    Biotin 71187 MCG TABS    Cholecalciferol (VITAMIN D3 PO)    Coenzyme Q10 (COQ-10 PO)    ezetimibe (ZETIA) 10 MG tablet    ferrous sulfate 325 (65 FE) MG tablet    LORazepam (ATIVAN) 1 MG tablet    Naproxen Sodium (ALEVE PO)    omeprazole (PRILOSEC) 20 MG DR capsule    pravastatin (PRAVACHOL) 80 MG tablet    psyllium (METAMUCIL) 58.6 % packet    sertraline (ZOLOFT) 100 MG tablet    Zinc 30 MG TABS     No current facility-administered medications for this visit.       ALLERGIES:  Allergies   Allergen Reactions    Atenolol Shortness Of Breath     Other reaction(s): Unknown  Other reaction(s): Hard to  "breath    Atenolol Difficulty breathing     Rapid heart beat    Atorvastatin Muscle Pain (Myalgia)     Other reaction(s): Myalgias    Buspirone Difficulty breathing and Other (See Comments)     Other reaction(s): Unknown  Rapid heart beat    Coreg [Carvedilol] Dizziness    Gabapentin Other (See Comments)     Other reaction(s): Dizzy  Dizzy      Lyrica [Pregabalin] Other (See Comments)     Other reaction(s): dizziness  Dizzy      Benadryl [Diphenhydramine] Anxiety     Other reaction(s): excitablity    Diazepam Anxiety     Nausea and vomiting    Vistaril [Hydroxyzine] Anxiety     Other reaction(s): dizzy,HA, nausea         REVIEW OF SYSTEMS:  Constitutional:  No weight loss, fever, chills  HEENT:  Eyes:  No visual loss, blurred vision, double vision or yellow sclerae. No hearing loss, sneezing, congestion, runny nose or sore throat.  Skin:  No rash or itching.  Cardiovascular: per HPI  Respiratory: per HPI  GI:  No anorexia, nausea, vomiting or diarrhea. No abdominal pain or blood.  :  No dysurea, hematuria  Neurologic:  No headache, paralysis, ataxia, numbness or tingling in the extremities. No change in bowel or bladder control.  Musculoskeletal:  No muscle pain  Hematologic:  No bleeding or bruising.  Lymphatics:  No enlarged nodes. No history of splenectomy.  Endocrine:  No reports of sweating, cold or heat intolerance. No polyuria or polydipsia.  Allergies:  No history of asthma, hives, eczema or rhinitis.    PHYSICAL EXAM:  /66   Pulse 60   Resp 20   Ht 1.759 m (5' 9.25\")   Wt 78.7 kg (173 lb 9.6 oz)   SpO2 96%   BMI 25.45 kg/m    Constitutional: awake, alert, no distress  Eyes: PERRL, sclera nonicteric  ENT: trachea midline  Respiratory: Lungs clear  Cardiovascular: Regular rate and rhythm, occasional ectopy, no murmurs  GI: nondistended, nontender, bowel sounds present  Lymph/Hematologic: no lymphadenopathy  Skin: dry, no rash  Musculoskeletal: good muscle tone, strength 5/5 in upper and lower " extremities  Neurologic: no focal deficits  Neuropsychiatric: appropriate affact    DATA:  Lab: February 2024: Cholesterol 158, HDL 69, LDL 70    ASSESSMENT:  78-year-old male seen for nonocclusive CAD and PACs.  He is doing well with no concerning symptoms.  He has a fairly good exercise capacity for his age.  He has no symptoms from his PACs.  Lipids are at goal.    RECOMMENDATIONS:  1.  Nonocclusive CAD  -Continue current medications    2.  Asymptomatic PACs  -No further treatment    Follow-up in 1 year with lipids.    Timi Napier MD  Cardiology - [facility]  Pager:  [phone]  Text Page  February 27, 2024

## 2024-02-27 ENCOUNTER — LAB (OUTPATIENT)
Dept: LAB | Facility: CLINIC | Age: 79
End: 2024-02-27
Payer: COMMERCIAL

## 2024-02-27 ENCOUNTER — OFFICE VISIT (OUTPATIENT)
Dept: CARDIOLOGY | Facility: CLINIC | Age: 79
End: 2024-02-27
Attending: NURSE PRACTITIONER
Payer: COMMERCIAL

## 2024-02-27 VITALS
SYSTOLIC BLOOD PRESSURE: 138 MMHG | DIASTOLIC BLOOD PRESSURE: 66 MMHG | RESPIRATION RATE: 20 BRPM | OXYGEN SATURATION: 96 % | BODY MASS INDEX: 25.71 KG/M2 | HEART RATE: 60 BPM | HEIGHT: 69 IN | WEIGHT: 173.6 LBS

## 2024-02-27 DIAGNOSIS — E78.2 MIXED HYPERLIPIDEMIA: ICD-10-CM

## 2024-02-27 DIAGNOSIS — I25.10 NONOBSTRUCTIVE ATHEROSCLEROSIS OF CORONARY ARTERY: ICD-10-CM

## 2024-02-27 DIAGNOSIS — I47.10 PAROXYSMAL SUPRAVENTRICULAR TACHYCARDIA (H): ICD-10-CM

## 2024-02-27 DIAGNOSIS — E78.5 HYPERLIPIDEMIA LDL GOAL <70: ICD-10-CM

## 2024-02-27 DIAGNOSIS — I49.1 PREMATURE ATRIAL CONTRACTIONS: ICD-10-CM

## 2024-02-27 DIAGNOSIS — I10 BENIGN ESSENTIAL HYPERTENSION: ICD-10-CM

## 2024-02-27 LAB
ALT SERPL W P-5'-P-CCNC: 15 U/L (ref 0–70)
CHOLEST SERPL-MCNC: 158 MG/DL
FASTING STATUS PATIENT QL REPORTED: YES
HDLC SERPL-MCNC: 69 MG/DL
HOLD SPECIMEN: NORMAL
LDLC SERPL CALC-MCNC: 70 MG/DL
NONHDLC SERPL-MCNC: 89 MG/DL
TRIGL SERPL-MCNC: 93 MG/DL

## 2024-02-27 PROCEDURE — 99214 OFFICE O/P EST MOD 30 MIN: CPT | Performed by: INTERNAL MEDICINE

## 2024-02-27 PROCEDURE — 36415 COLL VENOUS BLD VENIPUNCTURE: CPT | Performed by: NURSE PRACTITIONER

## 2024-02-27 PROCEDURE — 84460 ALANINE AMINO (ALT) (SGPT): CPT | Performed by: NURSE PRACTITIONER

## 2024-02-27 PROCEDURE — 80061 LIPID PANEL: CPT | Performed by: NURSE PRACTITIONER

## 2024-02-27 NOTE — LETTER
2/27/2024    Vazquez Marquez, DO  [address] [facility] [address]    RE: Dusty Ruggiero       Dear Colleague,     I had the pleasure of seeing Dusty Ruggiero in the [facility].  CARDIOLOGY VISIT    REASON FOR VISIT: Nonocclusive CAD, PACs    SUBJECTIVE:  78-year-old male seen for nonocclusive CAD, PACs.  He has hypertension and ulcerative colitis.     He recently underwent evaluation for dizziness.  ZIO monitor August 2020 through [facility] showed sinus rhythm, 12 beat run of VT, a few SVT runs up to 13 seconds.  Carvedilol was started with plans to repeat a monitor.     Echo December 2020 showed EF 60%, normal wall motion, normal RV, no valve disease.  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     Zio monitor December 2020 showed sinus rhythm, 15% PACs with runs up to 16 seconds.     Coronary angiogram January 2021 showed 70% OM 2 lesion treated medically, minimal disease elsewhere.     Holter monitor June 2021 showed sinus rhythm, 14% PACs with runs up to 11 beats.    He has been feeling the same recently.  He will exercise 1 hour in the morning doing 30 minutes of cardio and then some strength work.  He will walk at 2.0 mph on the treadmill.  He denies any chest pain or shortness of breath.  He denies any palpitations.    MEDICATIONS:  Current Outpatient Medications   Medication    amLODIPine (NORVASC) 10 MG tablet    aspirin (ASA) 81 MG EC tablet    benzonatate (TESSALON) 200 MG capsule    Biotin 37197 MCG TABS    Cholecalciferol (VITAMIN D3 PO)    Coenzyme Q10 (COQ-10 PO)    ezetimibe (ZETIA) 10 MG tablet    ferrous sulfate 325 (65 FE) MG tablet    LORazepam (ATIVAN) 1 MG tablet    Naproxen Sodium (ALEVE PO)    omeprazole (PRILOSEC) 20 MG DR capsule    pravastatin (PRAVACHOL) 80 MG tablet    psyllium (METAMUCIL) 58.6 % packet    sertraline (ZOLOFT) 100 MG tablet    Zinc 30 MG TABS  "    No current facility-administered medications for this visit.       ALLERGIES:  Allergies   Allergen Reactions    Atenolol Shortness Of Breath     Other reaction(s): Unknown  Other reaction(s): Hard to breath    Atenolol Difficulty breathing     Rapid heart beat    Atorvastatin Muscle Pain (Myalgia)     Other reaction(s): Myalgias    Buspirone Difficulty breathing and Other (See Comments)     Other reaction(s): Unknown  Rapid heart beat    Coreg [Carvedilol] Dizziness    Gabapentin Other (See Comments)     Other reaction(s): Dizzy  Dizzy      Lyrica [Pregabalin] Other (See Comments)     Other reaction(s): dizziness  Dizzy      Benadryl [Diphenhydramine] Anxiety     Other reaction(s): excitablity    Diazepam Anxiety     Nausea and vomiting    Vistaril [Hydroxyzine] Anxiety     Other reaction(s): dizzy,HA, nausea         REVIEW OF SYSTEMS:  Constitutional:  No weight loss, fever, chills  HEENT:  Eyes:  No visual loss, blurred vision, double vision or yellow sclerae. No hearing loss, sneezing, congestion, runny nose or sore throat.  Skin:  No rash or itching.  Cardiovascular: per HPI  Respiratory: per HPI  GI:  No anorexia, nausea, vomiting or diarrhea. No abdominal pain or blood.  :  No dysurea, hematuria  Neurologic:  No headache, paralysis, ataxia, numbness or tingling in the extremities. No change in bowel or bladder control.  Musculoskeletal:  No muscle pain  Hematologic:  No bleeding or bruising.  Lymphatics:  No enlarged nodes. No history of splenectomy.  Endocrine:  No reports of sweating, cold or heat intolerance. No polyuria or polydipsia.  Allergies:  No history of asthma, hives, eczema or rhinitis.    PHYSICAL EXAM:  /66   Pulse 60   Resp 20   Ht 1.759 m (5' 9.25\")   Wt 78.7 kg (173 lb 9.6 oz)   SpO2 96%   BMI 25.45 kg/m    Constitutional: awake, alert, no distress  Eyes: PERRL, sclera nonicteric  ENT: trachea midline  Respiratory: Lungs clear  Cardiovascular: Regular rate and rhythm, " occasional ectopy, no murmurs  GI: nondistended, nontender, bowel sounds present  Lymph/Hematologic: no lymphadenopathy  Skin: dry, no rash  Musculoskeletal: good muscle tone, strength 5/5 in upper and lower extremities  Neurologic: no focal deficits  Neuropsychiatric: appropriate affact    DATA:  Lab: February 2024: Cholesterol 158, HDL 69, LDL 70    ASSESSMENT:  78-year-old male seen for nonocclusive CAD and PACs.  He is doing well with no concerning symptoms.  He has a fairly good exercise capacity for his age.  He has no symptoms from his PACs.  Lipids are at goal.    RECOMMENDATIONS:  1.  Nonocclusive CAD  -Continue current medications    2.  Asymptomatic PACs  -No further treatment    Follow-up in 1 year with lipids.    Timi Napier MD  Cardiology - [facility]  Pager:  [phone]  Text Page  February 27, 2024        Thank you for allowing me to participate in the care of your patient.      Sincerely,     Timi Napier MD     [facility]  cc:   Venecia Mckeon, AURORA CNP  [address]  [place]  [place] [address]

## 2024-02-27 NOTE — PATIENT INSTRUCTIONS
Recent Labs   Lab Test 02/27/24  0847 05/30/23  0906   CHOL 158 160   HDL 69 73   LDL 70 68   TRIG 93 97       Your cholesterol numbers look good.  Your LDL, or bad cholesterol, is 70, this is the goal.  Please continue your current medications.

## 2024-02-28 ENCOUNTER — OFFICE VISIT (OUTPATIENT)
Dept: FAMILY MEDICINE | Facility: CLINIC | Age: 79
End: 2024-02-28
Payer: COMMERCIAL

## 2024-02-28 VITALS
OXYGEN SATURATION: 96 % | HEART RATE: 64 BPM | RESPIRATION RATE: 20 BRPM | SYSTOLIC BLOOD PRESSURE: 128 MMHG | BODY MASS INDEX: 25.77 KG/M2 | HEIGHT: 69 IN | WEIGHT: 174 LBS | DIASTOLIC BLOOD PRESSURE: 70 MMHG | TEMPERATURE: 97.8 F

## 2024-02-28 DIAGNOSIS — F32.5 MAJOR DEPRESSIVE DISORDER WITH SINGLE EPISODE, IN FULL REMISSION (H): ICD-10-CM

## 2024-02-28 DIAGNOSIS — I47.20 VENTRICULAR TACHYCARDIA (H): ICD-10-CM

## 2024-02-28 DIAGNOSIS — I47.29 NSVT (NONSUSTAINED VENTRICULAR TACHYCARDIA) (H): ICD-10-CM

## 2024-02-28 DIAGNOSIS — K51.90 ULCERATIVE COLITIS WITHOUT COMPLICATIONS, UNSPECIFIED LOCATION (H): ICD-10-CM

## 2024-02-28 DIAGNOSIS — F41.1 ANXIETY STATE: Primary | ICD-10-CM

## 2024-02-28 PROCEDURE — 99213 OFFICE O/P EST LOW 20 MIN: CPT | Performed by: FAMILY MEDICINE

## 2024-02-28 RX ORDER — LORAZEPAM 1 MG/1
0.5 TABLET ORAL 3 TIMES DAILY PRN
Qty: 45 TABLET | Refills: 5 | Status: SHIPPED | OUTPATIENT
Start: 2024-02-28 | End: 2024-08-28

## 2024-02-28 ASSESSMENT — ANXIETY QUESTIONNAIRES
4. TROUBLE RELAXING: NOT AT ALL
IF YOU CHECKED OFF ANY PROBLEMS ON THIS QUESTIONNAIRE, HOW DIFFICULT HAVE THESE PROBLEMS MADE IT FOR YOU TO DO YOUR WORK, TAKE CARE OF THINGS AT HOME, OR GET ALONG WITH OTHER PEOPLE: NOT DIFFICULT AT ALL
2. NOT BEING ABLE TO STOP OR CONTROL WORRYING: NOT AT ALL
7. FEELING AFRAID AS IF SOMETHING AWFUL MIGHT HAPPEN: NOT AT ALL
8. IF YOU CHECKED OFF ANY PROBLEMS, HOW DIFFICULT HAVE THESE MADE IT FOR YOU TO DO YOUR WORK, TAKE CARE OF THINGS AT HOME, OR GET ALONG WITH OTHER PEOPLE?: NOT DIFFICULT AT ALL
GAD7 TOTAL SCORE: 1
GAD7 TOTAL SCORE: 1
6. BECOMING EASILY ANNOYED OR IRRITABLE: NOT AT ALL
3. WORRYING TOO MUCH ABOUT DIFFERENT THINGS: NOT AT ALL
5. BEING SO RESTLESS THAT IT IS HARD TO SIT STILL: NOT AT ALL
7. FEELING AFRAID AS IF SOMETHING AWFUL MIGHT HAPPEN: NOT AT ALL
1. FEELING NERVOUS, ANXIOUS, OR ON EDGE: SEVERAL DAYS
GAD7 TOTAL SCORE: 1

## 2024-02-28 ASSESSMENT — PAIN SCALES - GENERAL: PAINLEVEL: NO PAIN (0)

## 2024-02-28 ASSESSMENT — PATIENT HEALTH QUESTIONNAIRE - PHQ9
SUM OF ALL RESPONSES TO PHQ QUESTIONS 1-9: 0
SUM OF ALL RESPONSES TO PHQ QUESTIONS 1-9: 0

## 2024-02-28 NOTE — PROGRESS NOTES
Assessment & Plan     Anxiety state  Major depressive disorder with single episode, in full remission (H24)  Currently on sertraline 50 mg twice daily and utilizes Ativan 0.5 mg 3 times daily.  He has been on this regimen for a long time which helps with his depression and anxiety.  He understands the risk of the lorazepam that is a controlled substance.  He wishes to continue with this.  - LORazepam (ATIVAN) 1 MG tablet  Dispense: 45 tablet; Refill: 5      NSVT (nonsustained ventricular tachycardia) (H)  Ventricular tachycardia (H)  Known issue that I take into account for their medical decisions; no current exacerbations or new concerns.   Follows with cardiology.    Ulcerative colitis without complications, unspecified location (H)  Known issue that I take into account for their medical decisions; no current exacerbations or new concerns.   Colon resection 1999, no current medications.       The risks, benefits and treatment options of prescribed medications or other treatments have been discussed with the patient. The patient verbalized their understanding and should call or follow up if no improvement or if they develop further problems.      Follow up in 6 months or sooner if needed.     Dion Montano is a 78 year old, presenting for the following health issues:  Depression and Hypertension        2/28/2024     9:15 AM   Additional Questions   Roomed by Clarice REINA     History of Present Illness       Mental Health Follow-up:  Patient presents to follow-up on Depression.Patient's depression since last visit has been:  Good  The patient is not having other symptoms associated with depression.      Any significant life events: No  Patient is not feeling anxious or having panic attacks.  Patient has no concerns about alcohol or drug use.    Hypertension: He presents for follow up of hypertension.  He does not check blood pressure  regularly outside of the clinic. Outpatient blood pressures have not been over  "140/90. He follows a low salt diet.     He eats 2-3 servings of fruits and vegetables daily.He consumes 2 sweetened beverage(s) daily.He exercises with enough effort to increase his heart rate 60 or more minutes per day.  He exercises with enough effort to increase his heart rate 7 days per week.   He is taking medications regularly.           78-year-old male who presents to clinic for follow-up on hypertension and depression       Depression   Sertraline 50 mg BID   Lorazepam as needed.   Reports has been doing well.   No issues.   Understands ativan is a controlled substance and increases his risk for falls and confusion.   Wishes to continue on his current regimen.   No current therapist.     HTN  Amlodipine 10 mg daily   Not checking at home.   Overall doing well.     Ulcerative colitis  Diagnosed in 1999   Underwent colon resection in the past.   Overall doing well.  Is on no medications currently.        Objective    /70 (BP Location: Right arm, Patient Position: Chair, Cuff Size: Adult Regular)   Pulse 64   Temp 97.8  F (36.6  C) (Oral)   Resp 20   Ht 1.759 m (5' 9.25\")   Wt 78.9 kg (174 lb)   SpO2 96%   BMI 25.51 kg/m    Body mass index is 25.51 kg/m .  Physical Exam   General: alert, cooperative, no acute distress   CV: RRR, no murmur  Resp: non-labored breathing, clear to auscultation, no wheezing or rales   Abdomen: Soft, non-tender, no guarding.   Extremities: No peripheral edema, calves non-tender.   Appearance: Patient appears well groomed and appropriately dressed.   Orientation: Patient alert and oriented to person, place, and time.  Behavior: Appropriate to setting and is cooperative with mental status examination. No overactivity or catatonia.  Speech: Organized, clear, and concise, with appropriate volume and tone.  Thought: Able to think abstractly. No evidence of tangentiality or circumstantiality.  Perception: No auditory or visual hallucinations. No delusions.  Mood: No evidence " of euphoria or dysphoria. No lability.  Affect: Congruent to mood. Not flattened or narrowed.  Insight/Judgement: Insight and judgment intact.      Signed Electronically by: Vazquez Marquez DO

## 2024-03-19 ENCOUNTER — HOSPITAL ENCOUNTER (EMERGENCY)
Facility: CLINIC | Age: 79
Discharge: HOME | End: 2024-03-19
Attending: PHYSICIAN ASSISTANT | Admitting: PHYSICIAN ASSISTANT
Payer: COMMERCIAL

## 2024-03-19 VITALS
OXYGEN SATURATION: 98 % | RESPIRATION RATE: 16 BRPM | SYSTOLIC BLOOD PRESSURE: 149 MMHG | DIASTOLIC BLOOD PRESSURE: 72 MMHG | TEMPERATURE: 98.3 F | HEART RATE: 36 BPM

## 2024-03-19 DIAGNOSIS — R11.0 NAUSEA: ICD-10-CM

## 2024-03-19 DIAGNOSIS — R00.1 ASYMPTOMATIC BRADYCARDIA: ICD-10-CM

## 2024-03-19 DIAGNOSIS — R19.7 DIARRHEA: ICD-10-CM

## 2024-03-19 LAB
FLUAV RNA SPEC QL NAA+PROBE: NEGATIVE
FLUBV RNA RESP QL NAA+PROBE: NEGATIVE
RSV RNA SPEC NAA+PROBE: NEGATIVE
SARS-COV-2 RNA RESP QL NAA+PROBE: NEGATIVE

## 2024-03-19 PROCEDURE — 93005 ELECTROCARDIOGRAM TRACING: CPT | Performed by: EMERGENCY MEDICINE

## 2024-03-19 PROCEDURE — 93010 ELECTROCARDIOGRAM REPORT: CPT | Performed by: EMERGENCY MEDICINE

## 2024-03-19 PROCEDURE — G0463 HOSPITAL OUTPT CLINIC VISIT: HCPCS | Performed by: PHYSICIAN ASSISTANT

## 2024-03-19 PROCEDURE — 87637 SARSCOV2&INF A&B&RSV AMP PRB: CPT | Performed by: PHYSICIAN ASSISTANT

## 2024-03-19 PROCEDURE — 99213 OFFICE O/P EST LOW 20 MIN: CPT | Performed by: PHYSICIAN ASSISTANT

## 2024-03-19 RX ORDER — ONDANSETRON 4 MG/1
4 TABLET, ORALLY DISINTEGRATING ORAL EVERY 8 HOURS PRN
Qty: 10 TABLET | Refills: 0 | Status: SHIPPED | OUTPATIENT
Start: 2024-03-19

## 2024-03-19 ASSESSMENT — COLUMBIA-SUICIDE SEVERITY RATING SCALE - C-SSRS
6. HAVE YOU EVER DONE ANYTHING, STARTED TO DO ANYTHING, OR PREPARED TO DO ANYTHING TO END YOUR LIFE?: NO
2. HAVE YOU ACTUALLY HAD ANY THOUGHTS OF KILLING YOURSELF IN THE PAST MONTH?: NO
1. IN THE PAST MONTH, HAVE YOU WISHED YOU WERE DEAD OR WISHED YOU COULD GO TO SLEEP AND NOT WAKE UP?: NO

## 2024-03-19 ASSESSMENT — ACTIVITIES OF DAILY LIVING (ADL): ADLS_ACUITY_SCORE: 35

## 2024-03-19 NOTE — ED PROVIDER NOTES
History   No chief complaint on file.    HPI  Dusty Ruggiero is a 78 year old male past medical history significant for ulcerative colitis S/P colectomy, HTN, BPH, GERD, hyperlipidemia, ventricular tachycardia,  who presents to urgent care with concern over diarrhea which has been present for the last week.  Patient states he has baseline loose stools up to 8 times daily.  Over the last week this has increased in frequency.  He had had associated nausea but significant other states possible tactile fever at onset.  No significant chills, myalgias, vomiting, cough, chest pain, dyspnea, wheezing, abdominal pain.  No melena or hematochezia however did note change in color of stool after attempting to treat with Pepto-Bismol.  He did have minimal temporary improvement with Pepto-Bismol.  He also attempted to treat with Imodium with similar minimal short term results.  His significant other reports she had illness that developed around the same time as patient    Allergies:  Allergies   Allergen Reactions    Atenolol Shortness Of Breath     Other reaction(s): Unknown  Other reaction(s): Hard to breath    Atenolol Difficulty breathing     Rapid heart beat    Atorvastatin Muscle Pain (Myalgia)     Other reaction(s): Myalgias    Buspirone Difficulty breathing and Other (See Comments)     Other reaction(s): Unknown  Rapid heart beat    Coreg [Carvedilol] Dizziness    Gabapentin Other (See Comments)     Other reaction(s): Dizzy  Dizzy      Lyrica [Pregabalin] Other (See Comments)     Other reaction(s): dizziness  Dizzy      Benadryl [Diphenhydramine] Anxiety     Other reaction(s): excitablity    Diazepam Anxiety     Nausea and vomiting    Vistaril [Hydroxyzine] Anxiety     Other reaction(s): dizzy,HA, nausea         Problem List:    Patient Active Problem List    Diagnosis Date Noted    Chronic right-sided low back pain 12/01/2023     Priority: Medium    Nonobstructive atherosclerosis of coronary artery 01/27/2023      Priority: Medium    Premature atrial contractions 01/27/2023     Priority: Medium    Paroxysmal supraventricular tachycardia 01/27/2023     Priority: Medium    Status post coronary angiogram 01/11/2021     Priority: Medium    Abnormal stress test 12/29/2020     Priority: Medium     Added automatically from request for surgery [id number]      NSVT (nonsustained ventricular tachycardia) (H) 12/29/2020     Priority: Medium     Added automatically from request for surgery [id number]      Ventricular tachycardia (H) 11/11/2020     Priority: Medium    History of colectomy 08/21/2020     Priority: Medium    Esophageal reflux 10/17/2018     Priority: Medium    Benign essential hypertension 08/16/2018     Priority: Medium    Hereditary and idiopathic peripheral neuropathy 08/16/2018     Priority: Medium    Iron deficiency anemia due to chronic blood loss 08/16/2018     Priority: Medium    Major depressive disorder with single episode, in full remission (H24) 08/16/2018     Priority: Medium     Started about 2017      Palpitations 08/16/2018     Priority: Medium    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     Priority: Medium    Disorder of right rotator cuff 08/16/2018     Priority: Medium    Hyperlipidemia LDL goal <70 10/02/2017     Priority: Medium    Benign prostatic hyperplasia with urinary obstruction 08/30/2013     Priority: Medium    Vitamin D deficiency 01/29/2013     Priority: Medium    Osteoarthritis 02/07/2009     Priority: Medium    DDD (degenerative disc disease), lumbar 02/07/2009     Priority: Medium    Lumbar facet arthropathy 02/07/2009     Priority: Medium    Ulcerative colitis (H) 02/07/2009     Priority: Medium    Anxiety state 12/06/2007     Priority: Medium    Knee joint replacement by other means 12/06/2007     Priority: Medium     Overview:   with Dr. Garcia 12-6-2007      Pure hypercholesterolemia 12/06/2007     Priority: Medium        Past Medical History:    Past Medical History:   Diagnosis Date     Generalized osteoarthrosis, unspecified site     Other anxiety states     Prostatitis, unspecified     Ulcerative colitis, unspecified     Unspecified essential hypertension        Past Surgical History:    Past Surgical History:   Procedure Laterality Date    ARTHROSCOPY SHOULDER, OPEN ROTATOR CUFF REPAIR, COMBINED Right 3/4/2016    Procedure: COMBINED ARTHROSCOPY SHOULDER, OPEN ROTATOR CUFF REPAIR;  Surgeon: Wily Kan MD;  Location: [facility] OR    CV LEFT HEART CATH N/A 2021    Procedure: Left Heart Cath;  Surgeon: Mike Camara MD;  Location:  HEART CARDIAC CATH LAB    FLEXIBLE SIGMOIDOSCOPY  1999    PHACOEMULSIFICATION CLEAR CORNEA WITH TORIC INTRAOCULAR LENS IMPLANT Right 2018    Procedure: PHACOEMULSIFICATION CLEAR CORNEA WITH TORIC INTRAOCULAR LENS IMPLANT;  Right Cataract Removal with Implant--Toric Lens;  Surgeon: Mike Collier MD;  Location: [facility] OR    PHACOEMULSIFICATION CLEAR CORNEA WITH TORIC INTRAOCULAR LENS IMPLANT Left 3/12/2018    Procedure: PHACOEMULSIFICATION CLEAR CORNEA WITH TORIC INTRAOCULAR LENS IMPLANT;  Left Cataract Removal with Implant--Toric lens;  Surgeon: Mike Collier MD;  Location: [facility] OR    SURGICAL HISTORY OF -       Colostomy  with ileostomy    SURGICAL HISTORY OF -       Colostomy Reversal    SURGICAL HISTORY OF -       Left Shoulder Rotator Cuff Surgery       Family History:    Family History   Problem Relation Age of Onset    Skin Cancer Father     Breast Cancer Sister     Melanoma No family hx of        Social History:  Marital Status:   [2]  Social History     Tobacco Use    Smoking status: Former     Packs/day: 0.25     Years: 20.00     Additional pack years: 0.00     Total pack years: 5.00     Types: Cigarettes     Quit date: 1995     Years since quittin.1    Smokeless tobacco: Never   Vaping Use    Vaping Use: Never used   Substance Use Topics    Alcohol use: No     Comment: stopped a couple of months     Drug use: No        Medications:    ondansetron (ZOFRAN ODT) 4 MG ODT tab  amLODIPine (NORVASC) 10 MG tablet  aspirin (ASA) 81 MG EC tablet  benzonatate (TESSALON) 200 MG capsule  Biotin 92996 MCG TABS  Cholecalciferol (VITAMIN D3 PO)  Coenzyme Q10 (COQ-10 PO)  ezetimibe (ZETIA) 10 MG tablet  ferrous sulfate 325 (65 FE) MG tablet  LORazepam (ATIVAN) 1 MG tablet  Naproxen Sodium (ALEVE PO)  omeprazole (PRILOSEC) 20 MG DR capsule  pravastatin (PRAVACHOL) 80 MG tablet  psyllium (METAMUCIL) 58.6 % packet  sertraline (ZOLOFT) 100 MG tablet  Zinc 30 MG TABS      Review of Systems  CONSTITUTIONAL:POSITIVE  for possible tactile fever  and NEGATIVE  for chills, myalgias   INTEGUMENTARY/SKIN: NEGATIVE for worrisome rashes, moles or lesions  EYES: NEGATIVE for vision changes or irritation  ENT/MOUTH: NEGATIVE for ear, mouth and throat problems  RESP:NEGATIVE for significant cough or SOB  GI: POSITIVE for nausea, diarrhea and NEGATIVE for vomiting, melena, hematochezia   Physical Exam   BP: (!) 149/72  Pulse: (!) 36  Temp: 98.3  F (36.8  C)  Resp: 16  SpO2: 98 %  Physical Exam  GENERAL APPEARANCE:alert, cooperative and no acute distress  EYES: EOMI,  PERRL, conjunctiva clear  HENT: oral mucosa moist   NECK: supple, nontender, no lymphadenopathy  RESP: lungs clear to auscultation - no rales, rhonchi or wheezes  CV: bradycardia, regular rhythm, normal S1 S2, no murmur noted  ABDOMEN:  soft, non-tender, no HSM or masses and bowel sounds normal  SKIN: no suspicious lesions or rashes  ED Course        Procedures                 EKG Interpretation:      Interpreted by Dr. Arturo Trinidad   Symptoms at time of EKG: None   Rhythm: Normal sinus   Rate: 67  Axis: Normal  Ectopy: Premature atrial contraction  Conduction: Right bundle branch block and Left anterior fasciclar block  ST Segments/ T Waves: No ST-T wave changes and No acute ischemic changes  Q Waves: None  Comparison to prior: No old EKG available    Clinical Impression:  non-specific EKG      Critical Care time:  none               No results found for this or any previous visit (from the past 24 hour(s)).    Medications - No data to display    Assessments & Plan (with Medical Decision Making)     I have reviewed the nursing notes.    I have reviewed the findings, diagnosis, plan and need for follow up with the patient.       Discharge Medication List as of 3/19/2024  1:36 PM        START taking these medications    Details   ondansetron (ZOFRAN ODT) 4 MG ODT tab Take 1 tablet (4 mg) by mouth every 8 hours as needed for nausea or vomiting, Disp-10 tablet, R-0, E-Prescribe             Final diagnoses:   Asymptomatic bradycardia   Diarrhea   Nausea     78-year-old male presents to urgent care with concern over nausea, diarrhea for the last week.  He was noted to be bradycardic upon arrival which prompted the EKG which showed normal sinus rhythm with PACs, right bundle branch block, left anterior fascicular block.  He had benign nonsurgical abdominal examination.  Oral mucosa was moist.  No clinical evidence of dehydration.  Symptoms most likely secondary to gastroenteritis, we would consider potential for colitis given his history.  Given duration of symptoms stool cultures were ordered.  Given asymptomatic bradycardia with quick rebound and normal level did recommend patient contact his cardiologist to discuss if further evaluation is necessary.  Follow-up if no improvement of diarrhea within the next 3 to 5 days.  Worrisome reasons to return to the ER/UC sooner discussed.    Disclaimer: This note consists of symbols derived from keyboarding, dictation, and/or voice recognition software. As a result, there may be errors in the script that have gone undetected.  Please consider this when interpreting information found in the chart.      3/19/2024   [facility] EMERGENCY DEPT       Janis Barrera PA-C  03/22/24 [id number]

## 2024-03-20 ENCOUNTER — LAB (OUTPATIENT)
Dept: LAB | Facility: CLINIC | Age: 79
End: 2024-03-20
Payer: COMMERCIAL

## 2024-03-20 DIAGNOSIS — R11.0 NAUSEA: ICD-10-CM

## 2024-03-20 LAB

## 2024-03-20 PROCEDURE — 87507 IADNA-DNA/RNA PROBE TQ 12-25: CPT | Mod: GZ

## 2024-03-21 ENCOUNTER — TELEPHONE (OUTPATIENT)
Dept: EMERGENCY MEDICINE | Facility: CLINIC | Age: 79
End: 2024-03-21
Payer: COMMERCIAL

## 2024-03-21 NOTE — LETTER
March 21, 2024        Dusty Ruggiero  [address]  [place] [address]          Dear Dusty Ruggiero:    You were seen in the [facility] Emergency Department at [facility] EMERGENCY DEPT on 3/19/2024.  We are unable to reach you by phone, so we are sending you this letter.     It is important that you call [facility] Emergency Department lab result nurse at [phone], as we have information to relay to you AND/OR we MAY have to make some changes in your treatment.    Best time to call back is between 9AM and 5:30PM, 7 days a week.      Sincerely,     [facility] Emergency Department Lab Result RN  [phone]

## 2024-03-21 NOTE — TELEPHONE ENCOUNTER
[facility]    Reason for call: Lab Result Notification     Lab Result (including Rx patient on, if applicable).  If culture, copy of lab report at bottom.  Lab Result:   Component      Latest Ref Rng 3/20/2024  11:35 AM   Norovirus Gl/Gll      Negative  Positive !       Legend:  ! Abnormal    Creatinine Level (mg/dl)   Creatinine   Date Value Ref Range Status   02/26/2022 0.90 0.66 - 1.25 mg/dL Final   01/11/2021 0.85 0.66 - 1.25 mg/dL Final    Creatinine clearance (ml/min), if applicable    Creatinine clearance cannot be calculated (Patient's most recent lab result is older than the maximum 365 days allowed.)     Patient's current Symptoms:   At 11:39P, Left voicemail message requesting a call back to [facility] ED Lab Result RN at [phone]. RN is available every day between 9 a.m. and 5:30 p.m.     At 1:36P, I'm back to normal.  Sx's fully resolved    RN Recommendations/Instructions per [facility] ED lab result protocol:   [facility] ED lab result protocol utilized: norovirus    Patient Education regarding Norovirus   Advise patient/parent that No Antibiotic Treatment is recommended.  Symptoms include vomiting, watery diarrhea, low grade fever, headache, muscle aches, fatigue, and stomach cramps, and symptoms usually last 1-3-days.  Very Contagious: Spread through stool of infected person such as by the improper handwashing after contact with stool or vomit).  Also can be spread through the air when person is vomiting.    Most common foodborne illness.  Incubation is 24-48 hours (ranges 12-72 hours) from exposure.  Symptoms last 12 to 60 hours.  Norovirus shedding in stool is maximal over the first 24 to 48 hours after illness  A person is maximal contagious in the first 24 - 48 hours of onset of symptoms.  Virus can be shed in the stool for up to 2-4 weeks after onset of illness. For immunocompromised Patients, they can be contagious for months following  infection.   Main goals in treatment is to prevent dehydration.    Prevention:  Norovirus is not killed by alcohol or standard cleaning agents.  Contaminated surfaces should be disinfected with bleach (5 -25 tbsp of household bleach per gallon of water).  Encourage washing hands with warm soapy water for 20 seconds after using the toilet, changing diapers, and before preparing or eating food. Thorough handwashing is the best way to prevent spread of Norovirus.  individuals with norovirus infection should not prepare food for others until at least two days after resolution of symptoms     Bridger Meek RN

## 2024-03-27 DIAGNOSIS — E78.5 HYPERLIPIDEMIA LDL GOAL <70: ICD-10-CM

## 2024-03-27 DIAGNOSIS — I25.10 CORONARY ARTERY DISEASE INVOLVING NATIVE CORONARY ARTERY OF NATIVE HEART WITHOUT ANGINA PECTORIS: ICD-10-CM

## 2024-03-27 DIAGNOSIS — E78.2 MIXED HYPERLIPIDEMIA: ICD-10-CM

## 2024-03-27 DIAGNOSIS — I25.10 NONOBSTRUCTIVE ATHEROSCLEROSIS OF CORONARY ARTERY: ICD-10-CM

## 2024-03-27 RX ORDER — EZETIMIBE 10 MG/1
10 TABLET ORAL DAILY
Qty: 90 TABLET | Refills: 3 | Status: SHIPPED | OUTPATIENT
Start: 2024-03-27

## 2024-03-27 RX ORDER — PRAVASTATIN SODIUM 80 MG/1
80 TABLET ORAL EVERY EVENING
Qty: 90 TABLET | Refills: 3 | Status: SHIPPED | OUTPATIENT
Start: 2024-03-27

## 2024-03-27 NOTE — TELEPHONE ENCOUNTER
Last Office Visit: 02/27/24 Dr. Napier  Next Office Visit: TBD  Last Fill Date: 02/28/24  Ese Martínez MA Cardiology   3/27/2024 8:53 AM

## 2024-03-27 NOTE — TELEPHONE ENCOUNTER
[facility] Cardiology Refill Guideline reviewed.  Medication meets criteria for refill.    Winter Zamora RN

## 2024-08-07 DIAGNOSIS — K21.00 GASTROESOPHAGEAL REFLUX DISEASE WITH ESOPHAGITIS WITHOUT HEMORRHAGE: ICD-10-CM

## 2024-08-28 ENCOUNTER — ANCILLARY PROCEDURE (OUTPATIENT)
Dept: GENERAL RADIOLOGY | Facility: CLINIC | Age: 79
End: 2024-08-28
Attending: FAMILY MEDICINE
Payer: COMMERCIAL

## 2024-08-28 ENCOUNTER — OFFICE VISIT (OUTPATIENT)
Dept: FAMILY MEDICINE | Facility: CLINIC | Age: 79
End: 2024-08-28
Payer: COMMERCIAL

## 2024-08-28 VITALS
HEIGHT: 70 IN | SYSTOLIC BLOOD PRESSURE: 138 MMHG | RESPIRATION RATE: 18 BRPM | WEIGHT: 168.2 LBS | TEMPERATURE: 97.4 F | BODY MASS INDEX: 24.08 KG/M2 | DIASTOLIC BLOOD PRESSURE: 68 MMHG | OXYGEN SATURATION: 96 % | HEART RATE: 68 BPM

## 2024-08-28 DIAGNOSIS — F32.5 MAJOR DEPRESSIVE DISORDER WITH SINGLE EPISODE, IN FULL REMISSION (H): ICD-10-CM

## 2024-08-28 DIAGNOSIS — F41.1 ANXIETY STATE: Primary | ICD-10-CM

## 2024-08-28 DIAGNOSIS — M79.674 PAIN OF TOE OF RIGHT FOOT: ICD-10-CM

## 2024-08-28 DIAGNOSIS — I10 BENIGN HYPERTENSION: ICD-10-CM

## 2024-08-28 LAB
ANION GAP SERPL CALCULATED.3IONS-SCNC: 9 MMOL/L (ref 7–15)
BUN SERPL-MCNC: 20.3 MG/DL (ref 8–23)
CALCIUM SERPL-MCNC: 9.4 MG/DL (ref 8.8–10.4)
CHLORIDE SERPL-SCNC: 105 MMOL/L (ref 98–107)
CREAT SERPL-MCNC: 0.96 MG/DL (ref 0.67–1.17)
EGFRCR SERPLBLD CKD-EPI 2021: 80 ML/MIN/1.73M2
GLUCOSE SERPL-MCNC: 100 MG/DL (ref 70–99)
HCO3 SERPL-SCNC: 26 MMOL/L (ref 22–29)
POTASSIUM SERPL-SCNC: 4.1 MMOL/L (ref 3.4–5.3)
SODIUM SERPL-SCNC: 140 MMOL/L (ref 135–145)
URATE SERPL-MCNC: 4.7 MG/DL (ref 3.4–7)

## 2024-08-28 PROCEDURE — 36415 COLL VENOUS BLD VENIPUNCTURE: CPT | Performed by: FAMILY MEDICINE

## 2024-08-28 PROCEDURE — G2211 COMPLEX E/M VISIT ADD ON: HCPCS | Performed by: FAMILY MEDICINE

## 2024-08-28 PROCEDURE — 99214 OFFICE O/P EST MOD 30 MIN: CPT | Performed by: FAMILY MEDICINE

## 2024-08-28 PROCEDURE — 73660 X-RAY EXAM OF TOE(S): CPT | Mod: TC | Performed by: RADIOLOGY

## 2024-08-28 PROCEDURE — 80048 BASIC METABOLIC PNL TOTAL CA: CPT | Performed by: FAMILY MEDICINE

## 2024-08-28 PROCEDURE — 84550 ASSAY OF BLOOD/URIC ACID: CPT | Performed by: FAMILY MEDICINE

## 2024-08-28 RX ORDER — AMLODIPINE BESYLATE 10 MG/1
10 TABLET ORAL DAILY
Qty: 90 TABLET | Refills: 3 | Status: SHIPPED | OUTPATIENT
Start: 2024-08-28

## 2024-08-28 RX ORDER — LORAZEPAM 1 MG/1
0.5 TABLET ORAL 3 TIMES DAILY PRN
Qty: 45 TABLET | Refills: 5 | Status: SHIPPED | OUTPATIENT
Start: 2024-08-28

## 2024-08-28 RX ORDER — SERTRALINE HYDROCHLORIDE 100 MG/1
TABLET, FILM COATED ORAL
Qty: 90 TABLET | Refills: 3 | Status: SHIPPED | OUTPATIENT
Start: 2024-08-28

## 2024-08-28 ASSESSMENT — ANXIETY QUESTIONNAIRES
5. BEING SO RESTLESS THAT IT IS HARD TO SIT STILL: NOT AT ALL
IF YOU CHECKED OFF ANY PROBLEMS ON THIS QUESTIONNAIRE, HOW DIFFICULT HAVE THESE PROBLEMS MADE IT FOR YOU TO DO YOUR WORK, TAKE CARE OF THINGS AT HOME, OR GET ALONG WITH OTHER PEOPLE: NOT DIFFICULT AT ALL
GAD7 TOTAL SCORE: 1
2. NOT BEING ABLE TO STOP OR CONTROL WORRYING: NOT AT ALL
3. WORRYING TOO MUCH ABOUT DIFFERENT THINGS: NOT AT ALL
GAD7 TOTAL SCORE: INCOMPLETE
GAD7 TOTAL SCORE: 1
1. FEELING NERVOUS, ANXIOUS, OR ON EDGE: SEVERAL DAYS
7. FEELING AFRAID AS IF SOMETHING AWFUL MIGHT HAPPEN: NOT AT ALL
6. BECOMING EASILY ANNOYED OR IRRITABLE: NOT AT ALL

## 2024-08-28 ASSESSMENT — PAIN SCALES - GENERAL: PAINLEVEL: SEVERE PAIN (6)

## 2024-08-28 ASSESSMENT — PATIENT HEALTH QUESTIONNAIRE - PHQ9
SUM OF ALL RESPONSES TO PHQ QUESTIONS 1-9: 0
5. POOR APPETITE OR OVEREATING: NOT AT ALL

## 2024-08-28 NOTE — PATIENT INSTRUCTIONS
Continue on the sertraline.   Continue on the ativan.     Lab work today     Xray today.     Follow up with podiatry.     Follow up with myself in 6 months or sooner if needed.

## 2024-08-28 NOTE — PROGRESS NOTES
Assessment & Plan     Anxiety state  Major depressive disorder with single episode, in full remission (H24)  Stable on current regimen. Refills provided today. Understands the risks of ativan and wishes to continue on this.   -- follow up with myself in 6 months or sooner if needed.   - sertraline (ZOLOFT) 100 MG tablet  Dispense: 90 tablet; Refill: 3  - LORazepam (ATIVAN) 1 MG tablet  Dispense: 45 tablet; Refill: 5      Pain of toe of right foot  Kicked door frame no August 1. Continues to have some discomfort in the foot. Has been ambulating on this.   -- obtain xray today. Obtain uric acid level.   -- wishes to see podiatry, referral placed.   - XR Toe Right G/E 2 Views  - Uric acid  - Orthopedic  Referral    Benign hypertension  Stable. Refills provided today. Check BMP   - amLODIPine (NORVASC) 10 MG tablet  Dispense: 90 tablet; Refill: 3  - Basic metabolic panel  (Ca, Cl, CO2, Creat, Gluc, K, Na, BUN)      The risks, benefits and treatment options of prescribed medications or other treatments have been discussed with the patient. The patient verbalized their understanding and should call or follow up if no improvement or if they develop further problems.    The longitudinal plan of care for the diagnosis(es)/condition(s) as documented were addressed during this visit. Due to the added complexity in care, I will continue to support patient in the subsequent management and with ongoing continuity of care.          Dion Montano is a 79 year old, presenting for the following health issues:  Anxiety (Pt here for 6 month follow up on anxiety and depression.)    History of Present Illness       Mental Health Follow-up:  Patient presents to follow-up on Depression & Anxiety.Patient's depression since last visit has been:  Good  The patient is not having other symptoms associated with depression.  Patient's anxiety since last visit has been:  Good  The patient is not having other symptoms associated  "with anxiety.  Any significant life events: No  Patient is feeling anxious or having panic attacks.  Patient has no concerns about alcohol or drug use.    He eats 2-3 servings of fruits and vegetables daily.He consumes 2 sweetened beverage(s) daily.He exercises with enough effort to increase his heart rate 60 or more minutes per day.  He exercises with enough effort to increase his heart rate 7 days per week.   He is taking medications regularly.     79 year old male who presents to clinic for follow up on anxiety and depression.     Anxiety  Depression  Does not feel depressed. Continues to have some issues with anxiety.   Sertraline 50 mg in AM, 50 mg in PM.   Ativan 0.5 mg three times daily.   No alcohol use for the last 7 years.   Stable, overall doing well at this time.   Wishes to continue on current regimen.       Big toe right foot  Kicked the door frame on August 1.   Occasional pain with walking.   Will get a throbbing pain at times.   Having some swelling in the toe.   Using aleve for the pain, takes 3 tablets per day.   Wishes to see Podiatry.         Review of Systems  Constitutional, HEENT, cardiovascular, pulmonary, gi and gu systems are negative, except as otherwise noted.      Objective    /68   Pulse 68   Temp 97.4  F (36.3  C) (Tympanic)   Resp 18   Ht 1.778 m (5' 10\")   Wt 76.3 kg (168 lb 3.2 oz)   SpO2 96%   BMI 24.13 kg/m    Body mass index is 24.13 kg/m .  Physical Exam   General: alert, cooperative, no acute distress   CV: RRR, no murmur  Resp: non-labored breathing, clear to auscultation, no wheezing or rales   Foot right: big toe with mild swelling, nail thickening. able to flex and extend at IP joint mild discomfort with this. Sensation intact.     Signed Electronically by: Vazquez Marquez DO    "

## 2024-08-30 ENCOUNTER — HOSPITAL ENCOUNTER (EMERGENCY)
Facility: CLINIC | Age: 79
Discharge: HOME | End: 2024-08-30
Attending: EMERGENCY MEDICINE | Admitting: EMERGENCY MEDICINE
Payer: COMMERCIAL

## 2024-08-30 ENCOUNTER — APPOINTMENT (OUTPATIENT)
Dept: MRI IMAGING | Facility: CLINIC | Age: 79
End: 2024-08-30
Attending: EMERGENCY MEDICINE
Payer: COMMERCIAL

## 2024-08-30 VITALS
SYSTOLIC BLOOD PRESSURE: 149 MMHG | WEIGHT: 168 LBS | RESPIRATION RATE: 18 BRPM | BODY MASS INDEX: 24.05 KG/M2 | HEIGHT: 70 IN | TEMPERATURE: 97.9 F | HEART RATE: 78 BPM | OXYGEN SATURATION: 94 % | DIASTOLIC BLOOD PRESSURE: 81 MMHG

## 2024-08-30 DIAGNOSIS — R42 DIZZINESS: ICD-10-CM

## 2024-08-30 DIAGNOSIS — R42 VERTIGO: ICD-10-CM

## 2024-08-30 LAB
ALBUMIN UR-MCNC: NEGATIVE MG/DL
ANION GAP SERPL CALCULATED.3IONS-SCNC: 12 MMOL/L (ref 7–15)
APPEARANCE UR: CLEAR
BASOPHILS # BLD AUTO: 0.1 10E3/UL (ref 0–0.2)
BASOPHILS NFR BLD AUTO: 1 %
BILIRUB UR QL STRIP: NEGATIVE
BUN SERPL-MCNC: 16.4 MG/DL (ref 8–23)
CALCIUM SERPL-MCNC: 9.1 MG/DL (ref 8.8–10.4)
CHLORIDE SERPL-SCNC: 103 MMOL/L (ref 98–107)
COLOR UR AUTO: YELLOW
CREAT SERPL-MCNC: 0.86 MG/DL (ref 0.67–1.17)
EGFRCR SERPLBLD CKD-EPI 2021: 88 ML/MIN/1.73M2
EOSINOPHIL # BLD AUTO: 0.1 10E3/UL (ref 0–0.7)
EOSINOPHIL NFR BLD AUTO: 2 %
ERYTHROCYTE [DISTWIDTH] IN BLOOD BY AUTOMATED COUNT: 12.7 % (ref 10–15)
GLUCOSE SERPL-MCNC: 108 MG/DL (ref 70–99)
GLUCOSE UR STRIP-MCNC: NEGATIVE MG/DL
HCO3 SERPL-SCNC: 22 MMOL/L (ref 22–29)
HCT VFR BLD AUTO: 37.4 % (ref 40–53)
HGB BLD-MCNC: 12.4 G/DL (ref 13.3–17.7)
HGB UR QL STRIP: NEGATIVE
IMM GRANULOCYTES # BLD: 0 10E3/UL
IMM GRANULOCYTES NFR BLD: 0 %
KETONES UR STRIP-MCNC: NEGATIVE MG/DL
LEUKOCYTE ESTERASE UR QL STRIP: NEGATIVE
LYMPHOCYTES # BLD AUTO: 0.7 10E3/UL (ref 0.8–5.3)
LYMPHOCYTES NFR BLD AUTO: 11 %
MCH RBC QN AUTO: 32 PG (ref 26.5–33)
MCHC RBC AUTO-ENTMCNC: 33.2 G/DL (ref 31.5–36.5)
MCV RBC AUTO: 97 FL (ref 78–100)
MONOCYTES # BLD AUTO: 0.5 10E3/UL (ref 0–1.3)
MONOCYTES NFR BLD AUTO: 7 %
MUCOUS THREADS #/AREA URNS LPF: PRESENT /LPF
NEUTROPHILS # BLD AUTO: 4.9 10E3/UL (ref 1.6–8.3)
NEUTROPHILS NFR BLD AUTO: 78 %
NITRATE UR QL: NEGATIVE
NRBC # BLD AUTO: 0 10E3/UL
NRBC BLD AUTO-RTO: 0 /100
PH UR STRIP: 5 [PH] (ref 5–7)
PLATELET # BLD AUTO: 163 10E3/UL (ref 150–450)
POTASSIUM SERPL-SCNC: 4.3 MMOL/L (ref 3.4–5.3)
RBC # BLD AUTO: 3.87 10E6/UL (ref 4.4–5.9)
RBC URINE: <1 /HPF
SODIUM SERPL-SCNC: 137 MMOL/L (ref 135–145)
SP GR UR STRIP: 1.01 (ref 1–1.03)
SQUAMOUS EPITHELIAL: <1 /HPF
TROPONIN T SERPL HS-MCNC: 17 NG/L
TROPONIN T SERPL HS-MCNC: 24 NG/L
UROBILINOGEN UR STRIP-MCNC: NORMAL MG/DL
WBC # BLD AUTO: 6.3 10E3/UL (ref 4–11)
WBC URINE: 1 /HPF

## 2024-08-30 PROCEDURE — 93005 ELECTROCARDIOGRAM TRACING: CPT

## 2024-08-30 PROCEDURE — 99285 EMERGENCY DEPT VISIT HI MDM: CPT | Mod: 25

## 2024-08-30 PROCEDURE — 99284 EMERGENCY DEPT VISIT MOD MDM: CPT | Performed by: EMERGENCY MEDICINE

## 2024-08-30 PROCEDURE — 85025 COMPLETE CBC W/AUTO DIFF WBC: CPT | Performed by: EMERGENCY MEDICINE

## 2024-08-30 PROCEDURE — A9585 GADOBUTROL INJECTION: HCPCS | Performed by: EMERGENCY MEDICINE

## 2024-08-30 PROCEDURE — 255N000002 HC RX 255 OP 636: Performed by: EMERGENCY MEDICINE

## 2024-08-30 PROCEDURE — 84484 ASSAY OF TROPONIN QUANT: CPT | Mod: 91 | Performed by: EMERGENCY MEDICINE

## 2024-08-30 PROCEDURE — 80048 BASIC METABOLIC PNL TOTAL CA: CPT | Performed by: EMERGENCY MEDICINE

## 2024-08-30 PROCEDURE — 258N000003 HC RX IP 258 OP 636: Performed by: EMERGENCY MEDICINE

## 2024-08-30 PROCEDURE — 81001 URINALYSIS AUTO W/SCOPE: CPT | Performed by: EMERGENCY MEDICINE

## 2024-08-30 PROCEDURE — 96360 HYDRATION IV INFUSION INIT: CPT | Mod: 59

## 2024-08-30 PROCEDURE — 250N000013 HC RX MED GY IP 250 OP 250 PS 637: Performed by: EMERGENCY MEDICINE

## 2024-08-30 PROCEDURE — 70553 MRI BRAIN STEM W/O & W/DYE: CPT

## 2024-08-30 PROCEDURE — 93010 ELECTROCARDIOGRAM REPORT: CPT | Performed by: EMERGENCY MEDICINE

## 2024-08-30 PROCEDURE — 36415 COLL VENOUS BLD VENIPUNCTURE: CPT | Performed by: EMERGENCY MEDICINE

## 2024-08-30 RX ORDER — MECLIZINE HYDROCHLORIDE 25 MG/1
25 TABLET ORAL ONCE
Status: COMPLETED | OUTPATIENT
Start: 2024-08-30 | End: 2024-08-30

## 2024-08-30 RX ORDER — GADOBUTROL 604.72 MG/ML
7.5 INJECTION INTRAVENOUS ONCE
Status: COMPLETED | OUTPATIENT
Start: 2024-08-30 | End: 2024-08-30

## 2024-08-30 RX ORDER — MECLIZINE HCL 12.5 MG 12.5 MG/1
12.5 TABLET ORAL 4 TIMES DAILY PRN
Qty: 30 TABLET | Refills: 0 | Status: SHIPPED | OUTPATIENT
Start: 2024-08-30 | End: 2024-09-04

## 2024-08-30 RX ADMIN — SODIUM CHLORIDE 500 ML: 9 INJECTION, SOLUTION INTRAVENOUS at 11:56

## 2024-08-30 RX ADMIN — MECLIZINE HYDROCHLORIDE 25 MG: 25 TABLET ORAL at 09:07

## 2024-08-30 RX ADMIN — GADOBUTROL 7.5 ML: 604.72 INJECTION INTRAVENOUS at 10:06

## 2024-08-30 ASSESSMENT — ACTIVITIES OF DAILY LIVING (ADL)
ADLS_ACUITY_SCORE: 35

## 2024-08-30 ASSESSMENT — COLUMBIA-SUICIDE SEVERITY RATING SCALE - C-SSRS
6. HAVE YOU EVER DONE ANYTHING, STARTED TO DO ANYTHING, OR PREPARED TO DO ANYTHING TO END YOUR LIFE?: NO
1. IN THE PAST MONTH, HAVE YOU WISHED YOU WERE DEAD OR WISHED YOU COULD GO TO SLEEP AND NOT WAKE UP?: NO
2. HAVE YOU ACTUALLY HAD ANY THOUGHTS OF KILLING YOURSELF IN THE PAST MONTH?: NO

## 2024-08-30 NOTE — ED PROVIDER NOTES
History   No chief complaint on file.    HPI  Dusty Ruggiero is a 79 year old male with past medical history significant for anxiety hypertension history of osteoarthritis iron deficiency anemia presents emergency department with dizziness.  And previous history of dizziness/vertigo who presents to the emergency department complaining of dizziness.  Patient states he went to bed at around 10:00 last night when he lied down he began having spinning sensation with spinning this occurred with sitting up and lying down he got up to go to the bathroom and had to hold onto the wall.  He went to bed and slept but when he got up again this morning he continued to have dizziness with lying down and sitting up and positional changes.  States he has not had any visual field deficits or vision changes denies headache has not any neck pain denies any chest pain or shortness of breath has not had any abdominal pain or back pain denies any bowel or bladder dysfunction has not had focal numbness weakness any extremity denies calf pain or leg swelling has not had a rash.    Allergies:  Allergies   Allergen Reactions    Atenolol Shortness Of Breath     Other reaction(s): Unknown  Other reaction(s): Hard to breath    Atenolol Difficulty breathing     Rapid heart beat    Atorvastatin Muscle Pain (Myalgia)     Other reaction(s): Myalgias    Buspirone Difficulty breathing and Other (See Comments)     Other reaction(s): Unknown  Rapid heart beat    Coreg [Carvedilol] Dizziness    Gabapentin Other (See Comments)     Other reaction(s): Dizzy  Dizzy      Lyrica [Pregabalin] Other (See Comments)     Other reaction(s): dizziness  Dizzy      Benadryl [Diphenhydramine] Anxiety     Other reaction(s): excitablity    Diazepam Anxiety     Nausea and vomiting    Vistaril [Hydroxyzine] Anxiety     Other reaction(s): dizzy,HA, nausea         Problem List:    Patient Active Problem List    Diagnosis Date Noted    Chronic right-sided low back pain  12/01/2023     Priority: Medium    Nonobstructive atherosclerosis of coronary artery 01/27/2023     Priority: Medium    Premature atrial contractions 01/27/2023     Priority: Medium    Paroxysmal supraventricular tachycardia (H24) 01/27/2023     Priority: Medium    Status post coronary angiogram 01/11/2021     Priority: Medium    Abnormal stress test 12/29/2020     Priority: Medium     Added automatically from request for surgery [id number]      NSVT (nonsustained ventricular tachycardia) (H) 12/29/2020     Priority: Medium     Added automatically from request for surgery [id number]      Ventricular tachycardia (H) 11/11/2020     Priority: Medium    History of colectomy 08/21/2020     Priority: Medium    Esophageal reflux 10/17/2018     Priority: Medium    Benign hypertension 08/16/2018     Priority: Medium    Hereditary and idiopathic peripheral neuropathy 08/16/2018     Priority: Medium    Iron deficiency anemia due to chronic blood loss 08/16/2018     Priority: Medium    Major depressive disorder with single episode, in full remission (H24) 08/16/2018     Priority: Medium     Started about 2017      Palpitations 08/16/2018     Priority: Medium    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     Priority: Medium    Disorder of right rotator cuff 08/16/2018     Priority: Medium    Hyperlipidemia LDL goal <70 10/02/2017     Priority: Medium    Benign prostatic hyperplasia with urinary obstruction 08/30/2013     Priority: Medium    Vitamin D deficiency 01/29/2013     Priority: Medium    Osteoarthritis 02/07/2009     Priority: Medium    DDD (degenerative disc disease), lumbar 02/07/2009     Priority: Medium    Lumbar facet arthropathy 02/07/2009     Priority: Medium    Ulcerative colitis (H) 02/07/2009     Priority: Medium    Anxiety state 12/06/2007     Priority: Medium    Knee joint replacement by other means 12/06/2007     Priority: Medium     Overview:   with Dr. Garcia 12-6-2007      Pure hypercholesterolemia 12/06/2007      Priority: Medium        Past Medical History:    Past Medical History:   Diagnosis Date    Generalized osteoarthrosis, unspecified site     Other anxiety states     Prostatitis, unspecified     Ulcerative colitis, unspecified     Unspecified essential hypertension        Past Surgical History:    Past Surgical History:   Procedure Laterality Date    ARTHROSCOPY SHOULDER, OPEN ROTATOR CUFF REPAIR, COMBINED Right 3/4/2016    Procedure: COMBINED ARTHROSCOPY SHOULDER, OPEN ROTATOR CUFF REPAIR;  Surgeon: Wily Kan MD;  Location: [facility] OR    CV LEFT HEART CATH N/A 2021    Procedure: Left Heart Cath;  Surgeon: Mike Camara MD;  Location:  HEART CARDIAC CATH LAB    FLEXIBLE SIGMOIDOSCOPY  1999    PHACOEMULSIFICATION CLEAR CORNEA WITH TORIC INTRAOCULAR LENS IMPLANT Right 2018    Procedure: PHACOEMULSIFICATION CLEAR CORNEA WITH TORIC INTRAOCULAR LENS IMPLANT;  Right Cataract Removal with Implant--Toric Lens;  Surgeon: Mike Collier MD;  Location: [facility] OR    PHACOEMULSIFICATION CLEAR CORNEA WITH TORIC INTRAOCULAR LENS IMPLANT Left 3/12/2018    Procedure: PHACOEMULSIFICATION CLEAR CORNEA WITH TORIC INTRAOCULAR LENS IMPLANT;  Left Cataract Removal with Implant--Toric lens;  Surgeon: Mike Collier MD;  Location: [facility] OR    SURGICAL HISTORY OF -       Colostomy  with ileostomy    SURGICAL HISTORY OF -       Colostomy Reversal    SURGICAL HISTORY OF -       Left Shoulder Rotator Cuff Surgery       Family History:    Family History   Problem Relation Age of Onset    Skin Cancer Father     Breast Cancer Sister     Melanoma No family hx of        Social History:  Marital Status:   [2]  Social History     Tobacco Use    Smoking status: Former     Current packs/day: 0.00     Average packs/day: 0.3 packs/day for 20.0 years (5.0 ttl pk-yrs)     Types: Cigarettes     Start date: 1975     Quit date: 1995     Years since quittin.5    Smokeless tobacco: Never   Vaping  Use    Vaping status: Never Used   Substance Use Topics    Alcohol use: No     Comment: stopped a couple of months    Drug use: No        Medications:    amLODIPine (NORVASC) 10 MG tablet  aspirin (ASA) 81 MG EC tablet  Biotin 69898 MCG TABS  Cholecalciferol (VITAMIN D3 PO)  Coenzyme Q10 (COQ-10 PO)  ezetimibe (ZETIA) 10 MG tablet  ferrous sulfate 325 (65 FE) MG tablet  LORazepam (ATIVAN) 1 MG tablet  Naproxen Sodium (ALEVE PO)  omeprazole (PRILOSEC) 20 MG DR capsule  ondansetron (ZOFRAN ODT) 4 MG ODT tab  pravastatin (PRAVACHOL) 80 MG tablet  psyllium (METAMUCIL) 58.6 % packet  sertraline (ZOLOFT) 100 MG tablet  Zinc 30 MG TABS          Review of Systems  As per HPI.  Physical Exam          Physical Exam  Vitals and nursing note reviewed.   Constitutional:       General: He is not in acute distress.     Appearance: Normal appearance. He is not ill-appearing, toxic-appearing or diaphoretic.   HENT:      Head: Normocephalic and atraumatic.      Nose: Nose normal.      Mouth/Throat:      Mouth: Mucous membranes are moist.      Pharynx: Oropharynx is clear.   Eyes:      Extraocular Movements: Extraocular movements intact.      Conjunctiva/sclera: Conjunctivae normal.      Pupils: Pupils are equal, round, and reactive to light.      Comments: Lateral nystagmus left greater than right.  No vertical: Nystagmus noted patient gets dizzy with following eye movements.  Reads fine print without difficulty   Cardiovascular:      Rate and Rhythm: Normal rate and regular rhythm.      Pulses: Normal pulses.      Heart sounds: Normal heart sounds. No murmur heard.  Pulmonary:      Effort: Pulmonary effort is normal.      Breath sounds: Normal breath sounds. No stridor. No wheezing or rhonchi.   Abdominal:      General: Abdomen is flat. Bowel sounds are normal. There is no distension.      Palpations: Abdomen is soft.      Tenderness: There is no abdominal tenderness. There is no right CVA tenderness or left CVA tenderness.    Musculoskeletal:         General: No tenderness. Normal range of motion.      Cervical back: Normal range of motion and neck supple.      Right lower leg: No edema.      Left lower leg: No edema.   Skin:     General: Skin is warm and dry.      Capillary Refill: Capillary refill takes less than 2 seconds.      Findings: No rash.   Neurological:      General: No focal deficit present.      Mental Status: He is alert and oriented to person, place, and time.      Cranial Nerves: No cranial nerve deficit.      Sensory: No sensory deficit.      Motor: No weakness.      Coordination: Coordination normal.      Comments: Hallpike maneuver is mildly positive but much improved from earlier   Psychiatric:         Mood and Affect: Mood normal.         ED Course        Procedures              EKG Interpretation:           EKG Interpretation:      Interpreted by Sahil Chacko MD  Rhythm: Normal sinus   Rate: Normal  Axis: Left Axis Deviation  Ectopy: None and Premature atrial contraction  Conduction: Right bundle branch block (complete):bifascicular block  ST Segments/ T Waves: No ST-T wave changes, No acute ischemic changes, and Non-specific ST-T wave changes  Q Waves: None  Comparison to prior: Unchanged from 3/19/24    Clinical Impression: Normal sinus rhythm with left axis right bundle branch block and nonspecific ST-T wave abnormality.    Critical Care time:  none               Results for orders placed or performed during the hospital encounter of 08/30/24 (from the past 24 hour(s))   CBC with platelets differential    Narrative    The following orders were created for panel order CBC with platelets differential.  Procedure                               Abnormality         Status                     ---------                               -----------         ------                     CBC with platelets and d...[[id number]]  Abnormal            Final result                 Please view results for these tests on the  individual orders.   Basic metabolic panel   Result Value Ref Range    Sodium 137 135 - 145 mmol/L    Potassium 4.3 3.4 - 5.3 mmol/L    Chloride 103 98 - 107 mmol/L    Carbon Dioxide (CO2) 22 22 - 29 mmol/L    Anion Gap 12 7 - 15 mmol/L    Urea Nitrogen 16.4 8.0 - 23.0 mg/dL    Creatinine 0.86 0.67 - 1.17 mg/dL    GFR Estimate 88 >60 mL/min/1.73m2    Calcium 9.1 8.8 - 10.4 mg/dL    Glucose 108 (H) 70 - 99 mg/dL   Troponin T, High Sensitivity   Result Value Ref Range    Troponin T, High Sensitivity 24 (H) <=22 ng/L   CBC with platelets and differential   Result Value Ref Range    WBC Count 6.3 4.0 - 11.0 10e3/uL    RBC Count 3.87 (L) 4.40 - 5.90 10e6/uL    Hemoglobin 12.4 (L) 13.3 - 17.7 g/dL    Hematocrit 37.4 (L) 40.0 - 53.0 %    MCV 97 78 - 100 fL    MCH 32.0 26.5 - 33.0 pg    MCHC 33.2 31.5 - 36.5 g/dL    RDW 12.7 10.0 - 15.0 %    Platelet Count 163 150 - 450 10e3/uL    % Neutrophils 78 %    % Lymphocytes 11 %    % Monocytes 7 %    % Eosinophils 2 %    % Basophils 1 %    % Immature Granulocytes 0 %    NRBCs per 100 WBC 0 <1 /100    Absolute Neutrophils 4.9 1.6 - 8.3 10e3/uL    Absolute Lymphocytes 0.7 (L) 0.8 - 5.3 10e3/uL    Absolute Monocytes 0.5 0.0 - 1.3 10e3/uL    Absolute Eosinophils 0.1 0.0 - 0.7 10e3/uL    Absolute Basophils 0.1 0.0 - 0.2 10e3/uL    Absolute Immature Granulocytes 0.0 <=0.4 10e3/uL    Absolute NRBCs 0.0 10e3/uL   UA with Microscopic reflex to Culture    Specimen: Urine, Clean Catch   Result Value Ref Range    Color Urine Yellow Colorless, Straw, Light Yellow, Yellow    Appearance Urine Clear Clear    Glucose Urine Negative Negative mg/dL    Bilirubin Urine Negative Negative    Ketones Urine Negative Negative mg/dL    Specific Gravity Urine 1.015 1.003 - 1.035    Blood Urine Negative Negative    pH Urine 5.0 5.0 - 7.0    Protein Albumin Urine Negative Negative mg/dL    Urobilinogen Urine Normal Normal, 2.0 mg/dL    Nitrite Urine Negative Negative    Leukocyte Esterase Urine Negative  Negative    Mucus Urine Present (A) None Seen /LPF    RBC Urine <1 <=2 /HPF    WBC Urine 1 <=5 /HPF    Squamous Epithelials Urine <1 <=1 /HPF    Narrative    Urine Culture not indicated   MR Brain w/o & w Contrast    Narrative     MR BRAIN W/O & W CONTRAST 8/30/2024 10:11 AM    Provided History: Dizziness starting last night with ataxia..    Comparison: Brain MRI 2/26/2022.    Technique: Multiplanar multisequence Brain MR without and with  contrast.    Contrast: 7.5 mL Gadavist     Findings:  There is no mass effect, midline shift, or evidence of intracranial  hemorrhage. The ventricles are proportionate to the cerebral sulci.  Normal major vascular intracranial flow-voids. Mild generalized  cerebral atrophy. Mild leukoaraiosis.    Postcontrast images demonstrate no abnormal intracranial enhancement.    No abnormality of the skull marrow signal. The visualized portions of  paranasal sinuses, and mastoid air cells are relatively clear. The  orbits are grossly unremarkable.      Impression    Impression:    1. No acute intracranial pathology.  2. No abnormal intracranial contrast enhancement.   3. Mild generalized cerebral atrophy and mild chronic small vessel  ischemic disease.    TYLOR PERRY MD         SYSTEM ID:  [id number]   Troponin T, High Sensitivity   Result Value Ref Range    Troponin T, High Sensitivity 17 <=22 ng/L       Medications   sodium chloride 0.9% BOLUS 500 mL (0 mLs Intravenous Stopped 8/30/24 1307)   meclizine (ANTIVERT) tablet 25 mg (25 mg Oral $Given 8/30/24 0907)   gadobutrol (GADAVIST) injection 7.5 mL (7.5 mLs Intravenous $Given 8/30/24 1006)       Assessments & Plan (with Medical Decision Making) records were reviewed including past medical history medications and allergies.  Office visit to family medicine on 8/28/2024 for anxiety was reviewed.  ED visit on 3/19/2024 for symptomatic bradycardia with reviewed.  Patient was given Antivert and IV fluids.  Labs were obtained.  I  independently reviewed and interpreted labs.  EKG revealed a normal sinus rhythm with left axis and right bundle branch block and nonspecific ST T wave changes no significant change from previous EKG.  BC was without significant abnormality.  Metabolic panel was unremarkable.  Urine analysis was without abnormality.  Troponin was 20 4 repeat troponin 2 hours later was 17.  Due to patient's dizziness an MRI scan of the brain was discussed with the patient and this was obtained.  I independently reviewed this and agree with radiologist findings of no acute intracranial pathology.  There is no abnormal intracranial contrast enhancement.  There is mild generalized cerebral atrophy and mild chronic small vessel ischemic disease.  Patient on reexamination he is not having significant dizziness at this time he is able to move his head without difficulty and able to position change without difficulty.  I feel this is more than likely a benign positional vertigo.  I may send the patient home with meclizine and have him follow-up with his primary care physician early next week for recheck.  If any severe headaches worsening dizziness, chest pain, shortness of breath, focal numbness weakness any extremity or other symptoms present patient should immediately return for further evaluation and care.  Patient and wife feel comfortable with this plan.     I have reviewed the nursing notes.    I have reviewed the findings, diagnosis, plan and need for follow up with the patient.             New Prescriptions    No medications on file       Final diagnoses:   Dizziness   Vertigo       8/30/2024   [facility] EMERGENCY DEPT       Sahil Chacko MD  08/31/24 0815

## 2024-08-30 NOTE — Clinical Note
Dusty Ruggiero was seen and treated in our emergency department on 8/30/2024.    Mr Lynch was a patient here at our facility on 8- seeking seeking treatment  for a potentially life threatening complaint. He required an extensive work-up and monitoring preventing him from leaving [facility] until MD approval.     Sincerely,     [facility] Emergency Dept

## 2024-08-30 NOTE — Clinical Note
Dusty Ruggiero was seen and treated in our emergency department on 8/30/2024.    Mr Lynch was a patient here at our facility on 8- seeking seeking treatment  for a potentially life threatening complaint. He required an extensive work-up and monitoring preventing him from leaving [facility] until MD approval.     Sincerely,     [facility] Emergency Dept

## 2024-08-30 NOTE — ED TRIAGE NOTES
Pt presents with environmental spinning dizziness that began when he lied down to go to bed at 2200 last night. Pt got up to go to the bathroom and had to hold onto walls to walk. No other neurological deficits. Hx of low HR at times.     Triage Assessment (Adult)       Row Name 08/30/24 0753          Triage Assessment    Airway WDL WDL        Respiratory WDL    Respiratory WDL WDL        Skin Circulation/Temperature WDL    Skin Circulation/Temperature WDL WDL        Cardiac WDL    Cardiac WDL X;rhythm     Pulse Rate & Regularity bradycardic  runs of bradycardia        Peripheral/Neurovascular WDL    Peripheral Neurovascular WDL WDL        Cognitive/Neuro/Behavioral WDL    Cognitive/Neuro/Behavioral WDL X  environmental spinning when lyng down and sitting up.     Level of Consciousness alert     Arousal Level opens eyes spontaneously     Orientation oriented x 4     Speech clear     Mood/Behavior calm;cooperative        Pupils (CN II)    Pupil PERRLA yes     Pupil Size Left 3 mm     Pupil Size Right 3 mm        [place] Coma Scale    Best Eye Response 4-->(E4) spontaneous     Best Motor Response 6-->(M6) obeys commands     Best Verbal Response 5-->(V5) oriented     [place] Coma Scale Score 15

## 2024-08-30 NOTE — DISCHARGE INSTRUCTIONS
Return if symptoms worsen or new symptoms develop.  Drink plenty of fluids take meclizine as needed.  Stand up slowly if any difficulty walking severe headache vomiting focal numbness weakness any extremity or other symptoms present please return for further evaluation and care.

## 2024-09-03 ENCOUNTER — PATIENT OUTREACH (OUTPATIENT)
Dept: FAMILY MEDICINE | Facility: CLINIC | Age: 79
End: 2024-09-03
Payer: COMMERCIAL

## 2024-09-03 NOTE — TELEPHONE ENCOUNTER
Transitions of Care Outreach  Chief Complaint   Patient presents with    Hospital F/U     Most Recent Admission Date: 8/30/2024   Most Recent Admission Diagnosis:      Most Recent Discharge Date: 8/30/2024   Most Recent Discharge Diagnosis: Dizziness - R42  Vertigo - R42     Transitions of Care Assessment    Discharge Assessment  How are you doing now that you are home?: Good if taking the meclizine  How are your symptoms? (Red Flag symptoms escalate to triage hotline per guidelines): Improved  Do you know how to contact your clinic care team if you have future questions or changes to your health status? : Yes  Does the patient have their discharge instructions? : Yes  Does the patient have questions regarding their discharge instructions? : No  Were you started on any new medications or were there changes to any of your previous medications? : Yes (Meclizine)  Does the patient have all of their medications?: Yes  Do you have questions regarding any of your medications? : No  Do you have all of your needed medical supplies or equipment (DME)?  (i.e. oxygen tank, CPAP, cane, etc.): Yes    Follow up Plan          Future Appointments   Date Time Provider Department Center   9/4/2024 11:00 AM Tony Herron MD [facility]       Outpatient Plan as outlined on AVS reviewed with patient.    For any urgent concerns, please contact our 24 hour nurse triage line: [phone] ([phone])       Trish Monroe RN

## 2024-09-04 ENCOUNTER — OFFICE VISIT (OUTPATIENT)
Dept: FAMILY MEDICINE | Facility: CLINIC | Age: 79
End: 2024-09-04
Payer: COMMERCIAL

## 2024-09-04 VITALS
RESPIRATION RATE: 16 BRPM | HEIGHT: 70 IN | SYSTOLIC BLOOD PRESSURE: 140 MMHG | HEART RATE: 50 BPM | BODY MASS INDEX: 24.31 KG/M2 | WEIGHT: 169.8 LBS | DIASTOLIC BLOOD PRESSURE: 76 MMHG | OXYGEN SATURATION: 98 % | TEMPERATURE: 98 F

## 2024-09-04 DIAGNOSIS — H81.13 BENIGN PAROXYSMAL POSITIONAL VERTIGO, BILATERAL: Primary | ICD-10-CM

## 2024-09-04 DIAGNOSIS — I10 BENIGN HYPERTENSION: ICD-10-CM

## 2024-09-04 PROCEDURE — 99214 OFFICE O/P EST MOD 30 MIN: CPT | Performed by: FAMILY MEDICINE

## 2024-09-04 PROCEDURE — G2211 COMPLEX E/M VISIT ADD ON: HCPCS | Performed by: FAMILY MEDICINE

## 2024-09-04 RX ORDER — MECLIZINE HCL 12.5 MG 12.5 MG/1
12.5 TABLET ORAL 4 TIMES DAILY PRN
Qty: 30 TABLET | Refills: 0 | Status: SHIPPED | OUTPATIENT
Start: 2024-09-04

## 2024-09-04 ASSESSMENT — PAIN SCALES - GENERAL: PAINLEVEL: NO PAIN (0)

## 2024-09-04 NOTE — PATIENT INSTRUCTIONS
Vertigo is usually associated with imablance or disturbance of the fluid in the inner ear.  Usuallly, this is self-limited after several days to a few weeks.  Take Meclizine as directed; do not take when driving or operating machines.  If you have ongoing vertigo, do not drive or operate machines.  If your symptoms get worse or does not resolve in 2-3 weeks, see doctor again.  If you have severe headache, blurred vision, double vision, one-sided body weakness or feeling faint, go to ER or see doctor immediately.     If symptoms stay same after 2 weeks, ask for referral to vertigo clinic.    Schedule wellness exam plus blood pressure recheck in 4-6 weeks.    Measure resting blood pressure at home at least once a day, and as needed if you have dizziness or other symptoms.  You may log measurements in a small notebook.    Goal blood pressure is to be below 140/90    Contact the care team if your blood pressures are frequently out of range or if you have any concerning symptoms.

## 2024-09-04 NOTE — PROGRESS NOTES
Assessment & Plan     Benign paroxysmal positional vertigo, bilateral  Still recurs albeit patient reports mild improvement.  Discussed more definitive treatment through vertigo clinic. He defers referral due to no success [place] [facility] [place]  Advised safe use of meclizine, and advised of possible side effects.  Fall precautions reinforced.  Return precautions discussed and given to patient.   Refer to physical therapy if still recurs after 2 weeks.  - meclizine (ANTIVERT) 12.5 MG tablet  Dispense: 30 tablet; Refill: 0    Benign hypertension  Borderline high BP today. Already on amlodipine 10 mg daily.  Advised home monitoring and when to contact care team.  Recheck BP in wellness exam in 4-6 weeks. Patient to schedule.  Reinforced sodium restrictions.  Return precautions discussed and given to patient.           MED REC REQUIRED  Post Medication Reconciliation Status: discharge medications reconciled, continue medications without change    Patient Instructions   Vertigo is usually associated with imablance or disturbance of the fluid in the inner ear.  Usuallly, this is self-limited after several days to a few weeks.  Take Meclizine as directed; do not take when driving or operating machines.  If you have ongoing vertigo, do not drive or operate machines.  If your symptoms get worse or does not resolve in 2-3 weeks, see doctor again.  If you have severe headache, blurred vision, double vision, one-sided body weakness or feeling faint, go to ER or see doctor immediately.     If symptoms stay same after 2 weeks, ask for referral to vertigo clinic.    Schedule wellness exam plus blood pressure recheck in 4-6 weeks.    Measure resting blood pressure at home at least once a day, and as needed if you have dizziness or other symptoms.  You may log measurements in a small notebook.    Goal blood pressure is to be below 140/90    Contact the care team if your blood pressures are  frequently out of range or if you have any concerning symptoms.     Subjective   Dusty is a 79 year old, presenting for the following health issues:  ER F/U        9/4/2024    10:33 AM   Additional Questions   Roomed by Amy JOSHI   Accompanied by self     HPI       ED/ Followup:    Facility:  [facility]  Date of visit: 8/30/24  Reason for visit: dizziness, vertigo  Current Status: continues to have some dizziness, has improved, taking meclizine 4 times daily, is helping with symptom  Dizziness  Onset/Duration: 8/30/24  Description:   Do you feel faint: No  Does it feel like the surroundings (bed, room) are moving: No  Unsteady/off balance: YES  Have you passed out or fallen: No  Intensity: mild  Progression of Symptoms: improving with meclizine  Accompanying Signs & Symptoms:  Heart palpitations or chest pain: No  Nausea, vomiting: No  Weakness or lack of coordination in arms or legs: No  Vision or speech changes: No  Numbness or tingling: No  Ringing in ears (Tinnitus): No  Hearing Loss: No  History:   Head trauma/concussion history: No  Previous similar symptoms: YES, 3-4 years ago  Recent bleeding history: No  Any new medications (BP?): No  MR brain at the ER 8/30/2024:  Impression:     1. No acute intracranial pathology.  2. No abnormal intracranial contrast enhancement.   3. Mild generalized cerebral atrophy and mild chronic small vessel  ischemic disease.     Precipitating factors:   Worse with activity: No  Worse with head movement: YES  Alleviating factors:   Does staying in a fixed position give relief: YES  Therapies tried and outcome: meclizine - does help    BP Readings from Last 3 Encounters:   09/04/24 (!) 140/76   08/30/24 (!) 149/81   08/28/24 138/68    Wt Readings from Last 3 Encounters:   09/04/24 77 kg (169 lb 12.8 oz)   08/30/24 76.2 kg (168 lb)   08/28/24 76.3 kg (168 lb 3.2 oz)            Hypertension Follow-up    Do you check your blood pressure regularly outside of the clinic?  "No - only ssometimes - states he usually gets sytolics in 130's  Are you following a low salt diet? No  Are your blood pressures ever more than 140 on the top number (systolic) OR more   than 90 on the bottom number (diastolic), for example 140/90? Patient could not tell for sure.          Review of Systems  Constitutional, HEENT, cardiovascular, pulmonary, GI, , musculoskeletal, neuro, skin, endocrine and psych systems are negative, except as otherwise noted.      Objective    BP (!) 140/76   Pulse 50   Temp 98  F (36.7  C) (Tympanic)   Resp 16   Ht 1.765 m (5' 9.5\")   Wt 77 kg (169 lb 12.8 oz)   SpO2 98%   BMI 24.72 kg/m    Body mass index is 24.72 kg/m .  Physical Exam   GENERAL: well-nourished, alert and no distress, ambulatory w/o assist; gait normal  EYES: EOMI, no nystagmus  HENT: atraumatic, EAMs clear bilaterally; TMs intact with no injection bilaterally; nose clear; throat clear  NECK: no tenderness, no adenopathy,  Thyroid not enlarged  RESP: lungs clear to auscultation - no rales, no rhonchi, no wheezes  CV: regular rates and rhythm, no murmur  MS: no edema  SKIN: no suspicious lesions, no rashes  NEURO: Patient is A and O X 3; Cranial nerves 2-12 intact;  Strength 5/5 all extremities; DTR: ++ x 4; Romberg negative, able to tandem walk; Sensory grossly intact; no tremors No problems with motor coordination     No results found for any visits on 09/04/24.        Signed Electronically by: Tony Herron MD    "

## 2024-09-05 ENCOUNTER — OFFICE VISIT (OUTPATIENT)
Dept: PODIATRY | Facility: CLINIC | Age: 79
End: 2024-09-05
Attending: FAMILY MEDICINE
Payer: COMMERCIAL

## 2024-09-05 VITALS
DIASTOLIC BLOOD PRESSURE: 86 MMHG | BODY MASS INDEX: 24.2 KG/M2 | HEIGHT: 70 IN | WEIGHT: 169 LBS | SYSTOLIC BLOOD PRESSURE: 146 MMHG | HEART RATE: 70 BPM

## 2024-09-05 DIAGNOSIS — M79.674 PAIN OF TOE OF RIGHT FOOT: ICD-10-CM

## 2024-09-05 DIAGNOSIS — S92.424A CLOSED NONDISPLACED FRACTURE OF DISTAL PHALANX OF RIGHT GREAT TOE, INITIAL ENCOUNTER: Primary | ICD-10-CM

## 2024-09-05 PROCEDURE — 99203 OFFICE O/P NEW LOW 30 MIN: CPT | Performed by: PODIATRIST

## 2024-09-05 NOTE — PROGRESS NOTES
PATIENT HISTORY:  Dusty Ruggiero is a 79 year old male who presents to clinic in consultation at the request of  Vazquez Marquez D.O. with a chief complaint of great right toe toe pain.  The patient is seen by themselves.  The patient relates the pain is primarily located around the right big toe.  Patient describes injury as stubbed his toe and his door frame  The patient relates that the symptoms have been going on for several week(s).  The patient has previously tried different shoes with little relief.  The patient is retired.  Any previous notes and studies that pertain to the patient's condition were reviewed.    Pertinent medical, surgical and family history was reviewed in the [facility] chart.    Past Medical History:   Past Medical History:   Diagnosis Date    Generalized osteoarthrosis, unspecified site     Arthritis    Other anxiety states     Prostatitis, unspecified     History of Prostatitis    Ulcerative colitis, unspecified     History of Colitis    Unspecified essential hypertension        Medications:   Current Outpatient Medications:     amLODIPine (NORVASC) 10 MG tablet, Take 1 tablet (10 mg) by mouth daily., Disp: 90 tablet, Rfl: 3    aspirin (ASA) 81 MG EC tablet, Take 1 tablet (81 mg) by mouth daily, Disp:  , Rfl:     Biotin 66082 MCG TABS, , Disp: , Rfl:     Cholecalciferol (VITAMIN D3 PO), Take 5,000 Units by mouth daily, Disp: , Rfl:     Coenzyme Q10 (COQ-10 PO), Take 1 tablet by mouth daily, Disp: , Rfl:     ezetimibe (ZETIA) 10 MG tablet, Take 1 tablet (10 mg) by mouth daily, Disp: 90 tablet, Rfl: 3    ferrous sulfate 325 (65 FE) MG tablet, Take 325 mg by mouth every other day, Disp: , Rfl:     LORazepam (ATIVAN) 1 MG tablet, Take 0.5 tablets (0.5 mg) by mouth 3 times daily as needed for anxiety., Disp: 45 tablet, Rfl: 5    meclizine (ANTIVERT) 12.5 MG tablet, Take 1 tablet (12.5 mg) by mouth 4 times daily as needed for dizziness. Hold until patient calls for refill., Disp: 30 tablet, Rfl:  "0    Naproxen Sodium (ALEVE PO), Take 220 mg by mouth Takes 3 times daily, Disp: , Rfl:     omeprazole (PRILOSEC) 20 MG DR capsule, TAke 1 capsule by mouth daily as needed, Disp: 90 capsule, Rfl: 0    pravastatin (PRAVACHOL) 80 MG tablet, Take 1 tablet (80 mg) by mouth every evening, Disp: 90 tablet, Rfl: 3    psyllium (METAMUCIL) 58.6 % packet, Take 1 packet by mouth daily, Disp: , Rfl:     sertraline (ZOLOFT) 100 MG tablet, Take 50 mg in the am, and 50 mg in PM, Disp: 90 tablet, Rfl: 3    Zinc 30 MG TABS, , Disp: , Rfl:     ondansetron (ZOFRAN ODT) 4 MG ODT tab, Take 1 tablet (4 mg) by mouth every 8 hours as needed for nausea or vomiting (Patient not taking: Reported on 8/28/2024), Disp: 10 tablet, Rfl: 0     Allergies:    Allergies   Allergen Reactions    Atenolol Shortness Of Breath     Other reaction(s): Unknown  Other reaction(s): Hard to breath    Atenolol Difficulty breathing     Rapid heart beat    Atorvastatin Muscle Pain (Myalgia)     Other reaction(s): Myalgias    Buspirone Difficulty breathing and Other (See Comments)     Other reaction(s): Unknown  Rapid heart beat    Coreg [Carvedilol] Dizziness    Gabapentin Other (See Comments)     Other reaction(s): Dizzy  Dizzy      Lyrica [Pregabalin] Other (See Comments)     Other reaction(s): dizziness  Dizzy      Benadryl [Diphenhydramine] Anxiety     Other reaction(s): excitablity    Diazepam Anxiety     Nausea and vomiting    Vistaril [Hydroxyzine] Anxiety     Other reaction(s): dizzy,HA, nausea         Vitals: BP (!) 146/86   Pulse 70   Ht 1.765 m (5' 9.5\")   Wt 76.7 kg (169 lb)   BMI 24.60 kg/m    BMI= Body mass index is 24.6 kg/m .    LOWER EXTREMITY PHYSICAL EXAM    Dermatologic: Skin is intact to right lower extremity without significant lesions, rash or abrasion.        Vascular: DP & PT pulses are intact & regular on the right.   CFT and skin temperature is normal to the right lower extremity.     Neurologic: Lower extremity sensation is intact to " light touch.  No evidence of weakness in the right lower extremity.        Musculoskeletal: Patient is ambulatory without assistive device or brace.  No gross ankle deformity noted.  No foot or ankle joint effusion is noted.  Noted edema around the hallux interphalangeal joint on the right.    Diagnostics:  Radiographs included three views of the right foot demonstrating a nondisplaced fracture of the base of the distal phalanx of the great toe with no cortical erosions or periosteal elevation.  All joint margins appear stable.  There is no apparent fracture or tumor formation noted.  There is no evidence of foreign body.  The images were independently reviewed by myself along with the patient explaining the findings.      ASSESSMENT / PLAN:     ICD-10-CM    1. Closed nondisplaced fracture of distal phalanx of right great toe, initial encounter  S92.424A       2. Pain of toe of right foot  M79.674 Orthopedic  Referral          I have explained to Dusty about the conditions.  We discussed the underlying contributing factors to the condition as well as both conservative and surgical treatment options along with expected length of recovery.  At this time, the patient was educated on the importance of offloading supportive shoes and other devices.    The patient was instructed to perform warm soaks with Epson salt after which to also apply over-the-counter Voltaren gel to deeply massage the injured tissue.  The patient was instructed to do this on a daily basis until symptoms resolve.   The patient may return in four weeks for reevaluation to determine if any further treatment will be needed.      Dusty verbalized agreement with and understanding of the rational for the diagnosis and treatment plan.  All questions were answered to best of my ability and the patient's satisfaction. The patient was advised to contact the clinic with any questions that may arise after the clinic visit.      Disclaimer: This note  consists of symbols derived from keyboarding, dictation and/or voice recognition software. As a result, there may be errors in the script that have gone undetected. Please consider this when interpreting information found in this chart.       CÉSAR Lentz D.P.M., THIERNO.F.A.S.

## 2024-09-05 NOTE — NURSING NOTE
"Chief Complaint   Patient presents with    Consult     Great right toe pain       Initial BP (!) 146/86   Pulse 70   Ht 1.765 m (5' 9.5\")   Wt 76.7 kg (169 lb)   BMI 24.60 kg/m   Estimated body mass index is 24.6 kg/m  as calculated from the following:    Height as of this encounter: 1.765 m (5' 9.5\").    Weight as of this encounter: 76.7 kg (169 lb).  Medications and allergies reviewed.      Venecia DELONG MA    "

## 2024-09-05 NOTE — PATIENT INSTRUCTIONS

## 2024-09-05 NOTE — LETTER
9/5/2024      Dusty Ruggiero  [address]  [place] [address]      Dear Colleague,    Thank you for referring your patient, Dusty Ruggiero, to the [facility] [place]. Please see a copy of my visit note below.    PATIENT HISTORY:  Dusty Ruggiero is a 79 year old male who presents to clinic in consultation at the request of  Vazquez Marquez D.O. with a chief complaint of great right toe toe pain.  The patient is seen by themselves.  The patient relates the pain is primarily located around the right big toe.  Patient describes injury as stubbed his toe and his door frame  The patient relates that the symptoms have been going on for several week(s).  The patient has previously tried different shoes with little relief.  The patient is retired.  Any previous notes and studies that pertain to the patient's condition were reviewed.    Pertinent medical, surgical and family history was reviewed in the [facility] chart.    Past Medical History:   Past Medical History:   Diagnosis Date     Generalized osteoarthrosis, unspecified site     Arthritis     Other anxiety states      Prostatitis, unspecified     History of Prostatitis     Ulcerative colitis, unspecified     History of Colitis     Unspecified essential hypertension        Medications:   Current Outpatient Medications:      amLODIPine (NORVASC) 10 MG tablet, Take 1 tablet (10 mg) by mouth daily., Disp: 90 tablet, Rfl: 3     aspirin (ASA) 81 MG EC tablet, Take 1 tablet (81 mg) by mouth daily, Disp:  , Rfl:      Biotin 44210 MCG TABS, , Disp: , Rfl:      Cholecalciferol (VITAMIN D3 PO), Take 5,000 Units by mouth daily, Disp: , Rfl:      Coenzyme Q10 (COQ-10 PO), Take 1 tablet by mouth daily, Disp: , Rfl:      ezetimibe (ZETIA) 10 MG tablet, Take 1 tablet (10 mg) by mouth daily, Disp: 90 tablet, Rfl: 3     ferrous sulfate 325 (65 FE) MG tablet, Take 325 mg by mouth every other day, Disp: , Rfl:      LORazepam (ATIVAN) 1 MG tablet, Take 0.5  "tablets (0.5 mg) by mouth 3 times daily as needed for anxiety., Disp: 45 tablet, Rfl: 5     meclizine (ANTIVERT) 12.5 MG tablet, Take 1 tablet (12.5 mg) by mouth 4 times daily as needed for dizziness. Hold until patient calls for refill., Disp: 30 tablet, Rfl: 0     Naproxen Sodium (ALEVE PO), Take 220 mg by mouth Takes 3 times daily, Disp: , Rfl:      omeprazole (PRILOSEC) 20 MG DR capsule, TAke 1 capsule by mouth daily as needed, Disp: 90 capsule, Rfl: 0     pravastatin (PRAVACHOL) 80 MG tablet, Take 1 tablet (80 mg) by mouth every evening, Disp: 90 tablet, Rfl: 3     psyllium (METAMUCIL) 58.6 % packet, Take 1 packet by mouth daily, Disp: , Rfl:      sertraline (ZOLOFT) 100 MG tablet, Take 50 mg in the am, and 50 mg in PM, Disp: 90 tablet, Rfl: 3     Zinc 30 MG TABS, , Disp: , Rfl:      ondansetron (ZOFRAN ODT) 4 MG ODT tab, Take 1 tablet (4 mg) by mouth every 8 hours as needed for nausea or vomiting (Patient not taking: Reported on 8/28/2024), Disp: 10 tablet, Rfl: 0     Allergies:    Allergies   Allergen Reactions     Atenolol Shortness Of Breath     Other reaction(s): Unknown  Other reaction(s): Hard to breath     Atenolol Difficulty breathing     Rapid heart beat     Atorvastatin Muscle Pain (Myalgia)     Other reaction(s): Myalgias     Buspirone Difficulty breathing and Other (See Comments)     Other reaction(s): Unknown  Rapid heart beat     Coreg [Carvedilol] Dizziness     Gabapentin Other (See Comments)     Other reaction(s): Dizzy  Dizzy       Lyrica [Pregabalin] Other (See Comments)     Other reaction(s): dizziness  Dizzy       Benadryl [Diphenhydramine] Anxiety     Other reaction(s): excitablity     Diazepam Anxiety     Nausea and vomiting     Vistaril [Hydroxyzine] Anxiety     Other reaction(s): dizzy,HA, nausea         Vitals: BP (!) 146/86   Pulse 70   Ht 1.765 m (5' 9.5\")   Wt 76.7 kg (169 lb)   BMI 24.60 kg/m    BMI= Body mass index is 24.6 kg/m .    LOWER EXTREMITY PHYSICAL " EXAM    Dermatologic: Skin is intact to right lower extremity without significant lesions, rash or abrasion.        Vascular: DP & PT pulses are intact & regular on the right.   CFT and skin temperature is normal to the right lower extremity.     Neurologic: Lower extremity sensation is intact to light touch.  No evidence of weakness in the right lower extremity.        Musculoskeletal: Patient is ambulatory without assistive device or brace.  No gross ankle deformity noted.  No foot or ankle joint effusion is noted.  Noted edema around the hallux interphalangeal joint on the right.    Diagnostics:  Radiographs included three views of the right foot demonstrating a nondisplaced fracture of the base of the distal phalanx of the great toe with no cortical erosions or periosteal elevation.  All joint margins appear stable.  There is no apparent fracture or tumor formation noted.  There is no evidence of foreign body.  The images were independently reviewed by myself along with the patient explaining the findings.      ASSESSMENT / PLAN:     ICD-10-CM    1. Closed nondisplaced fracture of distal phalanx of right great toe, initial encounter  S92.424A       2. Pain of toe of right foot  M79.674 Orthopedic  Referral          I have explained to Dusty about the conditions.  We discussed the underlying contributing factors to the condition as well as both conservative and surgical treatment options along with expected length of recovery.  At this time, the patient was educated on the importance of offloading supportive shoes and other devices.    The patient was instructed to perform warm soaks with Epson salt after which to also apply over-the-counter Voltaren gel to deeply massage the injured tissue.  The patient was instructed to do this on a daily basis until symptoms resolve.   The patient may return in four weeks for reevaluation to determine if any further treatment will be needed.      Dusty verbalized  agreement with and understanding of the rational for the diagnosis and treatment plan.  All questions were answered to best of my ability and the patient's satisfaction. The patient was advised to contact the clinic with any questions that may arise after the clinic visit.      Disclaimer: This note consists of symbols derived from keyboarding, dictation and/or voice recognition software. As a result, there may be errors in the script that have gone undetected. Please consider this when interpreting information found in this chart.       SINDI Correa.P.M., F.A.C.F.A.S.      Again, thank you for allowing me to participate in the care of your patient.        Sincerely,        Adair Lentz DPM

## 2024-11-07 DIAGNOSIS — K21.00 GASTROESOPHAGEAL REFLUX DISEASE WITH ESOPHAGITIS WITHOUT HEMORRHAGE: ICD-10-CM

## 2024-12-11 PROBLEM — G89.29 CHRONIC RIGHT-SIDED LOW BACK PAIN: Status: RESOLVED | Noted: 2023-12-01 | Resolved: 2024-12-11

## 2024-12-11 PROBLEM — M54.50 CHRONIC RIGHT-SIDED LOW BACK PAIN: Status: RESOLVED | Noted: 2023-12-01 | Resolved: 2024-12-11

## 2024-12-23 ENCOUNTER — OFFICE VISIT (OUTPATIENT)
Dept: CARDIOLOGY | Facility: CLINIC | Age: 79
End: 2024-12-23
Payer: COMMERCIAL

## 2024-12-23 VITALS
WEIGHT: 173.5 LBS | HEIGHT: 70 IN | OXYGEN SATURATION: 98 % | BODY MASS INDEX: 24.84 KG/M2 | DIASTOLIC BLOOD PRESSURE: 68 MMHG | HEART RATE: 80 BPM | SYSTOLIC BLOOD PRESSURE: 128 MMHG

## 2024-12-23 DIAGNOSIS — Z01.810 PRE-OPERATIVE CARDIOVASCULAR EXAMINATION: Primary | ICD-10-CM

## 2024-12-23 DIAGNOSIS — I47.10 PAROXYSMAL SUPRAVENTRICULAR TACHYCARDIA (H): ICD-10-CM

## 2024-12-23 DIAGNOSIS — I10 BENIGN ESSENTIAL HYPERTENSION: ICD-10-CM

## 2024-12-23 DIAGNOSIS — E78.5 HYPERLIPIDEMIA LDL GOAL <70: ICD-10-CM

## 2024-12-23 DIAGNOSIS — I49.1 PREMATURE ATRIAL CONTRACTIONS: ICD-10-CM

## 2024-12-23 DIAGNOSIS — I25.10 NONOBSTRUCTIVE ATHEROSCLEROSIS OF CORONARY ARTERY: ICD-10-CM

## 2024-12-23 NOTE — PROGRESS NOTES
Cardiology Clinic Progress Note  Dusty Ruggiero MRN# [id number]   YOB: 1945 Age: 79 year old     Primary cardiologist: Dr. Napier    Reason for visit: Preoperative cardiac risk assessment    Date of surgery: Date not yet known     Surgery type: Right Shoulder arthroplasty with Dr. Doty at [facility]    On beta blocker: None    Do not start within 2 weeks of surgery.    Anticoagulation: None     Low risk surgery:  Ambulatory  Endoscopic  Superficial  Cataract Surgery  Breast Surgery    Intermediate risk surgery:  Carotid endarterectomy  Head and neck surgery  Intraperitoneal (abdominal)  Orthopedic  Prostate    High risk surgery:  Aorta or other major vascular  Peripheral arterial surgery  Complex spinal surgery with prolonged anesthesia  Emergent    History of presenting illness:    Dusty Ruggiero, a pleasant 79 year old patient who presents today for a pre-operative risk assessment prior to a right shoulder arthroplasty.     He feels well overall and denies any new symptoms.  Denies chest pain or chest tightness.  Denies tachycardia or palpitations.  Denies shortness of breath, orthopnea, or PND.  No issues with edema or weight gain.  Denies dizziness, lightheadedness syncope or presyncope.    He remains quite physically active.  Rides a recumbent bike for 30 minutes in the morning and 30 minutes in the evening. This summer he did all his own yard work and gardening.  He felt well when completing all these activities.    He notes he has difficulty awakening from anesthesia.     No history of needing a blood transfusion or a blood clot.     Past medical history :    Nonobstructive coronary artery disease, underwent coronary angiogram in January 2021 showed 70% OM2 lesion, treated medically and minimal disease noted elsewhere, echocardiogram from 2020 showing normal left ventricular ejection fraction 55 to 60% with no regional wall motion abnormalities no significant valvular  "abnormalities  Frequent PACs and paroxysmal SVT, Holter monitor done June 2021 with a 14% PAC burden with runs up to 11 beats, did not tolerate beta-blockers, remains asymptomatic  Hypertension  Hyperlipidemia  BPPV         Assessment and Plan:     ASSESSMENT:    Preoperative cardiovascular risk assessment  Patient is undergoing a intermediate risk surgery with a low cardiac risk (0.9%)  EKG completed today and stable showing sinus rhythm with frequent PACs and 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  Frequent asymptomatic PACs  Intolerant of beta-blockers    PLAN:     Okay to hold aspirin for procedure temporarily, resume when okay with orthopedics  Continue pravastatin and Zetia  Continue amlodipine 10 mg daily  Patient plans to change to [facility] due to insurance issues, recommended he follow[facility] in [facility] is at low cardiac risk (0.9%) for a intermediate risk surgery.               Review of Systems:     Review of Systems:  Skin:        Eyes:       ENT:       Respiratory:  Negative    Cardiovascular:  Negative    Gastroenterology:      Genitourinary:       Musculoskeletal:       Neurologic:       Psychiatric:       Heme/Lymph/Imm:       Endocrine:  Negative              Physical Exam:     Vitals: /68   Pulse 80   Ht 1.765 m (5' 9.5\")   Wt 78.7 kg (173 lb 8 oz)   SpO2 98%   BMI 25.25 kg/m    Constitutional:   Appears well-nourished, no acute distress    Skin:      No apparent lesion     Head:      Normocephalic     Eyes:    Sclera Nonicteric      Neck:      No " JVD with patient supine     Cardiac:     Normal S1-S2, no murmur, rub, or gallop regular rate and rhythm               Abdomen:     Abdomen soft, nontender      Extremities and Back:     No edema      Neurological:    Alert and oriented x 3, no focal deficits              Medications:     Current Outpatient Medications   Medication Sig Dispense Refill    amLODIPine (NORVASC) 10 MG tablet Take 1 tablet (10 mg) by mouth daily. 90 tablet 3    aspirin (ASA) 81 MG EC tablet Take 1 tablet (81 mg) by mouth daily      Biotin 22009 MCG TABS       Cholecalciferol (VITAMIN D3 PO) Take 5,000 Units by mouth daily      Coenzyme Q10 (COQ-10 PO) Take 1 tablet by mouth daily      ezetimibe (ZETIA) 10 MG tablet Take 1 tablet (10 mg) by mouth daily 90 tablet 3    ferrous sulfate 325 (65 FE) MG tablet Take 325 mg by mouth every other day      LORazepam (ATIVAN) 1 MG tablet Take 0.5 tablets (0.5 mg) by mouth 3 times daily as needed for anxiety. 45 tablet 5    meclizine (ANTIVERT) 12.5 MG tablet Take 1 tablet (12.5 mg) by mouth 4 times daily as needed for dizziness. Hold until patient calls for refill. 30 tablet 0    Naproxen Sodium (ALEVE PO) Take 220 mg by mouth Takes 3 times daily      omeprazole (PRILOSEC) 20 MG DR capsule TAke 1 capsule by mouth daily as needed 90 capsule 1    pravastatin (PRAVACHOL) 80 MG tablet Take 1 tablet (80 mg) by mouth every evening 90 tablet 3    psyllium (METAMUCIL) 58.6 % packet Take 1 packet by mouth daily      sertraline (ZOLOFT) 100 MG tablet Take 50 mg in the am, and 50 mg in PM 90 tablet 3    Zinc 30 MG TABS       ondansetron (ZOFRAN ODT) 4 MG ODT tab Take 1 tablet (4 mg) by mouth every 8 hours as needed for nausea or vomiting (Patient not taking: Reported on 12/23/2024) 10 tablet 0       Family History   Problem Relation Age of Onset    Skin Cancer Father     Breast Cancer Sister     Melanoma No family hx of        Social History     Socioeconomic History    Marital status:      Spouse name:  Not on file    Number of children: Not on file    Years of education: Not on file    Highest education level: Not on file   Occupational History    Not on file   Tobacco Use    Smoking status: Former     Current packs/day: 0.00     Average packs/day: 0.3 packs/day for 20.0 years (5.0 ttl pk-yrs)     Types: Cigarettes     Start date: 1975     Quit date: 1995     Years since quittin.9    Smokeless tobacco: Never   Vaping Use    Vaping status: Never Used   Substance and Sexual Activity    Alcohol use: No     Comment: stopped a couple of months    Drug use: No    Sexual activity: Not Currently   Other Topics Concern    Parent/sibling w/ CABG, MI or angioplasty before 65F 55M? No   Social History Narrative    Not on file     Social Drivers of Health     Financial Resource Strain: Not on file   Food Insecurity: Not on file   Transportation Needs: Not on file   Physical Activity: Not on file   Stress: Not on file   Social Connections: Not on file   Interpersonal Safety: Low Risk  (2024)    Interpersonal Safety     Do you feel physically and emotionally safe where you currently live?: Yes     Within the past 12 months, have you been hit, slapped, kicked or otherwise physically hurt by someone?: No     Within the past 12 months, have you been humiliated or emotionally abused in other ways by your partner or ex-partner?: No   Housing Stability: Not on file            Past Medical History:     Past Medical History:   Diagnosis Date    Generalized osteoarthrosis, unspecified site     Arthritis    Other anxiety states     Prostatitis, unspecified     History of Prostatitis    Ulcerative colitis, unspecified     History of Colitis    Unspecified essential hypertension               Past Surgical History:     Past Surgical History:   Procedure Laterality Date    ARTHROSCOPY SHOULDER, OPEN ROTATOR CUFF REPAIR, COMBINED Right 3/4/2016    Procedure: COMBINED ARTHROSCOPY SHOULDER, OPEN ROTATOR CUFF REPAIR;  Surgeon:  Wily Kan MD;  Location: [facility] OR    CV LEFT HEART CATH N/A 1/11/2021    Procedure: Left Heart Cath;  Surgeon: Mike Camara MD;  Location:  HEART CARDIAC CATH LAB    FLEXIBLE SIGMOIDOSCOPY  9/1999    PHACOEMULSIFICATION CLEAR CORNEA WITH TORIC INTRAOCULAR LENS IMPLANT Right 2/19/2018    Procedure: PHACOEMULSIFICATION CLEAR CORNEA WITH TORIC INTRAOCULAR LENS IMPLANT;  Right Cataract Removal with Implant--Toric Lens;  Surgeon: Mike Collier MD;  Location: [facility] OR    PHACOEMULSIFICATION CLEAR CORNEA WITH TORIC INTRAOCULAR LENS IMPLANT Left 3/12/2018    Procedure: PHACOEMULSIFICATION CLEAR CORNEA WITH TORIC INTRAOCULAR LENS IMPLANT;  Left Cataract Removal with Implant--Toric lens;  Surgeon: Mike Collier MD;  Location: [facility] OR    SURGICAL HISTORY OF -       Colostomy  with ileostomy    SURGICAL HISTORY OF -   2000    Colostomy Reversal    SURGICAL HISTORY OF -       Left Shoulder Rotator Cuff Surgery              Allergies:   Atenolol, Atenolol, Atorvastatin, Buspirone, Coreg [carvedilol], Gabapentin, Lyrica [pregabalin], Benadryl [diphenhydramine], Diazepam, and Vistaril [hydroxyzine]       Data:   All laboratory data reviewed:    Recent Labs   Lab Test 02/27/24  0847 05/30/23  0906 03/27/23  1004   LDL 70 68 109*   HDL 69 73 67   NHDL 89 87 137*   CHOL 158 160 204*   TRIG 93 97 138       Lab Results   Component Value Date    WBC 6.3 08/30/2024    WBC 10.1 01/11/2021    RBC 3.87 (L) 08/30/2024    RBC 4.68 01/11/2021    HGB 12.4 (L) 08/30/2024    HGB 13.5 01/11/2021    HCT 37.4 (L) 08/30/2024    HCT 41.8 01/11/2021    MCV 97 08/30/2024    MCV 89 01/11/2021    MCH 32.0 08/30/2024    MCH 28.8 01/11/2021    MCHC 33.2 08/30/2024    MCHC 32.3 01/11/2021    RDW 12.7 08/30/2024    RDW 13.2 01/11/2021     08/30/2024     01/11/2021       Lab Results   Component Value Date     08/30/2024     01/11/2021    POTASSIUM 4.3 08/30/2024    POTASSIUM 3.7 02/26/2022     "POTASSIUM 3.6 01/11/2021    CHLORIDE 103 08/30/2024    CHLORIDE 105 02/26/2022    CHLORIDE 103 01/11/2021    CO2 22 08/30/2024    CO2 25 02/26/2022    CO2 27 01/11/2021    ANIONGAP 12 08/30/2024    ANIONGAP 7 02/26/2022    ANIONGAP 6 01/11/2021     (H) 08/30/2024     (H) 02/26/2022     (H) 01/11/2021    BUN 16.4 08/30/2024    BUN 16 02/26/2022    BUN 14 01/11/2021    CR 0.86 08/30/2024    CR 0.85 01/11/2021    GFRESTIMATED 88 08/30/2024    GFRESTIMATED 82 02/18/2021    GFRESTBLACK >90 02/18/2021    ROCIO 9.1 08/30/2024    ROCIO 9.5 01/11/2021      Lab Results   Component Value Date    AST 20 02/26/2022    AST 21 03/21/2005    ALT 15 02/27/2024    ALT 25 06/30/2021       No results found for: \"A1C\"    Lab Results   Component Value Date    INR 1.11 01/11/2021         Kalani Villafana CNP  [facility]  RN phone: [phone]   "

## 2024-12-23 NOTE — LETTER
12/23/2024    Vazquez Marquez, DO  [address] [facility] [address]    RE: Dusty Ruggiero       Dear Colleague,     I had the pleasure of seeing Dusty Ruggiero in the [facility].      Cardiology Clinic Progress Note  Dusty Ruggiero MRN# [id number]   YOB: 1945 Age: 79 year old     Primary cardiologist: Dr. Napier    Reason for visit: Preoperative cardiac risk assessment    Date of surgery: Date not yet known     Surgery type: Right Shoulder arthroplasty with Dr. Doty at [facility]    On beta blocker: None    Do not start within 2 weeks of surgery.    Anticoagulation: None     Low risk surgery:  Ambulatory  Endoscopic  Superficial  Cataract Surgery  Breast Surgery    Intermediate risk surgery:  Carotid endarterectomy  Head and neck surgery  Intraperitoneal (abdominal)  Orthopedic  Prostate    High risk surgery:  Aorta or other major vascular  Peripheral arterial surgery  Complex spinal surgery with prolonged anesthesia  Emergent    History of presenting illness:    Dusty Ruggiero, a pleasant 79 year old patient who presents today for a pre-operative risk assessment prior to a right shoulder arthroplasty.     He feels well overall and denies any new symptoms.  Denies chest pain or chest tightness.  Denies tachycardia or palpitations.  Denies shortness of breath, orthopnea, or PND.  No issues with edema or weight gain.  Denies dizziness, lightheadedness syncope or presyncope.    He remains quite physically active.  Rides a recumbent bike for 30 minutes in the morning and 30 minutes in the evening. This summer he did all his own yard work and gardening.  He felt well when completing all these activities.    He notes he has difficulty awakening from anesthesia.     No history of needing a blood transfusion or a blood clot.     Past medical history :    Nonobstructive coronary artery disease, underwent coronary angiogram in January 2021 showed 70% OM2 lesion, treated medically and  "minimal disease noted elsewhere, echocardiogram from 2020 showing normal left ventricular ejection fraction 55 to 60% with no regional wall motion abnormalities no significant valvular abnormalities  Frequent PACs and paroxysmal SVT, Holter monitor done June 2021 with a 14% PAC burden with runs up to 11 beats, did not tolerate beta-blockers, remains asymptomatic  Hypertension  Hyperlipidemia  BPPV         Assessment and Plan:     ASSESSMENT:    Preoperative cardiovascular risk assessment  Patient is undergoing a intermediate risk surgery with a low cardiac risk (0.9%)  EKG completed today and stable showing sinus rhythm with frequent PACs and 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  Frequent asymptomatic PACs  Intolerant of beta-blockers    PLAN:     Okay to hold aspirin for procedure temporarily, resume when okay with orthopedics  Continue pravastatin and Zetia  Continue amlodipine 10 mg daily  [place], recommended he follow-up in 6 months [facility] cardiac risk (0.9%) for a intermediate risk surgery.               Review of Systems:     Review of Systems:  Skin:        Eyes:       ENT:       Respiratory:  Negative    Cardiovascular:  Negative    Gastroenterology:      Genitourinary:       Musculoskeletal:       Neurologic:       Psychiatric:       Heme/Lymph/Imm:       Endocrine:  Negative              Physical Exam:     Vitals: /68   Pulse 80   Ht 1.765 m (5' 9.5\")   Wt 78.7 kg (173 lb 8 oz)   SpO2 98%   BMI " 25.25 kg/m    Constitutional:   Appears well-nourished, no acute distress    Skin:      No apparent lesion     Head:      Normocephalic     Eyes:    Sclera Nonicteric      Neck:      No JVD with patient supine     Cardiac:     Normal S1-S2, no murmur, rub, or gallop regular rate and rhythm               Abdomen:     Abdomen soft, nontender      Extremities and Back:     No edema      Neurological:    Alert and oriented x 3, no focal deficits              Medications:     Current Outpatient Medications   Medication Sig Dispense Refill     amLODIPine (NORVASC) 10 MG tablet Take 1 tablet (10 mg) by mouth daily. 90 tablet 3     aspirin (ASA) 81 MG EC tablet Take 1 tablet (81 mg) by mouth daily       Biotin 87012 MCG TABS        Cholecalciferol (VITAMIN D3 PO) Take 5,000 Units by mouth daily       Coenzyme Q10 (COQ-10 PO) Take 1 tablet by mouth daily       ezetimibe (ZETIA) 10 MG tablet Take 1 tablet (10 mg) by mouth daily 90 tablet 3     ferrous sulfate 325 (65 FE) MG tablet Take 325 mg by mouth every other day       LORazepam (ATIVAN) 1 MG tablet Take 0.5 tablets (0.5 mg) by mouth 3 times daily as needed for anxiety. 45 tablet 5     meclizine (ANTIVERT) 12.5 MG tablet Take 1 tablet (12.5 mg) by mouth 4 times daily as needed for dizziness. Hold until patient calls for refill. 30 tablet 0     Naproxen Sodium (ALEVE PO) Take 220 mg by mouth Takes 3 times daily       omeprazole (PRILOSEC) 20 MG DR capsule TAke 1 capsule by mouth daily as needed 90 capsule 1     pravastatin (PRAVACHOL) 80 MG tablet Take 1 tablet (80 mg) by mouth every evening 90 tablet 3     psyllium (METAMUCIL) 58.6 % packet Take 1 packet by mouth daily       sertraline (ZOLOFT) 100 MG tablet Take 50 mg in the am, and 50 mg in PM 90 tablet 3     Zinc 30 MG TABS        ondansetron (ZOFRAN ODT) 4 MG ODT tab Take 1 tablet (4 mg) by mouth every 8 hours as needed for nausea or vomiting (Patient not taking: Reported on 12/23/2024) 10 tablet 0       Family  History   Problem Relation Age of Onset     Skin Cancer Father      Breast Cancer Sister      Melanoma No family hx of        Social History     Socioeconomic History     Marital status:      Spouse name: Not on file     Number of children: Not on file     Years of education: Not on file     Highest education level: Not on file   Occupational History     Not on file   Tobacco Use     Smoking status: Former     Current packs/day: 0.00     Average packs/day: 0.3 packs/day for 20.0 years (5.0 ttl pk-yrs)     Types: Cigarettes     Start date: 1975     Quit date: 1995     Years since quittin.9     Smokeless tobacco: Never   Vaping Use     Vaping status: Never Used   Substance and Sexual Activity     Alcohol use: No     Comment: stopped a couple of months     Drug use: No     Sexual activity: Not Currently   Other Topics Concern     Parent/sibling w/ CABG, MI or angioplasty before 65F 55M? No   Social History Narrative     Not on file     Social Drivers of Health     Financial Resource Strain: Not on file   Food Insecurity: Not on file   Transportation Needs: Not on file   Physical Activity: Not on file   Stress: Not on file   Social Connections: Not on file   Interpersonal Safety: Low Risk  (2024)    Interpersonal Safety      Do you feel physically and emotionally safe where you currently live?: Yes      Within the past 12 months, have you been hit, slapped, kicked or otherwise physically hurt by someone?: No      Within the past 12 months, have you been humiliated or emotionally abused in other ways by your partner or ex-partner?: No   Housing Stability: Not on file            Past Medical History:     Past Medical History:   Diagnosis Date     Generalized osteoarthrosis, unspecified site     Arthritis     Other anxiety states      Prostatitis, unspecified     History of Prostatitis     Ulcerative colitis, unspecified     History of Colitis     Unspecified essential hypertension                Past Surgical History:     Past Surgical History:   Procedure Laterality Date     ARTHROSCOPY SHOULDER, OPEN ROTATOR CUFF REPAIR, COMBINED Right 3/4/2016    Procedure: COMBINED ARTHROSCOPY SHOULDER, OPEN ROTATOR CUFF REPAIR;  Surgeon: Wily Kan MD;  Location: [facility] OR     CV LEFT HEART CATH N/A 1/11/2021    Procedure: Left Heart Cath;  Surgeon: Mike Camara MD;  Location:  HEART CARDIAC CATH LAB     FLEXIBLE SIGMOIDOSCOPY  9/1999     PHACOEMULSIFICATION CLEAR CORNEA WITH TORIC INTRAOCULAR LENS IMPLANT Right 2/19/2018    Procedure: PHACOEMULSIFICATION CLEAR CORNEA WITH TORIC INTRAOCULAR LENS IMPLANT;  Right Cataract Removal with Implant--Toric Lens;  Surgeon: Mike Collier MD;  Location: [facility] OR     PHACOEMULSIFICATION CLEAR CORNEA WITH TORIC INTRAOCULAR LENS IMPLANT Left 3/12/2018    Procedure: PHACOEMULSIFICATION CLEAR CORNEA WITH TORIC INTRAOCULAR LENS IMPLANT;  Left Cataract Removal with Implant--Toric lens;  Surgeon: Mike Collier MD;  Location: [facility] OR     SURGICAL HISTORY OF -       Colostomy  with ileostomy     SURGICAL HISTORY OF -   2000    Colostomy Reversal     SURGICAL HISTORY OF -       Left Shoulder Rotator Cuff Surgery              Allergies:   Atenolol, Atenolol, Atorvastatin, Buspirone, Coreg [carvedilol], Gabapentin, Lyrica [pregabalin], Benadryl [diphenhydramine], Diazepam, and Vistaril [hydroxyzine]       Data:   All laboratory data reviewed:    Recent Labs   Lab Test 02/27/24  0847 05/30/23  0906 03/27/23  1004   LDL 70 68 109*   HDL 69 73 67   NHDL 89 87 137*   CHOL 158 160 204*   TRIG 93 97 138       Lab Results   Component Value Date    WBC 6.3 08/30/2024    WBC 10.1 01/11/2021    RBC 3.87 (L) 08/30/2024    RBC 4.68 01/11/2021    HGB 12.4 (L) 08/30/2024    HGB 13.5 01/11/2021    HCT 37.4 (L) 08/30/2024    HCT 41.8 01/11/2021    MCV 97 08/30/2024    MCV 89 01/11/2021    MCH 32.0 08/30/2024    MCH 28.8 01/11/2021    MCHC 33.2 08/30/2024    [facility] 32.3  "01/11/2021    RDW 12.7 08/30/2024    RDW 13.2 01/11/2021     08/30/2024     01/11/2021       Lab Results   Component Value Date     08/30/2024     01/11/2021    POTASSIUM 4.3 08/30/2024    POTASSIUM 3.7 02/26/2022    POTASSIUM 3.6 01/11/2021    CHLORIDE 103 08/30/2024    CHLORIDE 105 02/26/2022    CHLORIDE 103 01/11/2021    CO2 22 08/30/2024    CO2 25 02/26/2022    CO2 27 01/11/2021    ANIONGAP 12 08/30/2024    ANIONGAP 7 02/26/2022    ANIONGAP 6 01/11/2021     (H) 08/30/2024     (H) 02/26/2022     (H) 01/11/2021    BUN 16.4 08/30/2024    BUN 16 02/26/2022    BUN 14 01/11/2021    CR 0.86 08/30/2024    CR 0.85 01/11/2021    GFRESTIMATED 88 08/30/2024    GFRESTIMATED 82 02/18/2021    GFRESTBLACK >90 02/18/2021    ROCIO 9.1 08/30/2024    ROCIO 9.5 01/11/2021      Lab Results   Component Value Date    AST 20 02/26/2022    AST 21 03/21/2005    ALT 15 02/27/2024    ALT 25 06/30/2021       No results found for: \"A1C\"    Lab Results   Component Value Date    INR 1.11 01/11/2021         Kalani Villafana CNP  [facility]  RN phone: [phone]       Thank you for allowing me to participate in the care of your patient.      Sincerely,     Kalani Villafana NP     [facility]  cc:   Timi Napier MD  No address on file      "

## 2024-12-24 ENCOUNTER — TELEPHONE (OUTPATIENT)
Dept: CARDIOLOGY | Facility: CLINIC | Age: 79
End: 2024-12-24
Payer: COMMERCIAL

## 2024-12-24 NOTE — TELEPHONE ENCOUNTER
12/23 office note from Carla Villafana, SANTI right faxed to EDDIE, , fax [phone]. Chidi SOSA December 24, 2024, 10:18 AM

## 2024-12-24 NOTE — TELEPHONE ENCOUNTER
----- Message from Kalani Villafana sent at 12/23/2024  5:03 PM CST -----  Can we fax my visit notes from today to Dr. Doty with [facility] orthopedics for a pre-op assessment? [phone]. Thanks.

## 2025-03-25 DIAGNOSIS — E78.5 HYPERLIPIDEMIA LDL GOAL <70: ICD-10-CM

## 2025-03-25 DIAGNOSIS — I25.10 NONOBSTRUCTIVE ATHEROSCLEROSIS OF CORONARY ARTERY: ICD-10-CM

## 2025-03-25 DIAGNOSIS — I25.10 CORONARY ARTERY DISEASE INVOLVING NATIVE CORONARY ARTERY OF NATIVE HEART WITHOUT ANGINA PECTORIS: ICD-10-CM

## 2025-03-25 RX ORDER — EZETIMIBE 10 MG/1
10 TABLET ORAL DAILY
Qty: 90 TABLET | Refills: 0 | Status: SHIPPED | OUTPATIENT
Start: 2025-03-25

## 2025-03-25 NOTE — TELEPHONE ENCOUNTER
[facility] Cardiology Refill Guideline reviewed.  Medication meets criteria for refill.    90 day supply granted. Pt has transferred to [facility] cardiology. Will need to get further refills from them.     Aleksandra Ch RN

## 2025-03-25 NOTE — TELEPHONE ENCOUNTER
Last Office Visit: 12/23/24 (Broderick)  Next Office Visit: TBD  Last Fill Date: 12/26/24  Gina Chavez LPN Cardiology   3/25/2025 10:18 AM

## 2025-04-14 DIAGNOSIS — E78.2 MIXED HYPERLIPIDEMIA: ICD-10-CM

## 2025-04-14 RX ORDER — PRAVASTATIN SODIUM 80 MG/1
80 TABLET ORAL EVERY EVENING
Qty: 90 TABLET | Refills: 0 | Status: SHIPPED | OUTPATIENT
Start: 2025-04-14

## 2025-04-14 NOTE — TELEPHONE ENCOUNTER
[facility] Cardiology Refill Guideline reviewed.  Medication meets criteria for refill.    Winter Zamora RN

## 2025-04-14 NOTE — TELEPHONE ENCOUNTER
Last Office Visit: 12/23/24 (Broderick)  Next Office Visit: TBD  Last Fill Date: 2/13/25  Gina Chavez LPN Cardiology   4/14/2025 7:37 AM

## 2025-06-24 DIAGNOSIS — I25.10 NONOBSTRUCTIVE ATHEROSCLEROSIS OF CORONARY ARTERY: ICD-10-CM

## 2025-06-24 DIAGNOSIS — E78.5 HYPERLIPIDEMIA LDL GOAL <70: ICD-10-CM

## 2025-06-24 DIAGNOSIS — I25.10 CORONARY ARTERY DISEASE INVOLVING NATIVE CORONARY ARTERY OF NATIVE HEART WITHOUT ANGINA PECTORIS: ICD-10-CM

## 2025-06-24 RX ORDER — EZETIMIBE 10 MG/1
10 TABLET ORAL DAILY
Qty: 90 TABLET | Refills: 0 | OUTPATIENT
Start: 2025-06-24

## 2025-06-24 NOTE — TELEPHONE ENCOUNTER
Last Office Visit: 12/23/24 (Broderick)  Next Office Visit: TBD  Last Fill Date: 3/25/25  Gina Chavez LPN Cardiology   6/24/2025 11:23 AM

## (undated) DEVICE — PREP PAD ALCOHOL 6818

## (undated) DEVICE — SLEEVE TR BAND RADIAL COMPRESSION DEVICE 24CM TRB24-REG

## (undated) DEVICE — MEDITRACE MULTIFUNTION ADULT RADIOTRANSPARENT ELECTRODE FOR ZOLL

## (undated) DEVICE — KIT HAND CONTROL ANGIOTOUCH ACIST 65CM AT-P65

## (undated) DEVICE — Device

## (undated) DEVICE — SOL NACL 0.9% IRRIG 1000ML BOTTLE 07138-09

## (undated) DEVICE — MANIFOLD KIT ANGIO AUTOMATED 014613

## (undated) DEVICE — SOL WATER IRRIG 1000ML BOTTLE 07139-09

## (undated) DEVICE — CATH JACKY 5FR 3.5 CURVE 40-5023

## (undated) DEVICE — INTRO GLIDESHEATH SLENDER 6FR 10X45CM 60-1060

## (undated) DEVICE — TOTE ANGIO CORP PC15AT SAN32CC83O

## (undated) RX ORDER — CYCLOPENTOLATE HYDROCHLORIDE 10 MG/ML
SOLUTION/ DROPS OPHTHALMIC
Status: DISPENSED
Start: 2018-03-12

## (undated) RX ORDER — NITROGLYCERIN 5 MG/ML
VIAL (ML) INTRAVENOUS
Status: DISPENSED
Start: 2021-01-11

## (undated) RX ORDER — TROPICAMIDE 10 MG/ML
SOLUTION/ DROPS OPHTHALMIC
Status: DISPENSED
Start: 2018-02-19

## (undated) RX ORDER — LIDOCAINE HYDROCHLORIDE 10 MG/ML
INJECTION, SOLUTION EPIDURAL; INFILTRATION; INTRACAUDAL; PERINEURAL
Status: DISPENSED
Start: 2021-01-11

## (undated) RX ORDER — VERAPAMIL HYDROCHLORIDE 2.5 MG/ML
INJECTION, SOLUTION INTRAVENOUS
Status: DISPENSED
Start: 2021-01-11

## (undated) RX ORDER — FENTANYL CITRATE 50 UG/ML
INJECTION, SOLUTION INTRAMUSCULAR; INTRAVENOUS
Status: DISPENSED
Start: 2021-01-11

## (undated) RX ORDER — PHENYLEPHRINE HYDROCHLORIDE 25 MG/ML
SOLUTION/ DROPS OPHTHALMIC
Status: DISPENSED
Start: 2018-02-19

## (undated) RX ORDER — HEPARIN SODIUM 1000 [USP'U]/ML
INJECTION, SOLUTION INTRAVENOUS; SUBCUTANEOUS
Status: DISPENSED
Start: 2021-01-11

## (undated) RX ORDER — REGADENOSON 0.08 MG/ML
INJECTION, SOLUTION INTRAVENOUS
Status: DISPENSED
Start: 2020-12-23

## (undated) RX ORDER — POTASSIUM CHLORIDE 1500 MG/1
TABLET, EXTENDED RELEASE ORAL
Status: DISPENSED
Start: 2021-01-11

## (undated) RX ORDER — HEPARIN SODIUM 200 [USP'U]/100ML
INJECTION, SOLUTION INTRAVENOUS
Status: DISPENSED
Start: 2021-01-11

## (undated) RX ORDER — TROPICAMIDE 10 MG/ML
SOLUTION/ DROPS OPHTHALMIC
Status: DISPENSED
Start: 2018-03-12

## (undated) RX ORDER — CYCLOPENTOLATE HYDROCHLORIDE 10 MG/ML
SOLUTION/ DROPS OPHTHALMIC
Status: DISPENSED
Start: 2018-02-19

## (undated) RX ORDER — PHENYLEPHRINE HYDROCHLORIDE 25 MG/ML
SOLUTION/ DROPS OPHTHALMIC
Status: DISPENSED
Start: 2018-03-12